# Patient Record
Sex: FEMALE | Race: WHITE | NOT HISPANIC OR LATINO | Employment: OTHER | URBAN - METROPOLITAN AREA
[De-identification: names, ages, dates, MRNs, and addresses within clinical notes are randomized per-mention and may not be internally consistent; named-entity substitution may affect disease eponyms.]

---

## 2017-04-28 ENCOUNTER — OFFICE VISIT (OUTPATIENT)
Dept: FAMILY MEDICINE | Facility: CLINIC | Age: 72
End: 2017-04-28
Payer: MEDICARE

## 2017-04-28 VITALS
HEART RATE: 56 BPM | TEMPERATURE: 97.6 F | SYSTOLIC BLOOD PRESSURE: 110 MMHG | BODY MASS INDEX: 31.03 KG/M2 | HEIGHT: 66 IN | DIASTOLIC BLOOD PRESSURE: 62 MMHG | WEIGHT: 193.1 LBS

## 2017-04-28 DIAGNOSIS — N18.30 CKD (CHRONIC KIDNEY DISEASE) STAGE 3, GFR 30-59 ML/MIN (H): ICD-10-CM

## 2017-04-28 DIAGNOSIS — Z00.00 WELL ADULT HEALTH CHECK: Primary | ICD-10-CM

## 2017-04-28 DIAGNOSIS — Z11.59 NEED FOR HEPATITIS C SCREENING TEST: ICD-10-CM

## 2017-04-28 DIAGNOSIS — E03.8 OTHER SPECIFIED HYPOTHYROIDISM: ICD-10-CM

## 2017-04-28 DIAGNOSIS — Z23 NEED FOR PROPHYLACTIC VACCINATION AGAINST STREPTOCOCCUS PNEUMONIAE (PNEUMOCOCCUS): ICD-10-CM

## 2017-04-28 DIAGNOSIS — Z13.6 CARDIOVASCULAR SCREENING; LDL GOAL LESS THAN 100: ICD-10-CM

## 2017-04-28 DIAGNOSIS — Z78.0 ASYMPTOMATIC POSTMENOPAUSAL STATUS: ICD-10-CM

## 2017-04-28 DIAGNOSIS — N39.41 URGE INCONTINENCE OF URINE: ICD-10-CM

## 2017-04-28 DIAGNOSIS — Z71.89 ADVANCED DIRECTIVES, COUNSELING/DISCUSSION: ICD-10-CM

## 2017-04-28 LAB — HGB BLD-MCNC: 13.1 G/DL (ref 11.7–15.7)

## 2017-04-28 PROCEDURE — 80053 COMPREHEN METABOLIC PANEL: CPT | Performed by: PHYSICIAN ASSISTANT

## 2017-04-28 PROCEDURE — 82043 UR ALBUMIN QUANTITATIVE: CPT | Performed by: PHYSICIAN ASSISTANT

## 2017-04-28 PROCEDURE — G0472 HEP C SCREEN HIGH RISK/OTHER: HCPCS | Performed by: PHYSICIAN ASSISTANT

## 2017-04-28 PROCEDURE — 80061 LIPID PANEL: CPT | Performed by: PHYSICIAN ASSISTANT

## 2017-04-28 PROCEDURE — G0439 PPPS, SUBSEQ VISIT: HCPCS | Performed by: PHYSICIAN ASSISTANT

## 2017-04-28 PROCEDURE — 36415 COLL VENOUS BLD VENIPUNCTURE: CPT | Performed by: PHYSICIAN ASSISTANT

## 2017-04-28 PROCEDURE — 84443 ASSAY THYROID STIM HORMONE: CPT | Performed by: PHYSICIAN ASSISTANT

## 2017-04-28 PROCEDURE — 86803 HEPATITIS C AB TEST: CPT | Performed by: PHYSICIAN ASSISTANT

## 2017-04-28 PROCEDURE — 85018 HEMOGLOBIN: CPT | Performed by: PHYSICIAN ASSISTANT

## 2017-04-28 RX ORDER — LISINOPRIL 2.5 MG/1
2.5 TABLET ORAL DAILY
Qty: 90 TABLET | Refills: 3 | Status: SHIPPED | OUTPATIENT
Start: 2017-04-28 | End: 2018-04-30

## 2017-04-28 RX ORDER — LEVOTHYROXINE SODIUM 50 UG/1
50 TABLET ORAL DAILY
Qty: 90 TABLET | Refills: 3 | Status: SHIPPED | OUTPATIENT
Start: 2017-04-28 | End: 2018-04-30

## 2017-04-28 NOTE — PROGRESS NOTES
SUBJECTIVE:                                                            Tonja Nieves is a 72 year old female who presents for Preventive Visit.      Are you in the first 12 months of your Medicare Part B coverage?  No    Healthy Habits:    Do you get at least three servings of calcium containing foods daily (dairy, green leafy vegetables, etc.)? yes    Amount of exercise or daily activities, outside of work: 1 day(s) per week    Problems taking medications regularly No    Medication side effects: dry mouth    Have you had an eye exam in the past two years? yes    Do you see a dentist twice per year? yes    Do you have sleep apnea, excessive snoring or daytime drowsiness?no    COGNITIVE SCREEN  1) Repeat 3 items (Banana, Sunrise, Chair)    2) Clock draw: NORMAL  3) 3 item recall: Recalls 3 objects  Results: 3 items recalled: COGNITIVE IMPAIRMENT LESS LIKELY    Mini-CogTM Copyright S Joie. Licensed by the author for use in [facility]; reprinted with permission (kera@[facility]). All rights reserved.                Reviewed and updated as needed this visit by clinical staff  Tobacco  Allergies  Meds  Med Hx  Surg Hx  Fam Hx  Soc Hx        Reviewed and updated as needed this visit by Provider        Social History   Substance Use Topics     Smoking status: Never Smoker     Smokeless tobacco: Never Used     Alcohol use No       does not drink    Today's PHQ-2 Score:   PHQ-2 ( 1999 Pfizer) 4/13/2015 4/10/2014   Q1: Little interest or pleasure in doing things 0 0   Q2: Feeling down, depressed or hopeless 0 0   PHQ-2 Score 0 0       Do you feel safe in your environment - Yes    Do you have a Health Care Directive?: Yes: Advance Directive has been received and scanned.    Current providers sharing in care for this patient include:   Patient Care Team:  Aaseby-Aguilera, Ramona Ann, PA-C as PCP - General (Family Practice)      Hearing impairment: No    Ability to successfully perform activities of daily  "living: Yes, no assistance needed     Fall risk:  Fallen 2 or more times in the past year?: No  Any fall with injury in the past year?: No    Home safety:  none identified  click delete button to remove this line now    The following health maintenance items are reviewed in Epic and correct as of today:  Health Maintenance   Topic Date Due     HEPATITIS C SCREENING  03/07/1963     DEXA SCAN SCREENING (SYSTEM ASSIGNED)  03/07/2012     PNEUMOCOCCAL (2 of 2 - PPSV23) 04/13/2016     BMP Q1 YR (NO INBASKET)  04/26/2017     HEMOGLOBIN Q1 YR (NO INBASKET)  04/26/2017     LIPID MONITORING Q1 YEAR( NO INBASKET)  04/26/2017     MICROALBUMIN Q1 YEAR( NO INBASKET)  04/26/2017     MEDICARE ANNUAL WELLNESS VISIT  04/26/2017     FALL RISK ASSESSMENT  04/26/2017     INFLUENZA VACCINE (SYSTEM ASSIGNED)  09/01/2017     ADVANCE DIRECTIVE PLANNING Q5 YRS (NO INBASKET)  04/08/2018     MAMMO SCREEN Q2 YR (SYSTEM ASSIGNED)  08/10/2018     TETANUS IMMUNIZATION (SYSTEM ASSIGNED)  04/08/2023     COLON CANCER SCREEN (SYSTEM ASSIGNED)  05/21/2023              ROS:  Constitutional, HEENT, cardiovascular, pulmonary, GI, , musculoskeletal, neuro, skin, endocrine and psych systems are negative, except as otherwise noted.    BP Readings from Last 3 Encounters:   04/28/17 110/62   08/22/16 118/63   04/26/16 110/60    Wt Readings from Last 3 Encounters:   04/28/17 193 lb 1.6 oz (87.6 kg)   08/22/16 192 lb 1.6 oz (87.1 kg)   04/26/16 192 lb 4.8 oz (87.2 kg)                  OBJECTIVE:                                                            /62 (BP Location: Right arm, Patient Position: Chair, Cuff Size: Adult Large)  Pulse 56  Temp 97.6  F (36.4  C) (Oral)  Ht 5' 6\" (1.676 m)  Wt 193 lb 1.6 oz (87.6 kg)  BMI 31.17 kg/m2 Estimated body mass index is 31.17 kg/(m^2) as calculated from the following:    Height as of this encounter: 5' 6\" (1.676 m).    Weight as of this encounter: 193 lb 1.6 oz (87.6 kg).  EXAM:   GENERAL: healthy, alert " and no distress  EYES: Eyes grossly normal to inspection, PERRL and conjunctivae and sclerae normal  HENT: ear canals and TM's normal, nose and mouth without ulcers or lesions  NECK: no adenopathy, no asymmetry, masses, or scars and thyroid normal to palpation  RESP: lungs clear to auscultation - no rales, rhonchi or wheezes  BREAST: normal without masses, tenderness or nipple discharge and no palpable axillary masses or adenopathy  CV: regular rate and rhythm, normal S1 S2, no S3 or S4, no murmur, click or rub, no peripheral edema and peripheral pulses strong  ABDOMEN: soft, nontender, no hepatosplenomegaly, no masses and bowel sounds normal  MS: no gross musculoskeletal defects noted, no edema  SKIN: no suspicious lesions or rashes  NEURO: Normal strength and tone, mentation intact and speech normal  PSYCH: mentation appears normal, affect normal/bright  LYMPH: no cervical, supraclavicular, axillary, or inguinal adenopathy    ASSESSMENT / PLAN:                                                            1. Asymptomatic postmenopausal status    - DEXA HIP/PELVIS/SPINE - Future; Future    2. Need for hepatitis C screening test    - Hepatitis C Screen Reflex to HCV RNA Quant and Genotype    3. Need for prophylactic vaccination against Streptococcus pneumoniae (pneumococcus)      4. Advanced directives, counseling/discussion      5. CKD (chronic kidney disease) stage 3, GFR 30-59 ml/min    - Hemoglobin  - Lipid panel reflex to direct LDL  - Albumin Random Urine Quantitative  - TSH with free T4 reflex  - Comprehensive metabolic panel    6. CARDIOVASCULAR SCREENING; LDL GOAL LESS THAN 100    - Lipid panel reflex to direct LDL  - Comprehensive metabolic panel    7. Other specified hypothyroidism    - TSH with free T4 reflex    8. Urge incontinence of urine        End of Life Planning:  Patient currently has an advanced directive: Yes.  Practitioner is supportive of decision.    COUNSELING:  Reviewed preventive health  "counseling, as reflected in patient instructions       Regular exercise       Healthy diet/nutrition       Vision screening       Hearing screening        Estimated body mass index is 31.17 kg/(m^2) as calculated from the following:    Height as of this encounter: 5' 6\" (1.676 m).    Weight as of this encounter: 193 lb 1.6 oz (87.6 kg).     reports that she has never smoked. She has never used smokeless tobacco.      Appropriate preventive services were discussed with this patient, including applicable screening as appropriate for cardiovascular disease, diabetes, osteopenia/osteoporosis, and glaucoma.  As appropriate for age/gender, discussed screening for colorectal cancer, prostate cancer, breast cancer, and cervical cancer. Checklist reviewing preventive services available has been given to the patient.    Reviewed patients plan of care and provided an AVS. The Basic Care Plan (routine screening as documented in Health Maintenance) for Tonja meets the Care Plan requirement. This Care Plan has been established and reviewed with the Patient.    Counseling Resources:  ATP IV Guidelines  Pooled Cohorts Equation Calculator  Breast Cancer Risk Calculator  FRAX Risk Assessment  ICSI Preventive Guidelines  Dietary Guidelines for Americans, 2010  USDA's MyPlate  ASA Prophylaxis  Lung CA Screening    Ramona Ann Aaseby-Aguilera, PA-C  [facility]  "

## 2017-04-28 NOTE — PATIENT INSTRUCTIONS
Please take :    Multivitamin    Probiotic: bifido or acidophilus  Omega 3: 1 g/day fish oil supplement (that would contain between 200 to 800 mg of EPA+DHA, depending on the formulation) or one to two servings per week of oily fish [22].    Vitamin D3  5,000 units daily throughout the winter    Magnesium glycinate  200 - 300 mg     Flare3d:    Products and podcasts                     Preventive Health Recommendations    Female Ages 65 +    Yearly exam:     See your health care provider every year in order to  o Review health changes.   o Discuss preventive care.    o Review your medicines if your doctor has prescribed any.      You no longer need a yearly Pap test unless you've had an abnormal Pap test in the past 10 years. If you have vaginal symptoms, such as bleeding or discharge, be sure to talk with your provider about a Pap test.      Every 1 to 2 years, have a mammogram.  If you are over 69, talk with your health care provider about whether or not you want to continue having screening mammograms.      Every 10 years, have a colonoscopy. Or, have a yearly FIT test (stool test). These exams will check for colon cancer.       Have a cholesterol test every 5 years, or more often if your doctor advises it.       Have a diabetes test (fasting glucose) every three years. If you are at risk for diabetes, you should have this test more often.       At age 65, have a bone density scan (DEXA) to check for osteoporosis (brittle bone disease).    Shots:    Get a flu shot each year.    Get a tetanus shot every 10 years.    Talk to your doctor about your pneumonia vaccines. There are now two you should receive - Pneumovax (PPSV 23) and Prevnar (PCV 13).    Talk to your doctor about the shingles vaccine.    Talk to your doctor about the hepatitis B vaccine.    Nutrition:     Eat at least 5 servings of fruits and vegetables each day.      Eat whole-grain bread, whole-wheat pasta and brown rice instead of white  grains and rice.      Talk to your provider about Calcium and Vitamin D.     Lifestyle    Exercise at least 150 minutes a week (30 minutes a day, 5 days a week). This will help you control your weight and prevent disease.      Limit alcohol to one drink per day.      No smoking.       Wear sunscreen to prevent skin cancer.       See your dentist twice a year for an exam and cleaning.      See your eye doctor every 1 to 2 years to screen for conditions such as glaucoma, macular degeneration and cataracts.

## 2017-04-28 NOTE — ASSESSMENT & PLAN NOTE
Advance Care Planning 4/28/2017: ACP Review of Chart / Resources Provided:  Reviewed chart for advance care plan.  Tonja Nieves has an advance care plan which needs to be updated. Patient states presence of new/updated ACP document. Copy requested  Added by Julissa Stauffer

## 2017-04-28 NOTE — MR AVS SNAPSHOT
After Visit Summary   4/28/2017    Tonja Nieves    MRN: [id number]           Patient Information     Date Of Birth          1945        Visit Information        Provider Department      4/28/2017 9:15 AM Aaseby-Aguilera, Ramona Ann, PA-C [facility]        Today's Diagnoses     CKD (chronic kidney disease) stage 3, GFR 30-59 ml/min    -  1    Asymptomatic postmenopausal status        Need for hepatitis C screening test        Need for prophylactic vaccination against Streptococcus pneumoniae (pneumococcus)        Advanced directives, counseling/discussion        CARDIOVASCULAR SCREENING; LDL GOAL LESS THAN 100        Other specified hypothyroidism        Urge incontinence of urine          Care Instructions    Please take :    Multivitamin    Probiotic: bifido or acidophilus  Omega 3: 1 g/day fish oil supplement (that would contain between 200 to 800 mg of EPA+DHA, depending on the formulation) or one to two servings per week of oily fish [22].    Vitamin D3  5,000 units daily throughout the winter    Magnesium glycinate  200 - 300 mg     Digigraph.me:    Products and podcasts                     Preventive Health Recommendations    Female Ages 65 +    Yearly exam:     See your health care provider every year in order to  o Review health changes.   o Discuss preventive care.    o Review your medicines if your doctor has prescribed any.      You no longer need a yearly Pap test unless you've had an abnormal Pap test in the past 10 years. If you have vaginal symptoms, such as bleeding or discharge, be sure to talk with your provider about a Pap test.      Every 1 to 2 years, have a mammogram.  If you are over 69, talk with your health care provider about whether or not you want to continue having screening mammograms.      Every 10 years, have a colonoscopy. Or, have a yearly FIT test (stool test). These exams will check for colon cancer.       Have a cholesterol test every  5 years, or more often if your doctor advises it.       Have a diabetes test (fasting glucose) every three years. If you are at risk for diabetes, you should have this test more often.       At age 65, have a bone density scan (DEXA) to check for osteoporosis (brittle bone disease).    Shots:    Get a flu shot each year.    Get a tetanus shot every 10 years.    Talk to your doctor about your pneumonia vaccines. There are now two you should receive - Pneumovax (PPSV 23) and Prevnar (PCV 13).    Talk to your doctor about the shingles vaccine.    Talk to your doctor about the hepatitis B vaccine.    Nutrition:     Eat at least 5 servings of fruits and vegetables each day.      Eat whole-grain bread, whole-wheat pasta and brown rice instead of white grains and rice.      Talk to your provider about Calcium and Vitamin D.     Lifestyle    Exercise at least 150 minutes a week (30 minutes a day, 5 days a week). This will help you control your weight and prevent disease.      Limit alcohol to one drink per day.      No smoking.       Wear sunscreen to prevent skin cancer.       See your dentist twice a year for an exam and cleaning.      See your eye doctor every 1 to 2 years to screen for conditions such as glaucoma, macular degeneration and cataracts.        Follow-ups after your visit        Future tests that were ordered for you today     Open Future Orders        Priority Expected Expires Ordered    DEXA HIP/PELVIS/SPINE - Future Routine  4/28/2018 4/28/2017            Who to contact     If you have questions or need follow up information about today's clinic visit or your schedule please contact [facility] directly at [phone].  Normal or non-critical lab and imaging results will be communicated to you by MyChart, letter or phone within 4 business days after the clinic has received the results. If you do not hear from us within 7 days, please contact the clinic through MyChart or phone. If you have  "a critical or abnormal lab result, we will notify you by phone as soon as possible.  Submit refill requests through [employer] or call your pharmacy and they will forward the refill request to us. Please allow 3 business days for your refill to be completed.          Additional Information About Your Visit        Tagbrandhart Information     [employer] gives you secure access to your electronic health record. If you see a primary care provider, you can also send messages to your care team and make appointments. If you have questions, please call your primary care clinic.  If you do not have a primary care provider, please call [phone] and they will assist you.        Care EveryWhere ID     This is your Care EveryWhere ID. This could be used by other organizations to access your [facility] medical records  [id number]        Your Vitals Were     Pulse Temperature Height BMI (Body Mass Index)          56 97.6  F (36.4  C) (Oral) 5' 6\" (1.676 m) 31.17 kg/m2         Blood Pressure from Last 3 Encounters:   04/28/17 110/62   08/22/16 118/63   04/26/16 110/60    Weight from Last 3 Encounters:   04/28/17 193 lb 1.6 oz (87.6 kg)   08/22/16 192 lb 1.6 oz (87.1 kg)   04/26/16 192 lb 4.8 oz (87.2 kg)              We Performed the Following     Albumin Random Urine Quantitative     Comprehensive metabolic panel     Hemoglobin     Hepatitis C Screen Reflex to HCV RNA Quant and Genotype     Lipid panel reflex to direct LDL     TSH with free T4 reflex          Where to get your medicines      These medications were sent to [facility] [address] [place] [id number]     Phone:  [phone]     levothyroxine 50 MCG tablet    lisinopril 2.5 MG tablet    oxybutynin 3.9 MG/24HR BIW patch          Primary Care Provider Office Phone # Fax #    Ramona Ann Aaseby-Aguilera, PA-C [phone]       [facility] [address] [facility] [address]      "   Thank you!     Thank you for choosing [facility]  for your care. Our goal is always to provide you with excellent care. Hearing back from our patients is one way we can continue to improve our services. Please take a few minutes to complete the written survey that you may receive in the mail after your visit with us. Thank you!             Your Updated Medication List - Protect others around you: Learn how to safely use, store and throw away your medicines at www.disposemymeds.org.          This list is accurate as of: 4/28/17  9:57 AM.  Always use your most recent med list.                   Brand Name Dispense Instructions for use    CALCIUM CITRATE + D PO      Reported on 4/28/2017       Cranberry 500 MG Caps      Reported on 4/28/2017       FEMIRON PO      Reported on 4/28/2017       GLUCOSAMINE CHONDR COMPLEX PO      Reported on 4/28/2017       L-Lysine 500 MG Tabs      Take  by mouth. Take one daily       levothyroxine 50 MCG tablet    SYNTHROID/LEVOTHROID    90 tablet    Take 1 tablet (50 mcg) by mouth daily Take one by mouth daily       lisinopril 2.5 MG tablet    PRINIVIL/Zestril    90 tablet    Take 1 tablet (2.5 mg) by mouth daily       MULTI-VITAMIN PO      Reported on 4/28/2017       oxybutynin 3.9 MG/24HR BIW patch    OXYTROL    24 patch    Place 1 patch onto the skin twice a week.       oxybutynin 3.9 MG/24HR BIW patch   Start taking on:  5/1/2017    OXYTROL    8 patch    Place 1 patch onto the skin twice a week       UNABLE TO FIND      MEDICATION NAME: b sublingual total - b12 supplement

## 2017-04-29 LAB
ALBUMIN SERPL-MCNC: 3.4 G/DL (ref 3.4–5)
ALP SERPL-CCNC: 94 U/L (ref 40–150)
ALT SERPL W P-5'-P-CCNC: 26 U/L (ref 0–50)
ANION GAP SERPL CALCULATED.3IONS-SCNC: 12 MMOL/L (ref 3–14)
AST SERPL W P-5'-P-CCNC: 16 U/L (ref 0–45)
BILIRUB SERPL-MCNC: 0.4 MG/DL (ref 0.2–1.3)
BUN SERPL-MCNC: 30 MG/DL (ref 7–30)
CALCIUM SERPL-MCNC: 8.7 MG/DL (ref 8.5–10.1)
CHLORIDE SERPL-SCNC: 110 MMOL/L (ref 94–109)
CHOLEST SERPL-MCNC: 217 MG/DL
CO2 SERPL-SCNC: 21 MMOL/L (ref 20–32)
CREAT SERPL-MCNC: 1.34 MG/DL (ref 0.52–1.04)
CREAT UR-MCNC: 120 MG/DL
GFR SERPL CREATININE-BSD FRML MDRD: 39 ML/MIN/1.7M2
GLUCOSE SERPL-MCNC: 101 MG/DL (ref 70–99)
HDLC SERPL-MCNC: 76 MG/DL
LDLC SERPL CALC-MCNC: 117 MG/DL
MICROALBUMIN UR-MCNC: 40 MG/L
MICROALBUMIN/CREAT UR: 33.08 MG/G CR (ref 0–25)
NONHDLC SERPL-MCNC: 141 MG/DL
POTASSIUM SERPL-SCNC: 5.3 MMOL/L (ref 3.4–5.3)
PROT SERPL-MCNC: 7.1 G/DL (ref 6.8–8.8)
SODIUM SERPL-SCNC: 143 MMOL/L (ref 133–144)
TRIGL SERPL-MCNC: 118 MG/DL
TSH SERPL DL<=0.005 MIU/L-ACNC: 2.2 MU/L (ref 0.4–4)

## 2017-04-30 LAB — HCV AB SERPL QL IA: NORMAL

## 2017-08-05 ENCOUNTER — TELEPHONE (OUTPATIENT)
Dept: FAMILY MEDICINE | Facility: CLINIC | Age: 72
End: 2017-08-05

## 2017-08-21 ENCOUNTER — RADIANT APPOINTMENT (OUTPATIENT)
Dept: MAMMOGRAPHY | Facility: CLINIC | Age: 72
End: 2017-08-21
Payer: MEDICARE

## 2017-08-21 DIAGNOSIS — Z12.31 VISIT FOR SCREENING MAMMOGRAM: ICD-10-CM

## 2017-08-21 PROCEDURE — G0202 SCR MAMMO BI INCL CAD: HCPCS | Mod: TC

## 2018-01-31 ENCOUNTER — OFFICE VISIT (OUTPATIENT)
Dept: FAMILY MEDICINE | Facility: CLINIC | Age: 73
End: 2018-01-31
Payer: MEDICARE

## 2018-01-31 VITALS
TEMPERATURE: 97.4 F | RESPIRATION RATE: 16 BRPM | WEIGHT: 196 LBS | DIASTOLIC BLOOD PRESSURE: 66 MMHG | BODY MASS INDEX: 31.5 KG/M2 | SYSTOLIC BLOOD PRESSURE: 112 MMHG | OXYGEN SATURATION: 96 % | HEIGHT: 66 IN

## 2018-01-31 DIAGNOSIS — H69.92 DYSFUNCTION OF LEFT EUSTACHIAN TUBE: Primary | ICD-10-CM

## 2018-01-31 PROCEDURE — 99213 OFFICE O/P EST LOW 20 MIN: CPT | Performed by: NURSE PRACTITIONER

## 2018-01-31 RX ORDER — AMOXICILLIN 500 MG/1
500 CAPSULE ORAL 3 TIMES DAILY
Qty: 30 CAPSULE | Refills: 0 | Status: SHIPPED | OUTPATIENT
Start: 2018-01-31 | End: 2018-04-30

## 2018-01-31 NOTE — NURSING NOTE
"Pt will wait on the Pnurmo 23 and dexa as of 1/31/2018.Randy Melgoza CMA      Chief Complaint   Patient presents with     Ear Problem       Initial /66 (BP Location: Right arm, Patient Position: Chair, Cuff Size: Adult Regular)  Temp 97.4  F (36.3  C) (Oral)  Resp 16  Ht 5' 6\" (1.676 m)  Wt 196 lb (88.9 kg)  SpO2 96%  BMI 31.64 kg/m2 Estimated body mass index is 31.64 kg/(m^2) as calculated from the following:    Height as of this encounter: 5' 6\" (1.676 m).    Weight as of this encounter: 196 lb (88.9 kg).  Medication Reconciliation: complete     Randy Melgoza CMA      "

## 2018-01-31 NOTE — PROGRESS NOTES
SUBJECTIVE:   Tonja Nieves is a 72 year old female who presents to clinic today for the following health issues:      Acute Illness, had dental work yesterday   Acute illness concerns: ear pain, left  Onset: yesterday    Fever: no     Chills/Sweats: no     Headache (location?): no     Sinus Pressure:no    Conjunctivitis:  no    Ear Pain: YES: left    Rhinorrhea: YES- slight    Congestion: no     Sore Throat: no      Cough: no    Wheeze: no     Decreased Appetite: no     Nausea: no     Vomiting: no     Diarrhea:  no     Dysuria/Freq.: no     Fatigue/Achiness: no     Sick/Strep Exposure: no      Therapies Tried and outcome: took some Ibuprofen last night, with some help  Here with complaints of left-sided ear pain for the past day. Took some ibuprofen last night and has noticed some relief in symptoms. No recent cold or flu. Pain is very severe and woke her up. No history of recurrent ear infections.        Problem list and histories reviewed & adjusted, as indicated.  Additional history: none    Patient Active Problem List   Diagnosis     Advanced directives, counseling/discussion     Urge incontinence of urine     CKD (chronic kidney disease) stage 3, GFR 30-59 ml/min     CARDIOVASCULAR SCREENING; LDL GOAL LESS THAN 100     Other specified hypothyroidism     Past Surgical History:   Procedure Laterality Date     bilateral hip replacement  2006     COLONOSCOPY       GASTRIC BYPASS  2005    [id number]     TONSILLECTOMY      age 9       Social History   Substance Use Topics     Smoking status: Never Smoker     Smokeless tobacco: Never Used     Alcohol use No     Family History   Problem Relation Age of Onset     Alzheimer Disease Mother      Hypertension Mother      DIABETES Father      HEART DISEASE Father      Genetic Disorder Brother 55     ms     Thyroid Disease Sister 60     thyroid      Thyroid Disease Daughter            Reviewed and updated as needed this visit by clinical staff  Tobacco  Allergies  Meds  " Problems  Med Hx  Surg Hx  Fam Hx  Soc Hx        Reviewed and updated as needed this visit by Provider  Allergies  Meds  Problems  Med Hx  Surg Hx  Fam Hx         ROS:  Constitutional, HEENT, cardiovascular, pulmonary, gi and gu systems are negative, except as otherwise noted.    OBJECTIVE:     /66 (BP Location: Right arm, Patient Position: Chair, Cuff Size: Adult Regular)  Temp 97.4  F (36.3  C) (Oral)  Resp 16  Ht 5' 6\" (1.676 m)  Wt 196 lb (88.9 kg)  SpO2 96%  BMI 31.64 kg/m2  Body mass index is 31.64 kg/(m^2).  GENERAL: healthy, alert and no distress  EYES: Eyes grossly normal to inspection, PERRL and conjunctivae and sclerae normal  HENT: normal cephalic/atraumatic, left ear: bulging membrane, nose and mouth without ulcers or lesions, oropharynx clear and oral mucous membranes moist  NECK: no adenopathy, no asymmetry, masses, or scars and thyroid normal to palpation  RESP: lungs clear to auscultation - no rales, rhonchi or wheezes  CV: regular rate and rhythm, normal S1 S2, no S3 or S4, no murmur, click or rub, no peripheral edema and peripheral pulses strong  PSYCH: mentation appears normal, affect normal/bright        ASSESSMENT/PLAN:             1. Dysfunction of left eustachian tube   Encourage patient to continue with ibuprofen every 8 hours for the next 1-2 days. If symptoms do not improve with Tylenol and Sudafed may start amoxicillin.  - amoxicillin (AMOXIL) 500 MG capsule; Take 1 capsule (500 mg) by mouth 3 times daily  Dispense: 30 capsule; Refill: 0    Follow-up as needed.    Mariela Gold, NP  [facility]    "

## 2018-01-31 NOTE — MR AVS SNAPSHOT
After Visit Summary   1/31/2018    Tonja Nieves    MRN: [id number]           Patient Information     Date Of Birth          1945        Visit Information        Provider Department      1/31/2018 10:45 AM Mariela Gold NP [facility]        Today's Diagnoses     Dysfunction of left eustachian tube    -  1       Follow-ups after your visit        Your next 10 appointments already scheduled     Apr 30, 2018 10:00 AM CDT   PHYSICAL with Ramona Ann Aaseby-Aguilera, PA-C   [facility]    [address] [facility] [address] [phone]              Who to contact     If you have questions or need follow up information about today's clinic visit or your schedule please contact [facility] directly at [phone].  Normal or non-critical lab and imaging results will be communicated to you by MyChart, letter or phone within 4 business days after the clinic has received the results. If you do not hear from us within 7 days, please contact the clinic through MyChart or phone. If you have a critical or abnormal lab result, we will notify you by phone as soon as possible.  Submit refill requests through LifeBook or call your pharmacy and they will forward the refill request to us. Please allow 3 business days for your refill to be completed.          Additional Information About Your Visit        MyChart Information     LifeBook gives you secure access to your electronic health record. If you see a primary care provider, you can also send messages to your care team and make appointments. If you have questions, please call your primary care clinic.  If you do not have a primary care provider, please call [phone] and they will assist you.        Care EveryWhere ID     This is your Care EveryWhere ID. This could be used by other organizations to access your [facility] medical records  [id number]        Your Vitals Were   "   Temperature Respirations Height Pulse Oximetry BMI (Body Mass Index)       97.4  F (36.3  C) (Oral) 16 5' 6\" (1.676 m) 96% 31.64 kg/m2        Blood Pressure from Last 3 Encounters:   01/31/18 112/66   04/28/17 110/62   08/22/16 118/63    Weight from Last 3 Encounters:   01/31/18 196 lb (88.9 kg)   04/28/17 193 lb 1.6 oz (87.6 kg)   08/22/16 192 lb 1.6 oz (87.1 kg)              Today, you had the following     No orders found for display         Today's Medication Changes          These changes are accurate as of 1/31/18 11:04 AM.  If you have any questions, ask your nurse or doctor.               Start taking these medicines.        Dose/Directions    amoxicillin 500 MG capsule   Commonly known as:  AMOXIL   Used for:  Dysfunction of left eustachian tube   Started by:  Mariela Gold NP        Dose:  500 mg   Take 1 capsule (500 mg) by mouth 3 times daily   Quantity:  30 capsule   Refills:  0            Where to get your medicines      These medications were sent to [facility] [address] [place] [id number]     Phone:  [phone]     amoxicillin 500 MG capsule                Primary Care Provider Office Phone # Fax #    Ramona Ann Aaseby-Aguilera, PA-C [phone] [address] [facility] [address]        Equal Access to Services     ARIELA WOOD AH: Hadii aad ku hadasho Soomaali, waaxda luqadaha, qaybta kaalmada adeegyada, waxay calli hilliard. So [facility] [phone].    ATENCIÓN: Si habla shelbi, tiene a treviño disposición servicios gratuitos de asistencia lingüística. Llame al [phone].    We comply with applicable federal civil rights laws and [place] laws. We do not discriminate on the basis of race, color, national origin, age, disability, sex, sexual orientation, or gender identity.            Thank you!     Thank you for choosing [facility]  for your care. Our goal is always to " provide you with excellent care. Hearing back from our patients is one way we can continue to improve our services. Please take a few minutes to complete the written survey that you may receive in the mail after your visit with us. Thank you!             Your Updated Medication List - Protect others around you: Learn how to safely use, store and throw away your medicines at www.disposemymeds.org.          This list is accurate as of 1/31/18 11:04 AM.  Always use your most recent med list.                   Brand Name Dispense Instructions for use Diagnosis    amoxicillin 500 MG capsule    AMOXIL    30 capsule    Take 1 capsule (500 mg) by mouth 3 times daily    Dysfunction of left eustachian tube       CALCIUM CITRATE + D PO      Reported on 4/28/2017        Cranberry 500 MG Caps      Reported on 4/28/2017        GLUCOSAMINE CHONDR COMPLEX PO      Reported on 4/28/2017        L-Lysine 500 MG Tabs      Take  by mouth. Take one daily        levothyroxine 50 MCG tablet    SYNTHROID/LEVOTHROID    90 tablet    Take 1 tablet (50 mcg) by mouth daily Take one by mouth daily    Other specified hypothyroidism       lisinopril 2.5 MG tablet    PRINIVIL/Zestril    90 tablet    Take 1 tablet (2.5 mg) by mouth daily    CKD (chronic kidney disease) stage 3, GFR 30-59 ml/min       MULTI-VITAMIN PO      Reported on 4/28/2017        oxybutynin 3.9 MG/24HR BIW patch    OXYTROL    8 patch    Place 1 patch onto the skin twice a week    Urge incontinence of urine

## 2018-02-13 ENCOUNTER — TELEPHONE (OUTPATIENT)
Dept: FAMILY MEDICINE | Facility: CLINIC | Age: 73
End: 2018-02-13

## 2018-02-13 NOTE — TELEPHONE ENCOUNTER
"Pt calling stating she finished her antibiotic for an ear infection and she is getting better but every now and then having a \"twinge of pain.\"  Advised to try a warm or cool compress, depending on what feels better for her, over the next couple days and use tylenol for discomfort/inflammation.  If not getting better or her symptoms get worse over the next couple days she should follow up in clinic to have the ear looked at.    Pt expressed understanding and acceptance of the plan.  Pt had no further questions at this time.  Advised can call back to clinic at any time with concerns.       Sheila David RN, BSN    "

## 2018-04-16 ENCOUNTER — TRANSFERRED RECORDS (OUTPATIENT)
Dept: HEALTH INFORMATION MANAGEMENT | Facility: CLINIC | Age: 73
End: 2018-04-16

## 2018-04-29 ASSESSMENT — ACTIVITIES OF DAILY LIVING (ADL)
CURRENT_FUNCTION: NO ASSISTANCE NEEDED
I_NEED_ASSISTANCE_FOR_THE_FOLLOWING_DAILY_ACTIVITIES:: NO ASSISTANCE IS NEEDED

## 2018-04-30 ENCOUNTER — OFFICE VISIT (OUTPATIENT)
Dept: FAMILY MEDICINE | Facility: CLINIC | Age: 73
End: 2018-04-30
Payer: MEDICARE

## 2018-04-30 VITALS
RESPIRATION RATE: 16 BRPM | OXYGEN SATURATION: 98 % | DIASTOLIC BLOOD PRESSURE: 70 MMHG | SYSTOLIC BLOOD PRESSURE: 110 MMHG | TEMPERATURE: 98.2 F | HEART RATE: 52 BPM | BODY MASS INDEX: 31.57 KG/M2 | HEIGHT: 66 IN | WEIGHT: 196.4 LBS

## 2018-04-30 DIAGNOSIS — Z00.00 ROUTINE GENERAL MEDICAL EXAMINATION AT A HEALTH CARE FACILITY: ICD-10-CM

## 2018-04-30 DIAGNOSIS — N18.30 CKD (CHRONIC KIDNEY DISEASE) STAGE 3, GFR 30-59 ML/MIN (H): Primary | ICD-10-CM

## 2018-04-30 DIAGNOSIS — E03.8 OTHER SPECIFIED HYPOTHYROIDISM: ICD-10-CM

## 2018-04-30 DIAGNOSIS — Z13.6 CARDIOVASCULAR SCREENING; LDL GOAL LESS THAN 100: ICD-10-CM

## 2018-04-30 DIAGNOSIS — Z78.0 ASYMPTOMATIC POSTMENOPAUSAL STATUS: ICD-10-CM

## 2018-04-30 DIAGNOSIS — G47.00 PERSISTENT INSOMNIA: ICD-10-CM

## 2018-04-30 LAB
ERYTHROCYTE [DISTWIDTH] IN BLOOD BY AUTOMATED COUNT: 14.7 % (ref 10–15)
HCT VFR BLD AUTO: 41.5 % (ref 35–47)
HGB BLD-MCNC: 13.3 G/DL (ref 11.7–15.7)
MCH RBC QN AUTO: 29.4 PG (ref 26.5–33)
MCHC RBC AUTO-ENTMCNC: 32 G/DL (ref 31.5–36.5)
MCV RBC AUTO: 92 FL (ref 78–100)
PLATELET # BLD AUTO: 229 10E9/L (ref 150–450)
RBC # BLD AUTO: 4.52 10E12/L (ref 3.8–5.2)
WBC # BLD AUTO: 4.5 10E9/L (ref 4–11)

## 2018-04-30 PROCEDURE — 85027 COMPLETE CBC AUTOMATED: CPT | Performed by: PHYSICIAN ASSISTANT

## 2018-04-30 PROCEDURE — 36415 COLL VENOUS BLD VENIPUNCTURE: CPT | Performed by: PHYSICIAN ASSISTANT

## 2018-04-30 PROCEDURE — 80053 COMPREHEN METABOLIC PANEL: CPT | Performed by: PHYSICIAN ASSISTANT

## 2018-04-30 PROCEDURE — G0439 PPPS, SUBSEQ VISIT: HCPCS | Performed by: PHYSICIAN ASSISTANT

## 2018-04-30 PROCEDURE — 82043 UR ALBUMIN QUANTITATIVE: CPT | Performed by: PHYSICIAN ASSISTANT

## 2018-04-30 PROCEDURE — 80061 LIPID PANEL: CPT | Performed by: PHYSICIAN ASSISTANT

## 2018-04-30 PROCEDURE — 84443 ASSAY THYROID STIM HORMONE: CPT | Performed by: PHYSICIAN ASSISTANT

## 2018-04-30 RX ORDER — TRAZODONE HYDROCHLORIDE 50 MG/1
50-100 TABLET, FILM COATED ORAL
Qty: 60 TABLET | Refills: 11 | Status: SHIPPED | OUTPATIENT
Start: 2018-04-30 | End: 2018-10-30

## 2018-04-30 RX ORDER — LISINOPRIL 2.5 MG/1
2.5 TABLET ORAL DAILY
Qty: 90 TABLET | Refills: 3 | Status: SHIPPED | OUTPATIENT
Start: 2018-04-30 | End: 2019-05-02

## 2018-04-30 RX ORDER — LEVOTHYROXINE SODIUM 50 UG/1
50 TABLET ORAL DAILY
Qty: 90 TABLET | Refills: 3 | Status: SHIPPED | OUTPATIENT
Start: 2018-04-30 | End: 2019-05-02

## 2018-04-30 ASSESSMENT — ACTIVITIES OF DAILY LIVING (ADL): CURRENT_FUNCTION: NO ASSISTANCE NEEDED

## 2018-04-30 NOTE — PROGRESS NOTES
SUBJECTIVE:   Tonja Nieves is a 73 year old female who presents for Preventive Visit.      Are you in the first 12 months of your Medicare coverage?  No    Physical   Annual:     Getting at least 3 servings of Calcium per day::  Yes    Bi-annual eye exam::  Yes    Dental care twice a year::  Yes    Sleep apnea or symptoms of sleep apnea::  None    Diet::  Regular (no restrictions)    Frequency of exercise::  None    Taking medications regularly::  Yes    Medication side effects::  None    Additional concerns today::  No    Ability to successfully perform activities of daily living: no assistance needed  Home Safety:  No safety concerns identified  Hearing Impairment: no hearing concerns        Fall risk:       COGNITIVE SCREEN  1) Repeat 3 items (Banana, Sunrise, Chair)    2) Clock draw: NORMAL  3) 3 item recall: Recalls 3 objects  Results: 3 items recalled: COGNITIVE IMPAIRMENT LESS LIKELY    Mini-CogTM Copyright S Joie. Licensed by the author for use in [facility]; reprinted with permission (kera@[facility]). All rights reserved.        Reviewed and updated as needed this visit by clinical staff  Allergies  Meds         Reviewed and updated as needed this visit by Provider        Social History   Substance Use Topics     Smoking status: Never Smoker     Smokeless tobacco: Never Used     Alcohol use No       Alcohol Use 4/29/2018   If you drink alcohol do you typically have greater than 3 drinks per day OR greater than 7 drinks per week? Not Applicable           Knee study. Discuss patch for over active bladder doesn't seem to always work.    Today's PHQ-2 Score:   PHQ-2 ( 1999 Pfizer) 4/29/2018   Q1: Little interest or pleasure in doing things 0   Q2: Feeling down, depressed or hopeless 0   PHQ-2 Score 0   Q1: Little interest or pleasure in doing things Not at all   Q2: Feeling down, depressed or hopeless Not at all   PHQ-2 Score 0       Do you feel safe in your environment - Yes    Do you have a  Health Care Directive?: Yes: Patient states has Advance Directive and will bring in a copy to clinic.    Current providers sharing in care for this patient include:   Patient Care Team:  Aaseby-Aguilera, Ramona Ann, PA-C as PCP - General (Family Practice)    The following health maintenance items are reviewed in Epic and correct as of today:  Health Maintenance   Topic Date Due     DEXA SCAN SCREENING (SYSTEM ASSIGNED)  03/07/2012     MEDICARE ANNUAL WELLNESS VISIT  04/26/2017     BMP Q1 YR  04/28/2018     HEMOGLOBIN Q1 YR  04/28/2018     LIPID MONITORING Q1 YEAR  04/28/2018     MICROALBUMIN Q1 YEAR  04/28/2018     FALL RISK ASSESSMENT  04/28/2018     MAMMO SCREEN Q2 YR (SYSTEM ASSIGNED)  08/21/2019     ADVANCE DIRECTIVE PLANNING Q5 YRS  04/28/2022     TETANUS IMMUNIZATION (SYSTEM ASSIGNED)  04/08/2023     COLON CANCER SCREEN (SYSTEM ASSIGNED)  05/21/2023     PNEUMOCOCCAL  Addressed     INFLUENZA VACCINE  Completed     HEPATITIS C SCREENING  Completed     BP Readings from Last 3 Encounters:   04/30/18 110/70   01/31/18 112/66   04/28/17 110/62    Wt Readings from Last 3 Encounters:   04/30/18 196 lb 6.4 oz (89.1 kg)   01/31/18 196 lb (88.9 kg)   04/28/17 193 lb 1.6 oz (87.6 kg)                  Current Outpatient Prescriptions   Medication Sig Dispense Refill     levothyroxine (SYNTHROID/LEVOTHROID) 50 MCG tablet Take 1 tablet (50 mcg) by mouth daily Take one by mouth daily 90 tablet 3     lisinopril (PRINIVIL/ZESTRIL) 2.5 MG tablet Take 1 tablet (2.5 mg) by mouth daily 90 tablet 3     oxybutynin (OXYTROL) 3.9 MG/24HR BIW patch Place 1 patch onto the skin twice a week 8 patch 11     Calcium Citrate-Vitamin D (CALCIUM CITRATE + D PO) Reported on 4/28/2017       Cranberry 500 MG CAPS Reported on 4/28/2017       Glucosamine-Chondroitin (GLUCOSAMINE CHONDR COMPLEX PO) Reported on 4/28/2017       L-Lysine 500 MG TABS Take  by mouth. Take one daily       Multiple Vitamin (MULTI-VITAMIN PO) Reported on 4/28/2017        "[DISCONTINUED] levothyroxine (SYNTHROID/LEVOTHROID) 50 MCG tablet Take 1 tablet (50 mcg) by mouth daily Take one by mouth daily 90 tablet 3     [DISCONTINUED] lisinopril (PRINIVIL/ZESTRIL) 2.5 MG tablet Take 1 tablet (2.5 mg) by mouth daily 90 tablet 3           Review of Systems  Constitutional, HEENT, cardiovascular, pulmonary, gi and gu systems are negative, except as otherwise noted.    OBJECTIVE:   There were no vitals taken for this visit. Estimated body mass index is 31.64 kg/(m^2) as calculated from the following:    Height as of 1/31/18: 5' 6\" (1.676 m).    Weight as of 1/31/18: 196 lb (88.9 kg).  Physical Exam  GENERAL APPEARANCE: healthy, alert and no distress  EYES: Eyes grossly normal to inspection, PERRL and conjunctivae and sclerae normal  HENT: ear canals and TM's normal, nose and mouth without ulcers or lesions, oropharynx clear and oral mucous membranes moist  NECK: no adenopathy, no asymmetry, masses, or scars and thyroid normal to palpation  RESP: lungs clear to auscultation - no rales, rhonchi or wheezes  BREAST: normal without masses, tenderness or nipple discharge and no palpable axillary masses or adenopathy  CV: regular rate and rhythm, normal S1 S2, no S3 or S4, no murmur, click or rub, no peripheral edema and peripheral pulses strong  ABDOMEN: soft, nontender, no hepatosplenomegaly, no masses and bowel sounds normal  MS: no musculoskeletal defects are noted and gait is age appropriate without ataxia  SKIN: no suspicious lesions or rashes  NEURO: Normal strength and tone, sensory exam grossly normal, mentation intact and speech normal  PSYCH: mentation appears normal and affect normal/bright    ASSESSMENT / PLAN:   1. Asymptomatic postmenopausal status    - DEXA HIP/PELVIS/SPINE - Future; Future    2. Routine general medical examination at a health care facility      3. CKD (chronic kidney disease) stage 3, GFR 30-59 ml/min    - Lipid panel reflex to direct LDL Fasting  - Albumin Random " "Urine Quantitative with Creat Ratio  - Comprehensive metabolic panel  - CBC with platelets  - TSH with free T4 reflex  - lisinopril (PRINIVIL/ZESTRIL) 2.5 MG tablet; Take 1 tablet (2.5 mg) by mouth daily  Dispense: 90 tablet; Refill: 3    4. CARDIOVASCULAR SCREENING; LDL GOAL LESS THAN 100    - Lipid panel reflex to direct LDL Fasting  - Comprehensive metabolic panel    5. Other specified hypothyroidism    - TSH with free T4 reflex  - levothyroxine (SYNTHROID/LEVOTHROID) 50 MCG tablet; Take 1 tablet (50 mcg) by mouth daily Take one by mouth daily  Dispense: 90 tablet; Refill: 3    6. Persistent insomnia    - traZODone (DESYREL) 50 MG tablet; Take 1-2 tablets ( mg) by mouth nightly as needed for sleep  Dispense: 60 tablet; Refill: 11    End of Life Planning:  Patient currently has an advanced directive: Yes.  Practitioner is supportive of decision.    COUNSELING:  Reviewed preventive health counseling, as reflected in patient instructions       Regular exercise       Healthy diet/nutrition       Vision screening       Hearing screening       Aspirin Prophylaxsis        Estimated body mass index is 31.64 kg/(m^2) as calculated from the following:    Height as of 1/31/18: 5' 6\" (1.676 m).    Weight as of 1/31/18: 196 lb (88.9 kg).     reports that she has never smoked. She has never used smokeless tobacco.      Appropriate preventive services were discussed with this patient, including applicable screening as appropriate for cardiovascular disease, diabetes, osteopenia/osteoporosis, and glaucoma.  As appropriate for age/gender, discussed screening for colorectal cancer, prostate cancer, breast cancer, and cervical cancer. Checklist reviewing preventive services available has been given to the patient.    Reviewed patients plan of care and provided an AVS. The Basic Care Plan (routine screening as documented in Health Maintenance) for Tonja meets the Care Plan requirement. This Care Plan has been established and " reviewed with the Patient.    Counseling Resources:  ATP IV Guidelines  Pooled Cohorts Equation Calculator  Breast Cancer Risk Calculator  FRAX Risk Assessment  ICSI Preventive Guidelines  Dietary Guidelines for Americans, 2010  USDA's MyPlate  ASA Prophylaxis  Lung CA Screening    Ramona Ann Aaseby-Aguilera, PA-C  [facility]  Answers for HPI/ROS submitted by the patient on 4/29/2018   PHQ-2 Score: 0

## 2018-04-30 NOTE — MR AVS SNAPSHOT
After Visit Summary   4/30/2018    Tonja Nieves    MRN: [id number]           Patient Information     Date Of Birth          1945        Visit Information        Provider Department      4/30/2018 10:00 AM Aaseby-Aguilera, Ramona Ann, PA-C [facility]        Today's Diagnoses     CKD (chronic kidney disease) stage 3, GFR 30-59 ml/min    -  1    Asymptomatic postmenopausal status        Routine general medical examination at a health care facility        CARDIOVASCULAR SCREENING; LDL GOAL LESS THAN 100        Other specified hypothyroidism        Persistent insomnia          Care Instructions      Preventive Health Recommendations  Female Ages 65 +    Yearly exam:     See your health care provider every year in order to  o Review health changes.   o Discuss preventive care.    o Review your medicines if your doctor has prescribed any.      You no longer need a yearly Pap test unless you've had an abnormal Pap test in the past 10 years. If you have vaginal symptoms, such as bleeding or discharge, be sure to talk with your provider about a Pap test.      Every 1 to 2 years, have a mammogram.  If you are over 69, talk with your health care provider about whether or not you want to continue having screening mammograms.      Every 10 years, have a colonoscopy. Or, have a yearly FIT test (stool test). These exams will check for colon cancer.       Have a cholesterol test every 5 years, or more often if your doctor advises it.       Have a diabetes test (fasting glucose) every three years. If you are at risk for diabetes, you should have this test more often.       At age 65, have a bone density scan (DEXA) to check for osteoporosis (brittle bone disease).    Shots:    Get a flu shot each year.    Get a tetanus shot every 10 years.    Talk to your doctor about your pneumonia vaccines. There are now two you should receive - Pneumovax (PPSV 23) and Prevnar (PCV 13).    Talk to your doctor  about the shingles vaccine.    Talk to your doctor about the hepatitis B vaccine.    Nutrition:     Eat at least 5 servings of fruits and vegetables each day.      Eat whole-grain bread, whole-wheat pasta and brown rice instead of white grains and rice.      Talk to your provider about Calcium and Vitamin D.     Lifestyle    Exercise at least 150 minutes a week (30 minutes a day, 5 days a week). This will help you control your weight and prevent disease.      Limit alcohol to one drink per day.      No smoking.       Wear sunscreen to prevent skin cancer.       See your dentist twice a year for an exam and cleaning.      See your eye doctor every 1 to 2 years to screen for conditions such as glaucoma, macular degeneration, cataracts, etc           Follow-ups after your visit        Future tests that were ordered for you today     Open Future Orders        Priority Expected Expires Ordered    DEXA HIP/PELVIS/SPINE - Future Routine  4/30/2019 4/30/2018            Who to contact     If you have questions or need follow up information about today's clinic visit or your schedule please contact [facility] directly at [phone].  Normal or non-critical lab and imaging results will be communicated to you by Premisehart, letter or phone within 4 business days after the clinic has received the results. If you do not hear from us within 7 days, please contact the clinic through SocialComt or phone. If you have a critical or abnormal lab result, we will notify you by phone as soon as possible.  Submit refill requests through Brozengo or call your pharmacy and they will forward the refill request to us. Please allow 3 business days for your refill to be completed.          Additional Information About Your Visit        Brozengo Information     Brozengo gives you secure access to your electronic health record. If you see a primary care provider, you can also send messages to your care team and make appointments. If you  "have questions, please call your primary care clinic.  If you do not have a primary care provider, please call [phone] and they will assist you.        Care EveryWhere ID     This is your Care EveryWhere ID. This could be used by other organizations to access your [place] medical records  [id number]        Your Vitals Were     Pulse Temperature Respirations Height Pulse Oximetry Breastfeeding?    52 98.2  F (36.8  C) (Oral) 16 5' 6\" (1.676 m) 98% No    BMI (Body Mass Index)                   31.7 kg/m2            Blood Pressure from Last 3 Encounters:   04/30/18 110/70   01/31/18 112/66   04/28/17 110/62    Weight from Last 3 Encounters:   04/30/18 196 lb 6.4 oz (89.1 kg)   01/31/18 196 lb (88.9 kg)   04/28/17 193 lb 1.6 oz (87.6 kg)              We Performed the Following     Albumin Random Urine Quantitative with Creat Ratio     CBC with platelets     Comprehensive metabolic panel     Lipid panel reflex to direct LDL Fasting     TSH with free T4 reflex          Today's Medication Changes          These changes are accurate as of 4/30/18 10:46 AM.  If you have any questions, ask your nurse or doctor.               Start taking these medicines.        Dose/Directions    traZODone 50 MG tablet   Commonly known as:  DESYREL   Used for:  Persistent insomnia   Started by:  Aaseby-Aguilera, Ramona Ann, PA-C        Dose:   mg   Take 1-2 tablets ( mg) by mouth nightly as needed for sleep   Quantity:  60 tablet   Refills:  11            Where to get your medicines      These medications were sent to [facility] [address] [place] [address]     Phone:  [phone]     levothyroxine 50 MCG tablet    lisinopril 2.5 MG tablet    traZODone 50 MG tablet                Primary Care Provider Office Phone # Fax #    Ramona Ann Aaseby-Aguilera, PA-C [phone] [address]  [facility] [address]        Equal Access to Services "     LAI [facility]: Hadii aad ku bethanie Hung, waaxda luqadaha, qaybta kaalmada jorge, savana calli stacibenny silva hemanthnile price . So [facility] [phone].    ATENCIÓN: Si habla esplupillo, tiene a treviño disposición servicios gratuitos de asistencia lingüística. Llame al [phone].    We comply with applicable federal civil rights laws and [place] laws. We do not discriminate on the basis of race, color, national origin, age, disability, sex, sexual orientation, or gender identity.            Thank you!     Thank you for choosing [facility]  for your care. Our goal is always to provide you with excellent care. Hearing back from our patients is one way we can continue to improve our services. Please take a few minutes to complete the written survey that you may receive in the mail after your visit with us. Thank you!             Your Updated Medication List - Protect others around you: Learn how to safely use, store and throw away your medicines at www.disposemymeds.org.          This list is accurate as of 4/30/18 10:46 AM.  Always use your most recent med list.                   Brand Name Dispense Instructions for use Diagnosis    CALCIUM CITRATE + D PO      Reported on 4/28/2017        Cranberry 500 MG Caps      Reported on 4/28/2017        GLUCOSAMINE CHONDR COMPLEX PO      Reported on 4/28/2017        L-Lysine 500 MG Tabs      Take  by mouth. Take one daily        levothyroxine 50 MCG tablet    SYNTHROID/LEVOTHROID    90 tablet    Take 1 tablet (50 mcg) by mouth daily Take one by mouth daily    Other specified hypothyroidism       lisinopril 2.5 MG tablet    PRINIVIL/Zestril    90 tablet    Take 1 tablet (2.5 mg) by mouth daily    CKD (chronic kidney disease) stage 3, GFR 30-59 ml/min       MULTI-VITAMIN PO      Reported on 4/28/2017        oxybutynin 3.9 MG/24HR BIW patch    OXYTROL    8 patch    Place 1 patch onto the skin twice a week    Urge incontinence of urine       traZODone 50 MG  tablet    DESYREL    60 tablet    Take 1-2 tablets ( mg) by mouth nightly as needed for sleep    Persistent insomnia

## 2018-04-30 NOTE — NURSING NOTE
"Chief Complaint   Patient presents with     Physical       Initial /70 (BP Location: Right arm, Patient Position: Chair, Cuff Size: Adult Large)  Pulse 52  Temp 98.2  F (36.8  C) (Oral)  Resp 16  Ht 5' 6\" (1.676 m)  Wt 196 lb 6.4 oz (89.1 kg)  SpO2 98%  Breastfeeding? No  BMI 31.7 kg/m2 Estimated body mass index is 31.7 kg/(m^2) as calculated from the following:    Height as of this encounter: 5' 6\" (1.676 m).    Weight as of this encounter: 196 lb 6.4 oz (89.1 kg).  Medication Reconciliation: complete      Health Maintenance addressed:  Blood work     N/a    ADARSH Rojas        "

## 2018-05-01 LAB
ALBUMIN SERPL-MCNC: 3.7 G/DL (ref 3.4–5)
ALP SERPL-CCNC: 85 U/L (ref 40–150)
ALT SERPL W P-5'-P-CCNC: 31 U/L (ref 0–50)
ANION GAP SERPL CALCULATED.3IONS-SCNC: 7 MMOL/L (ref 3–14)
AST SERPL W P-5'-P-CCNC: 21 U/L (ref 0–45)
BILIRUB SERPL-MCNC: 0.5 MG/DL (ref 0.2–1.3)
BUN SERPL-MCNC: 33 MG/DL (ref 7–30)
CALCIUM SERPL-MCNC: 8.6 MG/DL (ref 8.5–10.1)
CHLORIDE SERPL-SCNC: 114 MMOL/L (ref 94–109)
CHOLEST SERPL-MCNC: 236 MG/DL
CO2 SERPL-SCNC: 21 MMOL/L (ref 20–32)
CREAT SERPL-MCNC: 1.34 MG/DL (ref 0.52–1.04)
CREAT UR-MCNC: 105 MG/DL
GFR SERPL CREATININE-BSD FRML MDRD: 39 ML/MIN/1.7M2
GLUCOSE SERPL-MCNC: 99 MG/DL (ref 70–99)
HDLC SERPL-MCNC: 82 MG/DL
LDLC SERPL CALC-MCNC: 135 MG/DL
MICROALBUMIN UR-MCNC: 5 MG/L
MICROALBUMIN/CREAT UR: 4.77 MG/G CR (ref 0–25)
NONHDLC SERPL-MCNC: 154 MG/DL
POTASSIUM SERPL-SCNC: 4.3 MMOL/L (ref 3.4–5.3)
PROT SERPL-MCNC: 6.8 G/DL (ref 6.8–8.8)
SODIUM SERPL-SCNC: 142 MMOL/L (ref 133–144)
TRIGL SERPL-MCNC: 93 MG/DL
TSH SERPL DL<=0.005 MIU/L-ACNC: 1.58 MU/L (ref 0.4–4)

## 2018-08-29 ENCOUNTER — RADIANT APPOINTMENT (OUTPATIENT)
Dept: MAMMOGRAPHY | Facility: CLINIC | Age: 73
End: 2018-08-29
Attending: PHYSICIAN ASSISTANT
Payer: MEDICARE

## 2018-08-29 DIAGNOSIS — Z12.31 VISIT FOR SCREENING MAMMOGRAM: ICD-10-CM

## 2018-08-29 PROCEDURE — 77067 SCR MAMMO BI INCL CAD: CPT | Mod: TC

## 2018-09-17 ENCOUNTER — RADIANT APPOINTMENT (OUTPATIENT)
Dept: BONE DENSITY | Facility: CLINIC | Age: 73
End: 2018-09-17
Attending: PHYSICIAN ASSISTANT
Payer: MEDICARE

## 2018-09-17 ENCOUNTER — RADIANT APPOINTMENT (OUTPATIENT)
Dept: BONE DENSITY | Facility: CLINIC | Age: 73
End: 2018-09-17
Payer: MEDICARE

## 2018-09-17 DIAGNOSIS — Z78.0 ASYMPTOMATIC MENOPAUSAL STATE: ICD-10-CM

## 2018-09-17 DIAGNOSIS — Z78.0 ASYMPTOMATIC POSTMENOPAUSAL STATUS: ICD-10-CM

## 2018-09-17 PROCEDURE — 77080 DXA BONE DENSITY AXIAL: CPT | Mod: 59 | Performed by: INTERNAL MEDICINE

## 2018-09-17 PROCEDURE — 77081 DXA BONE DENSITY APPENDICULR: CPT | Performed by: INTERNAL MEDICINE

## 2018-10-30 ENCOUNTER — OFFICE VISIT (OUTPATIENT)
Dept: FAMILY MEDICINE | Facility: CLINIC | Age: 73
End: 2018-10-30
Payer: MEDICARE

## 2018-10-30 VITALS
DIASTOLIC BLOOD PRESSURE: 69 MMHG | SYSTOLIC BLOOD PRESSURE: 99 MMHG | BODY MASS INDEX: 30.13 KG/M2 | HEART RATE: 59 BPM | WEIGHT: 192 LBS | TEMPERATURE: 98.2 F | HEIGHT: 67 IN

## 2018-10-30 DIAGNOSIS — N30.00 ACUTE CYSTITIS WITHOUT HEMATURIA: Primary | ICD-10-CM

## 2018-10-30 DIAGNOSIS — R30.0 DYSURIA: ICD-10-CM

## 2018-10-30 LAB
ALBUMIN UR-MCNC: NEGATIVE MG/DL
APPEARANCE UR: ABNORMAL
BACTERIA #/AREA URNS HPF: ABNORMAL /HPF
BILIRUB UR QL STRIP: NEGATIVE
COLOR UR AUTO: YELLOW
GLUCOSE UR STRIP-MCNC: NEGATIVE MG/DL
HGB UR QL STRIP: ABNORMAL
KETONES UR STRIP-MCNC: NEGATIVE MG/DL
LEUKOCYTE ESTERASE UR QL STRIP: ABNORMAL
NITRATE UR QL: NEGATIVE
NON-SQ EPI CELLS #/AREA URNS LPF: ABNORMAL /LPF
PH UR STRIP: 6 PH (ref 5–7)
RBC #/AREA URNS AUTO: ABNORMAL /HPF
SOURCE: ABNORMAL
SP GR UR STRIP: 1.02 (ref 1–1.03)
UROBILINOGEN UR STRIP-ACNC: 0.2 EU/DL (ref 0.2–1)
WBC #/AREA URNS AUTO: ABNORMAL /HPF

## 2018-10-30 PROCEDURE — 81001 URINALYSIS AUTO W/SCOPE: CPT | Performed by: FAMILY MEDICINE

## 2018-10-30 PROCEDURE — 99213 OFFICE O/P EST LOW 20 MIN: CPT | Performed by: FAMILY MEDICINE

## 2018-10-30 RX ORDER — SULFAMETHOXAZOLE/TRIMETHOPRIM 800-160 MG
1 TABLET ORAL 2 TIMES DAILY
Qty: 6 TABLET | Refills: 0 | Status: SHIPPED | OUTPATIENT
Start: 2018-10-30 | End: 2018-11-02

## 2018-10-30 NOTE — MR AVS SNAPSHOT
"              After Visit Summary   10/30/2018    Tonja Nieves    MRN: [id number]           Patient Information     Date Of Birth          1945        Visit Information        Provider Department      10/30/2018 3:00 PM Reta Smith MD [facility]        Today's Diagnoses     Acute cystitis without hematuria    -  1    Dysuria           Follow-ups after your visit        Follow-up notes from your care team     Return in about 1 year (around 10/30/2019) for Yearly Physical Exam.      Who to contact     If you have questions or need follow up information about today's clinic visit or your schedule please contact [facility] directly at [phone].  Normal or non-critical lab and imaging results will be communicated to you by MyChart, letter or phone within 4 business days after the clinic has received the results. If you do not hear from us within 7 days, please contact the clinic through [employer] or phone. If you have a critical or abnormal lab result, we will notify you by phone as soon as possible.  Submit refill requests through [employer] or call your pharmacy and they will forward the refill request to us. Please allow 3 business days for your refill to be completed.          Additional Information About Your Visit        MyChart Information     [employer] gives you secure access to your electronic health record. If you see a primary care provider, you can also send messages to your care team and make appointments. If you have questions, please call your primary care clinic.  If you do not have a primary care provider, please call [phone] and they will assist you.        Care EveryWhere ID     This is your Care EveryWhere ID. This could be used by other organizations to access your [place] medical records  [id number]        Your Vitals Were     Pulse Temperature Height Breastfeeding? BMI (Body Mass Index)       59 98.2  F (36.8  C) (Oral) 5' 7\" (1.702 m) No 30.07 " kg/m2        Blood Pressure from Last 3 Encounters:   10/30/18 99/69   04/30/18 110/70   01/31/18 112/66    Weight from Last 3 Encounters:   10/30/18 192 lb (87.1 kg)   04/30/18 196 lb 6.4 oz (89.1 kg)   01/31/18 196 lb (88.9 kg)              We Performed the Following     UA reflex to Microscopic and Culture     Urine Microscopic          Today's Medication Changes          These changes are accurate as of 10/30/18  4:25 PM.  If you have any questions, ask your nurse or doctor.               Start taking these medicines.        Dose/Directions    sulfamethoxazole-trimethoprim 800-160 MG per tablet   Commonly known as:  BACTRIM DS/SEPTRA DS   Used for:  Acute cystitis without hematuria   Started by:  Reta Smith MD        Dose:  1 tablet   Take 1 tablet by mouth 2 times daily for 3 days   Quantity:  6 tablet   Refills:  0         Stop taking these medicines if you haven't already. Please contact your care team if you have questions.     traZODone 50 MG tablet   Commonly known as:  DESYREL   Stopped by:  Reta Smith MD                Where to get your medicines      Some of these will need a paper prescription and others can be bought over the counter.  Ask your nurse if you have questions.     Bring a paper prescription for each of these medications     sulfamethoxazole-trimethoprim 800-160 MG per tablet                Primary Care Provider Office Phone # Fax #    Evelyn Ann Aaseby-Aguilera, PA-C [phone] [address] [address] [facility] [address]        Equal Access to Services     [facility] AH: Hadii evie ku hadasho Soomaali, waaxda luqadaha, qaybta kaalmada adeegyada, waxay calli hilliard. So [facility] [phone].    ATENCIÓN: Si habla español, tiene a treviño disposición servicios gratuitos de asistencia lingüística. Llame al [phone].    We comply with applicable federal civil rights laws and [place] laws. We do not discriminate on the basis of race,  color, national origin, age, disability, sex, sexual orientation, or gender identity.            Thank you!     Thank you for choosing [facility]  for your care. Our goal is always to provide you with excellent care. Hearing back from our patients is one way we can continue to improve our services. Please take a few minutes to complete the written survey that you may receive in the mail after your visit with us. Thank you!             Your Updated Medication List - Protect others around you: Learn how to safely use, store and throw away your medicines at www.disposemymeds.org.          This list is accurate as of 10/30/18  4:25 PM.  Always use your most recent med list.                   Brand Name Dispense Instructions for use Diagnosis    CALCIUM CITRATE + D PO      Reported on 4/28/2017        Cranberry 500 MG Caps      Reported on 4/28/2017        GLUCOSAMINE CHONDR COMPLEX PO      Reported on 4/28/2017        L-Lysine 500 MG Tabs      Take  by mouth. Take one daily        levothyroxine 50 MCG tablet    SYNTHROID/LEVOTHROID    90 tablet    Take 1 tablet (50 mcg) by mouth daily Take one by mouth daily    Other specified hypothyroidism       lisinopril 2.5 MG tablet    PRINIVIL/Zestril    90 tablet    Take 1 tablet (2.5 mg) by mouth daily    CKD (chronic kidney disease) stage 3, GFR 30-59 ml/min (H)       MULTI-VITAMIN PO      Reported on 4/28/2017        oxybutynin 3.9 MG/24HR BIW patch    OXYTROL    8 patch    Place 1 patch onto the skin twice a week    Urge incontinence of urine       sulfamethoxazole-trimethoprim 800-160 MG per tablet    BACTRIM DS/SEPTRA DS    6 tablet    Take 1 tablet by mouth 2 times daily for 3 days    Acute cystitis without hematuria

## 2018-10-30 NOTE — PROGRESS NOTES
SUBJECTIVE:   Tonja Nieves is a 73 year old female who presents to clinic today for the following health issues:      URINARY TRACT SYMPTOMS      Duration: 2 days    Description  dysuria    Intensity:  moderate    Accompanying signs and symptoms:  Fever/chills: no   Flank pain no   Nausea and vomiting: no   Vaginal symptoms: none  Abdominal/Pelvic Pain: no     History  History of frequent UTI's: no   History of kidney stones: no   Sexually Active: no   Possibility of pregnancy: No    Precipitating or alleviating factors: worse prior to bedtime    Therapies tried and outcome: cranberry juice  and increase fluid intake   Outcome: helping some          Problem list and histories reviewed & adjusted, as indicated.  Additional history: as documented    Patient Active Problem List   Diagnosis     Advanced directives, counseling/discussion     Urge incontinence of urine     CKD (chronic kidney disease) stage 3, GFR 30-59 ml/min (H)     CARDIOVASCULAR SCREENING; LDL GOAL LESS THAN 100     Other specified hypothyroidism     Past Surgical History:   Procedure Laterality Date     bilateral hip replacement  2006     COLONOSCOPY       GASTRIC BYPASS  2005    rounx-en-y     TONSILLECTOMY      age 9       Social History   Substance Use Topics     Smoking status: Never Smoker     Smokeless tobacco: Never Used     Alcohol use No     Family History   Problem Relation Age of Onset     Alzheimer Disease Mother      Hypertension Mother      Diabetes Father      HEART DISEASE Father      Genetic Disorder Brother 55     ms     Thyroid Disease Sister 60     thyroid      Thyroid Disease Daughter      Diabetes Paternal Grandmother            Reviewed and updated as needed this visit by clinical staff  Allergies       Reviewed and updated as needed this visit by Provider         ROS:  Constitutional, HEENT, cardiovascular, pulmonary, gi and gu systems are negative, except as otherwise noted.    OBJECTIVE:     BP 99/69 (BP Location: Right  "arm, Patient Position: Chair, Cuff Size: Adult Large)  Pulse 59  Temp 98.2  F (36.8  C) (Oral)  Ht 5' 7\" (1.702 m)  Wt 192 lb (87.1 kg)  Breastfeeding? No  BMI 30.07 kg/m2  Body mass index is 30.07 kg/(m^2).  GENERAL: healthy, alert and no distress    Diagnostic Test Results:  Results for orders placed or performed in visit on 10/30/18 (from the past 24 hour(s))   UA reflex to Microscopic and Culture   Result Value Ref Range    Color Urine Yellow     Appearance Urine Cloudy     Glucose Urine Negative NEG^Negative mg/dL    Bilirubin Urine Negative NEG^Negative    Ketones Urine Negative NEG^Negative mg/dL    Specific Gravity Urine 1.020 1.003 - 1.035    Blood Urine Trace (A) NEG^Negative    pH Urine 6.0 5.0 - 7.0 pH    Protein Albumin Urine Negative NEG^Negative mg/dL    Urobilinogen Urine 0.2 0.2 - 1.0 EU/dL    Nitrite Urine Negative NEG^Negative    Leukocyte Esterase Urine Trace (A) NEG^Negative    Source Midstream Urine    Urine Microscopic   Result Value Ref Range    WBC Urine 5-10 (A) OTO5^0 - 5 /HPF    RBC Urine 5-10 (A) OTO2^O - 2 /HPF    Squamous Epithelial /LPF Urine Few FEW^Few /LPF    Bacteria Urine Many (A) NEG^Negative /HPF       ASSESSMENT/PLAN:     1. Acute cystitis without hematuria - symptoms typical of UTI, UA representative of early UTI - suggest treat, culture urine. If symptoms fail to improve, suggest recheck  exam  - sulfamethoxazole-trimethoprim (BACTRIM DS/SEPTRA DS) 800-160 MG per tablet; Take 1 tablet by mouth 2 times daily for 3 days  Dispense: 6 tablet; Refill: 0    2. Dysuria  - UA reflex to Microscopic and Culture  - Urine Microscopic    Reta Smith MD  [facility]    "

## 2019-04-29 ASSESSMENT — ENCOUNTER SYMPTOMS
JOINT SWELLING: 0
DYSURIA: 0
HEARTBURN: 0
DIZZINESS: 0
COUGH: 0
PALPITATIONS: 0
MYALGIAS: 1
ARTHRALGIAS: 0
ABDOMINAL PAIN: 0
EYE PAIN: 0
CHILLS: 0
FREQUENCY: 1
DIARRHEA: 0
HEMATURIA: 0
HEADACHES: 0
WEAKNESS: 0
NERVOUS/ANXIOUS: 0
HEMATOCHEZIA: 0
SHORTNESS OF BREATH: 0
CONSTIPATION: 0
SORE THROAT: 0
BREAST MASS: 0
NAUSEA: 0
FEVER: 0
PARESTHESIAS: 0

## 2019-04-29 ASSESSMENT — ACTIVITIES OF DAILY LIVING (ADL): CURRENT_FUNCTION: NO ASSISTANCE NEEDED

## 2019-05-02 ENCOUNTER — OFFICE VISIT (OUTPATIENT)
Dept: FAMILY MEDICINE | Facility: CLINIC | Age: 74
End: 2019-05-02
Payer: MEDICARE

## 2019-05-02 VITALS
TEMPERATURE: 97.6 F | DIASTOLIC BLOOD PRESSURE: 62 MMHG | WEIGHT: 197.5 LBS | OXYGEN SATURATION: 97 % | BODY MASS INDEX: 31.74 KG/M2 | HEART RATE: 50 BPM | RESPIRATION RATE: 17 BRPM | HEIGHT: 66 IN | SYSTOLIC BLOOD PRESSURE: 109 MMHG

## 2019-05-02 DIAGNOSIS — Z12.11 SPECIAL SCREENING FOR MALIGNANT NEOPLASMS, COLON: ICD-10-CM

## 2019-05-02 DIAGNOSIS — Z00.00 ROUTINE GENERAL MEDICAL EXAMINATION AT A HEALTH CARE FACILITY: Primary | ICD-10-CM

## 2019-05-02 DIAGNOSIS — Z13.1 SCREENING FOR DIABETES MELLITUS: ICD-10-CM

## 2019-05-02 DIAGNOSIS — Z23 ENCOUNTER FOR IMMUNIZATION: ICD-10-CM

## 2019-05-02 DIAGNOSIS — Z12.31 VISIT FOR SCREENING MAMMOGRAM: ICD-10-CM

## 2019-05-02 DIAGNOSIS — Z13.0 SCREENING FOR BLOOD DISEASE: ICD-10-CM

## 2019-05-02 DIAGNOSIS — E03.8 OTHER SPECIFIED HYPOTHYROIDISM: ICD-10-CM

## 2019-05-02 DIAGNOSIS — N18.30 CKD (CHRONIC KIDNEY DISEASE) STAGE 3, GFR 30-59 ML/MIN (H): ICD-10-CM

## 2019-05-02 DIAGNOSIS — Z13.220 SCREENING, LIPID: ICD-10-CM

## 2019-05-02 LAB
ERYTHROCYTE [DISTWIDTH] IN BLOOD BY AUTOMATED COUNT: 14.5 % (ref 10–15)
HCT VFR BLD AUTO: 43.4 % (ref 35–47)
HGB BLD-MCNC: 13.9 G/DL (ref 11.7–15.7)
MCH RBC QN AUTO: 29.3 PG (ref 26.5–33)
MCHC RBC AUTO-ENTMCNC: 32 G/DL (ref 31.5–36.5)
MCV RBC AUTO: 91 FL (ref 78–100)
PLATELET # BLD AUTO: 237 10E9/L (ref 150–450)
RBC # BLD AUTO: 4.75 10E12/L (ref 3.8–5.2)
WBC # BLD AUTO: 5.2 10E9/L (ref 4–11)

## 2019-05-02 PROCEDURE — G0009 ADMIN PNEUMOCOCCAL VACCINE: HCPCS | Performed by: PHYSICIAN ASSISTANT

## 2019-05-02 PROCEDURE — 90670 PCV13 VACCINE IM: CPT | Performed by: PHYSICIAN ASSISTANT

## 2019-05-02 PROCEDURE — 85027 COMPLETE CBC AUTOMATED: CPT | Performed by: PHYSICIAN ASSISTANT

## 2019-05-02 PROCEDURE — 84443 ASSAY THYROID STIM HORMONE: CPT | Performed by: PHYSICIAN ASSISTANT

## 2019-05-02 PROCEDURE — 80061 LIPID PANEL: CPT | Performed by: PHYSICIAN ASSISTANT

## 2019-05-02 PROCEDURE — 36415 COLL VENOUS BLD VENIPUNCTURE: CPT | Performed by: PHYSICIAN ASSISTANT

## 2019-05-02 PROCEDURE — G0439 PPPS, SUBSEQ VISIT: HCPCS | Performed by: PHYSICIAN ASSISTANT

## 2019-05-02 PROCEDURE — 80053 COMPREHEN METABOLIC PANEL: CPT | Performed by: PHYSICIAN ASSISTANT

## 2019-05-02 PROCEDURE — 82043 UR ALBUMIN QUANTITATIVE: CPT | Performed by: PHYSICIAN ASSISTANT

## 2019-05-02 RX ORDER — LISINOPRIL 2.5 MG/1
2.5 TABLET ORAL DAILY
Qty: 90 TABLET | Refills: 3 | Status: SHIPPED | OUTPATIENT
Start: 2019-05-02 | End: 2020-04-04

## 2019-05-02 RX ORDER — LEVOTHYROXINE SODIUM 50 UG/1
50 TABLET ORAL DAILY
Qty: 90 TABLET | Refills: 3 | Status: SHIPPED | OUTPATIENT
Start: 2019-05-02 | End: 2020-04-04

## 2019-05-02 ASSESSMENT — MIFFLIN-ST. JEOR: SCORE: 1422.6

## 2019-05-02 ASSESSMENT — ACTIVITIES OF DAILY LIVING (ADL): CURRENT_FUNCTION: NO ASSISTANCE NEEDED

## 2019-05-02 NOTE — PROGRESS NOTES
"SUBJECTIVE:   Tonja Nieves is a 74 year old female who presents for Preventive Visit.      Are you in the first 12 months of your Medicare coverage?  No    Healthy Habits:     In general, how would you rate your overall health?  Excellent    Frequency of exercise:  None    Do you usually eat at least 4 servings of fruit and vegetables a day, include whole grains    & fiber and avoid regularly eating high fat or \"junk\" foods?  No    Taking medications regularly:  Yes    Medication side effects:  None    Ability to successfully perform activities of daily living:  No assistance needed    Home Safety:  No safety concerns identified    Hearing Impairment:  No hearing concerns    In the past 6 months, have you been bothered by leaking of urine? Yes    In general, how would you rate your overall mental or emotional health?  Excellent      PHQ-2 Total Score: 0    Additional concerns today:  Yes    Do you feel safe in your environment? Yes    Do you have a Health Care Directive? Yes: Advance Directive has been received and scanned.      Fall risk     click delete button to remove this line now  Cognitive Screening   1) Repeat 3 items (Leader, Season, Table)    2) Clock draw: NORMAL  3) 3 item recall: Recalls 2 objects   Results: NORMAL clock, 1-2 items recalled: COGNITIVE IMPAIRMENT LESS LIKELY    Mini-CogTM Copyright S Joie. Licensed by the author for use in [facility]; reprinted with permission (soob@.[email or web address]). All rights reserved.      Do you have sleep apnea, excessive snoring or daytime drowsiness?: no    Reviewed and updated as needed this visit by clinical staff         Reviewed and updated as needed this visit by Provider        Social History     Tobacco Use     Smoking status: Never Smoker     Smokeless tobacco: Never Used   Substance Use Topics     Alcohol use: No     Alcohol/week: 0.0 oz     If you drink alcohol do you typically have >3 drinks per day or >7 drinks per week? No    Alcohol Use " 4/29/2019   Prescreen: >3 drinks/day or >7 drinks/week? Not Applicable   Prescreen: >3 drinks/day or >7 drinks/week? -   No flowsheet data found.            Current providers sharing in care for this patient include:   Patient Care Team:  Aaseby-Aguilera, Ramona Ann, PA-C as PCP - General (Family Practice)  Aaseby-Aguilera, Ramona Ann, PA-C as Assigned PCP    The following health maintenance items are reviewed in Epic and correct as of today:  Health Maintenance   Topic Date Due     ZOSTER IMMUNIZATION (2 of 3) 09/05/2007     PNEUMOCOCCAL IMMUNIZATION 65+ LOW/MEDIUM RISK (2 of 2 - PPSV23) 04/13/2016     BMP Q1 YR  04/30/2019     HEMOGLOBIN Q1 YR  04/30/2019     LIPID MONITORING Q1 YEAR  04/30/2019     MICROALBUMIN Q1 YEAR  04/30/2019     MEDICARE ANNUAL WELLNESS VISIT  04/30/2019     FALL RISK ASSESSMENT  04/30/2019     MAMMO SCREEN Q2 YR (SYSTEM ASSIGNED)  08/29/2020     ADVANCE DIRECTIVE PLANNING Q5 YRS  04/28/2022     DTAP/TDAP/TD IMMUNIZATION (2 - Td) 04/08/2023     COLON CANCER SCREEN (SYSTEM ASSIGNED)  05/21/2023     DEXA SCAN SCREENING (SYSTEM ASSIGNED)  Completed     PHQ-2  Completed     INFLUENZA VACCINE  Completed     HEPATITIS C SCREENING  Completed     IPV IMMUNIZATION  Aged Out     MENINGITIS IMMUNIZATION  Aged Out     BP Readings from Last 3 Encounters:   05/02/19 109/62   10/30/18 99/69   04/30/18 110/70    Wt Readings from Last 3 Encounters:   05/02/19 89.6 kg (197 lb 8 oz)   10/30/18 87.1 kg (192 lb)   04/30/18 89.1 kg (196 lb 6.4 oz)                  Patient Active Problem List   Diagnosis     Advanced directives, counseling/discussion     Urge incontinence of urine     CKD (chronic kidney disease) stage 3, GFR 30-59 ml/min (H)     CARDIOVASCULAR SCREENING; LDL GOAL LESS THAN 100     Other specified hypothyroidism     Past Surgical History:   Procedure Laterality Date     bilateral hip replacement  2006     COLONOSCOPY       GASTRIC BYPASS  2005    rounx-en-y     TONSILLECTOMY      age 9      "  Social History     Tobacco Use     Smoking status: Never Smoker     Smokeless tobacco: Never Used   Substance Use Topics     Alcohol use: No     Alcohol/week: 0.0 oz     Family History   Problem Relation Age of Onset     Alzheimer Disease Mother      Hypertension Mother      Diabetes Father      Heart Disease Father      Genetic Disorder Brother 55        ms     Thyroid Disease Sister 60        thyroid      Thyroid Disease Daughter      Diabetes Paternal Grandmother          No Known Allergies  Recent Labs   Lab Test 04/30/18  1053 04/28/17  1001 04/26/16  1058   * 117* 113*   HDL 82 76 92   TRIG 93 118 90   ALT 31 26 29   CR 1.34* 1.34* 1.25*   GFRESTIMATED 39* 39* 42*   GFRESTBLACK 47* 47* 51*   POTASSIUM 4.3 5.3 4.7   TSH 1.58 2.20 1.35      Pneumonia Vaccine:Adults age 65+ who received Pneumovax (PPSV23) at 65 years or older: Should be given PCV13 > 1 year after their most recent PPSV23    Review of Systems  Constitutional, HEENT, cardiovascular, pulmonary, GI, , musculoskeletal, neuro, skin, endocrine and psych systems are negative, except as otherwise noted.    OBJECTIVE:   There were no vitals taken for this visit. Estimated body mass index is 30.07 kg/m  as calculated from the following:    Height as of 10/30/18: 1.702 m (5' 7\").    Weight as of 10/30/18: 87.1 kg (192 lb).  Physical Exam  GENERAL APPEARANCE: healthy, alert and no distress  EYES: Eyes grossly normal to inspection, PERRL and conjunctivae and sclerae normal  HENT: ear canals and TM's normal, nose and mouth without ulcers or lesions, oropharynx clear and oral mucous membranes moist  NECK: no adenopathy, no asymmetry, masses, or scars and thyroid normal to palpation  RESP: lungs clear to auscultation - no rales, rhonchi or wheezes  BREAST: normal without masses, tenderness or nipple discharge and no palpable axillary masses or adenopathy  CV: regular rate and rhythm, normal S1 S2, no S3 or S4, no murmur, click or rub, no peripheral " "edema and peripheral pulses strong  ABDOMEN: soft, nontender, no hepatosplenomegaly, no masses and bowel sounds normal  MS: no musculoskeletal defects are noted and gait is age appropriate without ataxia  SKIN: no suspicious lesions or rashes  NEURO: Normal strength and tone, sensory exam grossly normal, mentation intact and speech normal  PSYCH: mentation appears normal and affect normal/bright    Diagnostic Test Results:  none     ASSESSMENT / PLAN:   1. Routine general medical examination at a health care facility      2. Screening for blood disease    - CBC with platelets    3. Screening for diabetes mellitus    - Comprehensive metabolic panel    4. Screening, lipid    - Lipid panel reflex to direct LDL Fasting    5. CKD (chronic kidney disease) stage 3, GFR 30-59 ml/min (H)    - Albumin Random Urine Quantitative with Creat Ratio  - lisinopril (PRINIVIL/ZESTRIL) 2.5 MG tablet; Take 1 tablet (2.5 mg) by mouth daily  Dispense: 90 tablet; Refill: 3    6. Other specified hypothyroidism    - TSH with free T4 reflex  - levothyroxine (SYNTHROID/LEVOTHROID) 50 MCG tablet; Take 1 tablet (50 mcg) by mouth daily Take one by mouth daily  Dispense: 90 tablet; Refill: 3    7. Visit for screening mammogram    - *MA Screening Digital Bilateral; Future    8. Special screening for malignant neoplasms, colon    - Fecal colorectal cancer screen (FIT); Future    9. Encounter for immunization    - ADMIN 1st VACCINE    End of Life Planning:  Patient currently has an advanced directive: Yes.  Practitioner is supportive of decision.    COUNSELING:  Reviewed preventive health counseling, as reflected in patient instructions    BP Readings from Last 1 Encounters:   10/30/18 99/69     Estimated body mass index is 30.07 kg/m  as calculated from the following:    Height as of 10/30/18: 1.702 m (5' 7\").    Weight as of 10/30/18: 87.1 kg (192 lb).      Weight management plan: Discussed healthy diet and exercise guidelines     reports that she " has never smoked. She has never used smokeless tobacco.      Appropriate preventive services were discussed with this patient, including applicable screening as appropriate for cardiovascular disease, diabetes, osteopenia/osteoporosis, and glaucoma.  As appropriate for age/gender, discussed screening for colorectal cancer, prostate cancer, breast cancer, and cervical cancer. Checklist reviewing preventive services available has been given to the patient.    Reviewed patients plan of care and provided an AVS. The Basic Care Plan (routine screening as documented in Health Maintenance) for Tonja meets the Care Plan requirement. This Care Plan has been established and reviewed with the Patient.    Counseling Resources:  ATP IV Guidelines  Pooled Cohorts Equation Calculator  Breast Cancer Risk Calculator  FRAX Risk Assessment  ICSI Preventive Guidelines  Dietary Guidelines for Americans, 2010  USDA's MyPlate  ASA Prophylaxis  Lung CA Screening    Ramona Ann Aaseby-Aguilera, PA-C  [facility]    Identified Health Risks:

## 2019-05-03 LAB
ALBUMIN SERPL-MCNC: 3.6 G/DL (ref 3.4–5)
ALP SERPL-CCNC: 72 U/L (ref 40–150)
ALT SERPL W P-5'-P-CCNC: 29 U/L (ref 0–50)
ANION GAP SERPL CALCULATED.3IONS-SCNC: 7 MMOL/L (ref 3–14)
AST SERPL W P-5'-P-CCNC: 22 U/L (ref 0–45)
BILIRUB SERPL-MCNC: 0.5 MG/DL (ref 0.2–1.3)
BUN SERPL-MCNC: 26 MG/DL (ref 7–30)
CALCIUM SERPL-MCNC: 8.6 MG/DL (ref 8.5–10.1)
CHLORIDE SERPL-SCNC: 113 MMOL/L (ref 94–109)
CHOLEST SERPL-MCNC: 239 MG/DL
CO2 SERPL-SCNC: 22 MMOL/L (ref 20–32)
CREAT SERPL-MCNC: 1.28 MG/DL (ref 0.52–1.04)
CREAT UR-MCNC: 104 MG/DL
GFR SERPL CREATININE-BSD FRML MDRD: 42 ML/MIN/{1.73_M2}
GLUCOSE SERPL-MCNC: 97 MG/DL (ref 70–99)
HDLC SERPL-MCNC: 93 MG/DL
LDLC SERPL CALC-MCNC: 126 MG/DL
MICROALBUMIN UR-MCNC: <5 MG/L
MICROALBUMIN/CREAT UR: NORMAL MG/G CR (ref 0–25)
NONHDLC SERPL-MCNC: 146 MG/DL
POTASSIUM SERPL-SCNC: 4.8 MMOL/L (ref 3.4–5.3)
PROT SERPL-MCNC: 7.1 G/DL (ref 6.8–8.8)
SODIUM SERPL-SCNC: 142 MMOL/L (ref 133–144)
TRIGL SERPL-MCNC: 99 MG/DL
TSH SERPL DL<=0.005 MIU/L-ACNC: 2.44 MU/L (ref 0.4–4)

## 2019-05-04 PROCEDURE — 82274 ASSAY TEST FOR BLOOD FECAL: CPT | Performed by: PHYSICIAN ASSISTANT

## 2019-05-06 DIAGNOSIS — Z12.11 SPECIAL SCREENING FOR MALIGNANT NEOPLASMS, COLON: ICD-10-CM

## 2019-05-06 LAB — HEMOCCULT STL QL IA: NEGATIVE

## 2019-09-03 ENCOUNTER — HOSPITAL ENCOUNTER (OUTPATIENT)
Dept: MAMMOGRAPHY | Facility: CLINIC | Age: 74
Discharge: HOME | End: 2019-09-03
Attending: PHYSICIAN ASSISTANT | Admitting: PHYSICIAN ASSISTANT
Payer: MEDICARE

## 2019-09-03 DIAGNOSIS — Z12.31 VISIT FOR SCREENING MAMMOGRAM: ICD-10-CM

## 2019-09-03 PROCEDURE — 77063 BREAST TOMOSYNTHESIS BI: CPT

## 2020-04-04 ENCOUNTER — MYC REFILL (OUTPATIENT)
Dept: FAMILY MEDICINE | Facility: CLINIC | Age: 75
End: 2020-04-04

## 2020-04-04 DIAGNOSIS — E03.8 OTHER SPECIFIED HYPOTHYROIDISM: ICD-10-CM

## 2020-04-04 DIAGNOSIS — N18.30 CKD (CHRONIC KIDNEY DISEASE) STAGE 3, GFR 30-59 ML/MIN (H): ICD-10-CM

## 2020-04-06 RX ORDER — LEVOTHYROXINE SODIUM 50 UG/1
50 TABLET ORAL DAILY
Qty: 90 TABLET | Refills: 0 | Status: SHIPPED | OUTPATIENT
Start: 2020-04-06 | End: 2020-04-13

## 2020-04-06 RX ORDER — LISINOPRIL 2.5 MG/1
2.5 TABLET ORAL DAILY
Qty: 90 TABLET | Refills: 0 | Status: SHIPPED | OUTPATIENT
Start: 2020-04-06 | End: 2020-04-13

## 2020-04-06 NOTE — TELEPHONE ENCOUNTER
Prescription approved per [facility] Refill Protocol.  Sheila David RN, BSN

## 2020-04-13 ENCOUNTER — TELEPHONE (OUTPATIENT)
Dept: FAMILY MEDICINE | Facility: CLINIC | Age: 75
End: 2020-04-13

## 2020-04-13 DIAGNOSIS — N18.30 CKD (CHRONIC KIDNEY DISEASE) STAGE 3, GFR 30-59 ML/MIN (H): ICD-10-CM

## 2020-04-13 DIAGNOSIS — E03.8 OTHER SPECIFIED HYPOTHYROIDISM: ICD-10-CM

## 2020-04-13 RX ORDER — LEVOTHYROXINE SODIUM 50 UG/1
50 TABLET ORAL DAILY
Qty: 90 TABLET | Refills: 0 | Status: SHIPPED | OUTPATIENT
Start: 2020-04-13 | End: 2020-09-09

## 2020-04-13 RX ORDER — LISINOPRIL 2.5 MG/1
2.5 TABLET ORAL DAILY
Qty: 90 TABLET | Refills: 0 | Status: SHIPPED | OUTPATIENT
Start: 2020-04-13 | End: 2020-09-09

## 2020-04-13 NOTE — TELEPHONE ENCOUNTER
Topic: Procedural Question       Can you help??  I have signed up for [facility] of my two prescriptions.  I need to have Ramona Aaseby-Aguilera call  [facility] at [phone] or FAX [phone] and have my Lisinopril 2.5 mg prescription refilled with them and NOT [facility].  (I just picked up my Lisinopril RX [id number] on April 10, 2020 so I am good for awhile with that Rx.)  Also the levothyroxin 50 mcg I take will need to be refilled in the future as well. I no longer will be using the [facility].  If you need more information from me please feel free to call me at [phone].

## 2020-09-08 ENCOUNTER — E-VISIT (OUTPATIENT)
Dept: FAMILY MEDICINE | Facility: CLINIC | Age: 75
End: 2020-09-08
Payer: MEDICARE

## 2020-09-08 DIAGNOSIS — Z53.9 ERRONEOUS ENCOUNTER--DISREGARD: Primary | ICD-10-CM

## 2020-09-09 ENCOUNTER — MYC REFILL (OUTPATIENT)
Dept: FAMILY MEDICINE | Facility: CLINIC | Age: 75
End: 2020-09-09

## 2020-09-09 DIAGNOSIS — N18.30 CKD (CHRONIC KIDNEY DISEASE) STAGE 3, GFR 30-59 ML/MIN (H): ICD-10-CM

## 2020-09-09 DIAGNOSIS — E03.8 OTHER SPECIFIED HYPOTHYROIDISM: ICD-10-CM

## 2020-09-09 RX ORDER — LISINOPRIL 2.5 MG/1
2.5 TABLET ORAL DAILY
Qty: 90 TABLET | Refills: 0 | Status: SHIPPED | OUTPATIENT
Start: 2020-09-09 | End: 2020-10-09

## 2020-09-09 RX ORDER — LEVOTHYROXINE SODIUM 50 UG/1
50 TABLET ORAL DAILY
Qty: 90 TABLET | Refills: 0 | Status: SHIPPED | OUTPATIENT
Start: 2020-09-09 | End: 2020-10-09

## 2020-09-09 NOTE — TELEPHONE ENCOUNTER
Routing refill request to provider for review/approval because:  Labs not current:  TSH, BP, creatinine, K    Patient has appt scheduled for 10/9/20    Killian DYE RN, BSN

## 2020-10-09 ENCOUNTER — VIRTUAL VISIT (OUTPATIENT)
Dept: FAMILY MEDICINE | Facility: CLINIC | Age: 75
End: 2020-10-09
Payer: MEDICARE

## 2020-10-09 VITALS — WEIGHT: 198.4 LBS | HEIGHT: 67 IN | BODY MASS INDEX: 31.14 KG/M2

## 2020-10-09 DIAGNOSIS — Z13.0 SCREENING FOR BLOOD DISEASE: ICD-10-CM

## 2020-10-09 DIAGNOSIS — Z12.11 SPECIAL SCREENING FOR MALIGNANT NEOPLASMS, COLON: ICD-10-CM

## 2020-10-09 DIAGNOSIS — N18.31 STAGE 3A CHRONIC KIDNEY DISEASE (H): ICD-10-CM

## 2020-10-09 DIAGNOSIS — N39.41 URGE INCONTINENCE OF URINE: ICD-10-CM

## 2020-10-09 DIAGNOSIS — Z00.00 ENCOUNTER FOR MEDICARE ANNUAL WELLNESS EXAM: Primary | ICD-10-CM

## 2020-10-09 DIAGNOSIS — E03.8 OTHER SPECIFIED HYPOTHYROIDISM: ICD-10-CM

## 2020-10-09 DIAGNOSIS — Z13.1 SCREENING FOR DIABETES MELLITUS: ICD-10-CM

## 2020-10-09 DIAGNOSIS — Z13.220 SCREENING, LIPID: ICD-10-CM

## 2020-10-09 PROCEDURE — G0439 PPPS, SUBSEQ VISIT: HCPCS | Mod: 95 | Performed by: PHYSICIAN ASSISTANT

## 2020-10-09 RX ORDER — LISINOPRIL 2.5 MG/1
2.5 TABLET ORAL DAILY
Qty: 90 TABLET | Refills: 3 | Status: ON HOLD | OUTPATIENT
Start: 2020-10-09 | End: 2021-04-09

## 2020-10-09 RX ORDER — LEVOTHYROXINE SODIUM 50 UG/1
50 TABLET ORAL DAILY
Qty: 90 TABLET | Refills: 3 | Status: SHIPPED | OUTPATIENT
Start: 2020-10-09 | End: 2021-07-29

## 2020-10-09 ASSESSMENT — MIFFLIN-ST. JEOR: SCORE: 1437.57

## 2020-10-09 NOTE — PROGRESS NOTES
"Tonja Nieves is a 75 year old female who is being evaluated via a billable video visit.      The patient has been notified of following:     \"This video visit will be conducted via a call between you and your physician/provider. We have found that certain health care needs can be provided without the need for an in-person physical exam.  This service lets us provide the care you need with a video conversation.  If a prescription is necessary we can send it directly to your pharmacy.  If lab work is needed we can place an order for that and you can then stop by our lab to have the test done at a later time.    Video visits are billed at different rates depending on your insurance coverage.  Please reach out to your insurance provider with any questions.    If during the course of the call the physician/provider feels a video visit is not appropriate, you will not be charged for this service.\"    Patient has given verbal consent for Video visit? Yes  How would you like to obtain your AVS? MyChart  If you are dropped from the video visit, the video invite should be resent to: Text to cell phone: [phone]  Will anyone else be joining your video visit? No    Subjective     Tonja Nieves is a 75 year old female who presents today via video visit for the following health issues:    HPI     Annual Wellness Visit    Patient has been advised of split billing requirements and indicates understanding: Yes     Are you in the first 12 months of your Medicare Part B coverage?  No    Physical Health:    In general, how would you rate your overall physical health? good    Outside of work, how many days during the week do you exercise?none    Outside of work, approximately how many minutes a day do you exercise?not applicable    If you drink alcohol do you typically have >3 drinks per day or >7 drinks per week? No    Do you usually eat at least 4 servings of fruit and vegetables a day, include whole grains & fiber and avoid " "regularly eating high fat or \"junk\" foods? Yes    Do you have any problems taking medications regularly? No    Do you have any side effects from medications? none    Needs assistance for the following daily activities: no assistance needed    Which of the following safety concerns are present in your home?  none identified     Hearing impairment: No    In the past 6 months, have you been bothered by leaking of urine? yes    There were no vitals taken for this visit.  Weight: Provided by patient  Height: Provided by patient  BMI: Based on patient-provided information  Blood Pressure: Unable to obtain due to video visit    Mental Health:    In general, how would you rate your overall mental or emotional health? excellent  PHQ-2 Score:      Do you feel safe in your environment? Yes    Have you ever done Advance Care Planning? (For example, a Health Directive, POLST, or a discussion with a medical provider or your loved ones about your wishes)? Yes, advance care planning is on file.    Fall risk:       Cognitive Screening: Unable to complete due to virtual visit; need for additional assessment in future face-to-face visit    Do you have sleep apnea, excessive snoring or daytime drowsiness?: no    Current providers sharing in care for this patient include:   Patient Care Team:  Aaseby-Aguilera, Ramona Ann, PA-C as PCP - General (Family Practice)  Aaseby-Aguilera, Ramona Ann, PA-C as Assigned PCP    Patient has been advised of split billing requirements and indicates understanding: No    Video Start Time: 10:45 AM      Review of Systems   Constitutional, HEENT, cardiovascular, pulmonary, GI, , musculoskeletal, neuro, skin, endocrine and psych systems are negative, except as otherwise noted.      Objective           Vitals:  No vitals were obtained today due to virtual visit.    Physical Exam     GENERAL: Healthy, alert and no distress  EYES: Eyes grossly normal to inspection.  No discharge or erythema, or obvious " "scleral/conjunctival abnormalities.  RESP: No audible wheeze, cough, or visible cyanosis.  No visible retractions or increased work of breathing.    SKIN: Visible skin clear. No significant rash, abnormal pigmentation or lesions.  NEURO: Cranial nerves grossly intact.  Mentation and speech appropriate for age.  PSYCH: Mentation appears normal, affect normal/bright, judgement and insight intact, normal speech and appearance well-groomed.        Assessment & Plan     (Z00.00) Encounter for Medicare annual wellness exam  (primary encounter diagnosis)  Comment:   Plan:     (N39.41) Urge incontinence of urine  Comment: is having trouble with the urge to go and has to find a bathroom asap.  also has to get up at night > 2 times nightly. Does drink soda and caffiene.  Advised cutting this out completley to see if this helps and if not follow-up with urology.   Plan: UROLOGY ADULT REFERRAL            (N18.31) Stage 3a chronic kidney disease  Comment: stable   Plan: Albumin Random Urine Quantitative with Creat         Ratio, levothyroxine (SYNTHROID/LEVOTHROID) 50         MCG tablet, lisinopril (ZESTRIL) 2.5 MG tablet            (E03.8) Other specified hypothyroidism  Comment:   Plan: TSH with free T4 reflex, levothyroxine         (SYNTHROID/LEVOTHROID) 50 MCG tablet            (Z13.0) Screening for blood disease  Comment:   Plan: CBC with platelets            (Z13.1) Screening for diabetes mellitus  Comment:   Plan: Comprehensive metabolic panel            (Z13.220) Screening, lipid  Comment:   Plan: Lipid panel reflex to direct LDL Fasting            (Z12.11) Special screening for malignant neoplasms, colon  Comment:   Plan: GASTROENTEROLOGY ADULT REF PROCEDURE ONLY                 BMI:   Estimated body mass index is 31.07 kg/m  as calculated from the following:    Height as of this encounter: 1.702 m (5' 7\").    Weight as of this encounter: 90 kg (198 lb 6.4 oz).              Return in about 53 weeks (around 10/15/2021) " for Annual Wellness Visit.    Ramona Ann Aaseby-Aguilera, PA-C  [facility]      Video-Visit Details    Type of service:  Video Visit    Video End Time:11:28 AM    Originating Location (pt. Location): Home    Distant Location (provider location):  [facility]     Platform used for Video Visit: CarolynBaravento

## 2020-10-09 NOTE — PATIENT INSTRUCTIONS
Patient Education   Personalized Prevention Plan  You are due for the preventive services outlined below.  Your care team is available to assist you in scheduling these services.  If you have already completed any of these items, please share that information with your care team to update in your medical record.  Health Maintenance Due   Topic Date Due     ANNUAL REVIEW OF HM ORDERS  1945     PHQ-2  01/01/2020     Basic Metabolic Panel  05/02/2020     Cholesterol Lab  05/02/2020     Kidney Microalbumin Urine Test  05/02/2020     FALL RISK ASSESSMENT  05/02/2020     Colorectal Cancer Screening  05/04/2020     Flu Vaccine (1) 09/01/2020     Pneumococcal Vaccine (2 of 2 - PPSV23) 05/02/2020

## 2020-10-22 DIAGNOSIS — Z13.0 SCREENING FOR BLOOD DISEASE: ICD-10-CM

## 2020-10-22 DIAGNOSIS — Z13.1 SCREENING FOR DIABETES MELLITUS: ICD-10-CM

## 2020-10-22 DIAGNOSIS — E03.8 OTHER SPECIFIED HYPOTHYROIDISM: ICD-10-CM

## 2020-10-22 DIAGNOSIS — N18.31 STAGE 3A CHRONIC KIDNEY DISEASE (H): ICD-10-CM

## 2020-10-22 DIAGNOSIS — Z13.220 SCREENING, LIPID: ICD-10-CM

## 2020-10-22 LAB
CREAT UR-MCNC: 80 MG/DL
ERYTHROCYTE [DISTWIDTH] IN BLOOD BY AUTOMATED COUNT: 14.1 % (ref 10–15)
HCT VFR BLD AUTO: 39.3 % (ref 35–47)
HGB BLD-MCNC: 12.6 G/DL (ref 11.7–15.7)
MCH RBC QN AUTO: 29.6 PG (ref 26.5–33)
MCHC RBC AUTO-ENTMCNC: 32.1 G/DL (ref 31.5–36.5)
MCV RBC AUTO: 93 FL (ref 78–100)
MICROALBUMIN UR-MCNC: 7 MG/L
MICROALBUMIN/CREAT UR: 8.57 MG/G CR (ref 0–25)
PLATELET # BLD AUTO: 207 10E9/L (ref 150–450)
RBC # BLD AUTO: 4.25 10E12/L (ref 3.8–5.2)
WBC # BLD AUTO: 4.8 10E9/L (ref 4–11)

## 2020-10-22 PROCEDURE — 85027 COMPLETE CBC AUTOMATED: CPT | Performed by: PHYSICIAN ASSISTANT

## 2020-10-22 PROCEDURE — 82043 UR ALBUMIN QUANTITATIVE: CPT | Performed by: PHYSICIAN ASSISTANT

## 2020-10-22 PROCEDURE — 36415 COLL VENOUS BLD VENIPUNCTURE: CPT | Performed by: PHYSICIAN ASSISTANT

## 2020-10-22 PROCEDURE — 84443 ASSAY THYROID STIM HORMONE: CPT | Performed by: PHYSICIAN ASSISTANT

## 2020-10-22 PROCEDURE — 80053 COMPREHEN METABOLIC PANEL: CPT | Performed by: PHYSICIAN ASSISTANT

## 2020-10-22 PROCEDURE — 80061 LIPID PANEL: CPT | Performed by: PHYSICIAN ASSISTANT

## 2020-10-23 LAB
ALBUMIN SERPL-MCNC: 3.4 G/DL (ref 3.4–5)
ALP SERPL-CCNC: 72 U/L (ref 40–150)
ALT SERPL W P-5'-P-CCNC: 27 U/L (ref 0–50)
ANION GAP SERPL CALCULATED.3IONS-SCNC: 7 MMOL/L (ref 3–14)
AST SERPL W P-5'-P-CCNC: 20 U/L (ref 0–45)
BILIRUB SERPL-MCNC: 0.5 MG/DL (ref 0.2–1.3)
BUN SERPL-MCNC: 36 MG/DL (ref 7–30)
CALCIUM SERPL-MCNC: 8.9 MG/DL (ref 8.5–10.1)
CHLORIDE SERPL-SCNC: 110 MMOL/L (ref 94–109)
CHOLEST SERPL-MCNC: 228 MG/DL
CO2 SERPL-SCNC: 22 MMOL/L (ref 20–32)
CREAT SERPL-MCNC: 1.34 MG/DL (ref 0.52–1.04)
GFR SERPL CREATININE-BSD FRML MDRD: 39 ML/MIN/{1.73_M2}
GLUCOSE SERPL-MCNC: 96 MG/DL (ref 70–99)
HDLC SERPL-MCNC: 89 MG/DL
LDLC SERPL CALC-MCNC: 119 MG/DL
NONHDLC SERPL-MCNC: 139 MG/DL
POTASSIUM SERPL-SCNC: 4.4 MMOL/L (ref 3.4–5.3)
PROT SERPL-MCNC: 6.9 G/DL (ref 6.8–8.8)
SODIUM SERPL-SCNC: 139 MMOL/L (ref 133–144)
TRIGL SERPL-MCNC: 101 MG/DL
TSH SERPL DL<=0.005 MIU/L-ACNC: 2.04 MU/L (ref 0.4–4)

## 2021-01-25 ENCOUNTER — MYC MEDICAL ADVICE (OUTPATIENT)
Dept: FAMILY MEDICINE | Facility: CLINIC | Age: 76
End: 2021-01-25

## 2021-02-09 ENCOUNTER — MYC MEDICAL ADVICE (OUTPATIENT)
Dept: FAMILY MEDICINE | Facility: CLINIC | Age: 76
End: 2021-02-09

## 2021-02-09 DIAGNOSIS — Z12.11 SPECIAL SCREENING FOR MALIGNANT NEOPLASMS, COLON: Primary | ICD-10-CM

## 2021-02-16 LAB — COLOGUARD-ABSTRACT: NEGATIVE

## 2021-04-08 ENCOUNTER — APPOINTMENT (OUTPATIENT)
Dept: CT IMAGING | Facility: CLINIC | Age: 76
DRG: 069 | End: 2021-04-08
Attending: EMERGENCY MEDICINE
Payer: MEDICARE

## 2021-04-08 ENCOUNTER — APPOINTMENT (OUTPATIENT)
Dept: MRI IMAGING | Facility: CLINIC | Age: 76
DRG: 069 | End: 2021-04-08
Attending: INTERNAL MEDICINE
Payer: MEDICARE

## 2021-04-08 ENCOUNTER — HOSPITAL ENCOUNTER (INPATIENT)
Facility: CLINIC | Age: 76
LOS: 1 days | Discharge: HOME | DRG: 069 | End: 2021-04-09
Attending: EMERGENCY MEDICINE | Admitting: STUDENT IN AN ORGANIZED HEALTH CARE EDUCATION/TRAINING PROGRAM
Payer: MEDICARE

## 2021-04-08 ENCOUNTER — NURSE TRIAGE (OUTPATIENT)
Dept: FAMILY MEDICINE | Facility: CLINIC | Age: 76
End: 2021-04-08

## 2021-04-08 DIAGNOSIS — G45.9 TIA (TRANSIENT ISCHEMIC ATTACK): Primary | ICD-10-CM

## 2021-04-08 DIAGNOSIS — I63.9 CEREBROVASCULAR ACCIDENT (CVA), UNSPECIFIED MECHANISM (H): ICD-10-CM

## 2021-04-08 LAB
ALBUMIN SERPL-MCNC: 3.6 G/DL (ref 3.4–5)
ALP SERPL-CCNC: 77 U/L (ref 40–150)
ALT SERPL W P-5'-P-CCNC: 34 U/L (ref 0–50)
ANION GAP SERPL CALCULATED.3IONS-SCNC: 7 MMOL/L (ref 3–14)
AST SERPL W P-5'-P-CCNC: 23 U/L (ref 0–45)
BASOPHILS # BLD AUTO: 0.1 10E9/L (ref 0–0.2)
BASOPHILS NFR BLD AUTO: 0.9 %
BILIRUB SERPL-MCNC: 0.4 MG/DL (ref 0.2–1.3)
BUN SERPL-MCNC: 33 MG/DL (ref 7–30)
CALCIUM SERPL-MCNC: 8.6 MG/DL (ref 8.5–10.1)
CHLORIDE SERPL-SCNC: 112 MMOL/L (ref 94–109)
CO2 SERPL-SCNC: 23 MMOL/L (ref 20–32)
CREAT SERPL-MCNC: 1.49 MG/DL (ref 0.52–1.04)
DIFFERENTIAL METHOD BLD: NORMAL
EOSINOPHIL # BLD AUTO: 0.3 10E9/L (ref 0–0.7)
EOSINOPHIL NFR BLD AUTO: 3.7 %
ERYTHROCYTE [DISTWIDTH] IN BLOOD BY AUTOMATED COUNT: 14.8 % (ref 10–15)
GFR SERPL CREATININE-BSD FRML MDRD: 34 ML/MIN/{1.73_M2}
GLUCOSE BLDC GLUCOMTR-MCNC: 133 MG/DL (ref 70–99)
GLUCOSE BLDC GLUCOMTR-MCNC: 93 MG/DL (ref 70–99)
GLUCOSE SERPL-MCNC: 101 MG/DL (ref 70–99)
HCT VFR BLD AUTO: 42.8 % (ref 35–47)
HGB BLD-MCNC: 13.7 G/DL (ref 11.7–15.7)
IMM GRANULOCYTES # BLD: 0 10E9/L (ref 0–0.4)
IMM GRANULOCYTES NFR BLD: 0.3 %
INTERPRETATION ECG - MUSE: NORMAL
INTERPRETATION ECG - MUSE: NORMAL
LABORATORY COMMENT REPORT: NORMAL
LYMPHOCYTES # BLD AUTO: 1.9 10E9/L (ref 0.8–5.3)
LYMPHOCYTES NFR BLD AUTO: 24.6 %
MCH RBC QN AUTO: 29.4 PG (ref 26.5–33)
MCHC RBC AUTO-ENTMCNC: 32 G/DL (ref 31.5–36.5)
MCV RBC AUTO: 92 FL (ref 78–100)
MONOCYTES # BLD AUTO: 0.7 10E9/L (ref 0–1.3)
MONOCYTES NFR BLD AUTO: 9.5 %
NEUTROPHILS # BLD AUTO: 4.8 10E9/L (ref 1.6–8.3)
NEUTROPHILS NFR BLD AUTO: 61 %
NRBC # BLD AUTO: 0 10*3/UL
NRBC BLD AUTO-RTO: 0 /100
PLATELET # BLD AUTO: 258 10E9/L (ref 150–450)
POTASSIUM SERPL-SCNC: 4.4 MMOL/L (ref 3.4–5.3)
PROT SERPL-MCNC: 7.4 G/DL (ref 6.8–8.8)
RBC # BLD AUTO: 4.66 10E12/L (ref 3.8–5.2)
SARS-COV-2 RNA RESP QL NAA+PROBE: NEGATIVE
SODIUM SERPL-SCNC: 142 MMOL/L (ref 133–144)
SPECIMEN SOURCE: NORMAL
TROPONIN I SERPL-MCNC: <0.015 UG/L (ref 0–0.04)
WBC # BLD AUTO: 7.8 10E9/L (ref 4–11)

## 2021-04-08 PROCEDURE — 255N000002 HC RX 255 OP 636: Performed by: EMERGENCY MEDICINE

## 2021-04-08 PROCEDURE — 250N000011 HC RX IP 250 OP 636: Performed by: EMERGENCY MEDICINE

## 2021-04-08 PROCEDURE — 99223 1ST HOSP IP/OBS HIGH 75: CPT | Mod: AI | Performed by: STUDENT IN AN ORGANIZED HEALTH CARE EDUCATION/TRAINING PROGRAM

## 2021-04-08 PROCEDURE — 250N000013 HC RX MED GY IP 250 OP 250 PS 637: Performed by: STUDENT IN AN ORGANIZED HEALTH CARE EDUCATION/TRAINING PROGRAM

## 2021-04-08 PROCEDURE — U0005 INFEC AGEN DETEC AMPLI PROBE: HCPCS | Performed by: EMERGENCY MEDICINE

## 2021-04-08 PROCEDURE — 93005 ELECTROCARDIOGRAM TRACING: CPT | Mod: 76,XU

## 2021-04-08 PROCEDURE — U0003 INFECTIOUS AGENT DETECTION BY NUCLEIC ACID (DNA OR RNA); SEVERE ACUTE RESPIRATORY SYNDROME CORONAVIRUS 2 (SARS-COV-2) (CORONAVIRUS DISEASE [COVID-19]), AMPLIFIED PROBE TECHNIQUE, MAKING USE OF HIGH THROUGHPUT TECHNOLOGIES AS DESCRIBED BY CMS-2020-01-R: HCPCS | Performed by: EMERGENCY MEDICINE

## 2021-04-08 PROCEDURE — 70450 CT HEAD/BRAIN W/O DYE: CPT | Mod: MG,XU

## 2021-04-08 PROCEDURE — 85025 COMPLETE CBC W/AUTO DIFF WBC: CPT | Performed by: EMERGENCY MEDICINE

## 2021-04-08 PROCEDURE — 84484 ASSAY OF TROPONIN QUANT: CPT | Performed by: EMERGENCY MEDICINE

## 2021-04-08 PROCEDURE — 80053 COMPREHEN METABOLIC PANEL: CPT | Performed by: EMERGENCY MEDICINE

## 2021-04-08 PROCEDURE — 250N000009 HC RX 250: Performed by: EMERGENCY MEDICINE

## 2021-04-08 PROCEDURE — 93005 ELECTROCARDIOGRAM TRACING: CPT

## 2021-04-08 PROCEDURE — A9585 GADOBUTROL INJECTION: HCPCS | Performed by: EMERGENCY MEDICINE

## 2021-04-08 PROCEDURE — C9803 HOPD COVID-19 SPEC COLLECT: HCPCS

## 2021-04-08 PROCEDURE — 70553 MRI BRAIN STEM W/O & W/DYE: CPT

## 2021-04-08 PROCEDURE — 250N000013 HC RX MED GY IP 250 OP 250 PS 637

## 2021-04-08 PROCEDURE — 999N001017 HC STATISTIC GLUCOSE BY METER IP

## 2021-04-08 PROCEDURE — 99207 PR NO CHARGE LOS: CPT | Performed by: INTERNAL MEDICINE

## 2021-04-08 PROCEDURE — 120N000001 HC R&B MED SURG/OB

## 2021-04-08 PROCEDURE — 99285 EMERGENCY DEPT VISIT HI MDM: CPT | Mod: 25

## 2021-04-08 PROCEDURE — 70498 CT ANGIOGRAPHY NECK: CPT | Mod: MG

## 2021-04-08 RX ORDER — CLOPIDOGREL BISULFATE 75 MG/1
75 TABLET ORAL DAILY
Status: DISCONTINUED | OUTPATIENT
Start: 2021-04-09 | End: 2021-04-09

## 2021-04-08 RX ORDER — GADOBUTROL 604.72 MG/ML
10 INJECTION INTRAVENOUS ONCE
Status: COMPLETED | OUTPATIENT
Start: 2021-04-08 | End: 2021-04-08

## 2021-04-08 RX ORDER — LIDOCAINE 40 MG/G
CREAM TOPICAL
Status: DISCONTINUED | OUTPATIENT
Start: 2021-04-08 | End: 2021-04-09 | Stop reason: HOSPADM

## 2021-04-08 RX ORDER — ATORVASTATIN CALCIUM 40 MG/1
40 TABLET, FILM COATED ORAL EVERY EVENING
Status: DISCONTINUED | OUTPATIENT
Start: 2021-04-08 | End: 2021-04-09 | Stop reason: HOSPADM

## 2021-04-08 RX ORDER — ASPIRIN 81 MG/1
81 TABLET, CHEWABLE ORAL DAILY
Status: DISCONTINUED | OUTPATIENT
Start: 2021-04-08 | End: 2021-04-08

## 2021-04-08 RX ORDER — CLOPIDOGREL BISULFATE 75 MG/1
75 TABLET ORAL DAILY
Status: DISCONTINUED | OUTPATIENT
Start: 2021-04-09 | End: 2021-04-08

## 2021-04-08 RX ORDER — ASPIRIN 81 MG/1
81 TABLET ORAL DAILY
Status: DISCONTINUED | OUTPATIENT
Start: 2021-04-08 | End: 2021-04-09 | Stop reason: HOSPADM

## 2021-04-08 RX ORDER — ATORVASTATIN CALCIUM 40 MG/1
40 TABLET, FILM COATED ORAL EVERY EVENING
Status: DISCONTINUED | OUTPATIENT
Start: 2021-04-08 | End: 2021-04-08

## 2021-04-08 RX ORDER — ASPIRIN 325 MG
325 TABLET ORAL ONCE
Status: COMPLETED | OUTPATIENT
Start: 2021-04-08 | End: 2021-04-08

## 2021-04-08 RX ORDER — CLOPIDOGREL 300 MG/1
300 TABLET, FILM COATED ORAL ONCE
Status: COMPLETED | OUTPATIENT
Start: 2021-04-08 | End: 2021-04-08

## 2021-04-08 RX ORDER — LEVOTHYROXINE SODIUM 50 UG/1
50 TABLET ORAL
Status: DISCONTINUED | OUTPATIENT
Start: 2021-04-09 | End: 2021-04-09 | Stop reason: HOSPADM

## 2021-04-08 RX ORDER — IOPAMIDOL 755 MG/ML
120 INJECTION, SOLUTION INTRAVASCULAR ONCE
Status: COMPLETED | OUTPATIENT
Start: 2021-04-08 | End: 2021-04-08

## 2021-04-08 RX ADMIN — ASPIRIN 325 MG ORAL TABLET 325 MG: 325 PILL ORAL at 15:56

## 2021-04-08 RX ADMIN — GADOBUTROL 10 ML: 604.72 INJECTION INTRAVENOUS at 17:34

## 2021-04-08 RX ADMIN — SODIUM CHLORIDE 100 ML: 9 INJECTION, SOLUTION INTRAVENOUS at 15:40

## 2021-04-08 RX ADMIN — CLOPIDOGREL BISULFATE 300 MG: 300 TABLET, FILM COATED ORAL at 15:56

## 2021-04-08 RX ADMIN — IOPAMIDOL 70 ML: 755 INJECTION, SOLUTION INTRAVENOUS at 15:39

## 2021-04-08 RX ADMIN — ATORVASTATIN CALCIUM 40 MG: 40 TABLET, FILM COATED ORAL at 20:25

## 2021-04-08 ASSESSMENT — ACTIVITIES OF DAILY LIVING (ADL): ADLS_ACUITY_SCORE: 13

## 2021-04-08 ASSESSMENT — ENCOUNTER SYMPTOMS
SPEECH DIFFICULTY: 1
FATIGUE: 1
PALPITATIONS: 1

## 2021-04-08 NOTE — TELEPHONE ENCOUNTER
"Both legs and right arm felt heavy 15 min and daughter reports slurred speech  Advised pt go to the ER  Pt and her daughter verbalized understanding and agrees to the plan    Thank you  Amanda Bolden RN on 4/8/2021 at 1:10 PM    Additional Information    Neurologic deficit that was brief (now gone), ANY of the following: * Weakness of the face, arm, or leg on one side of the body * Numbness of the face, arm, or leg on one side of the body * Loss of speech or garbled speech    Answer Assessment - Initial Assessment Questions  1. SYMPTOM: \"What is the main symptom you are concerned about?\" (e.g., weakness, numbness)      Heaviness in both legs and right arm. Daughter reports slurred speech  2. ONSET: \"When did this start?\" (minutes, hours, days; while sleeping)      Happened about 30 min ago. Lasted  15 mmin  3. LAST NORMAL: \"When was the last time you were normal (no symptoms)?\"      Back to normal now  4. PATTERN \"Does this come and go, or has it been constant since it started?\"  \"Is it present now?\"      no  5. CARDIAC SYMPTOMS: \"Have you had any of the following symptoms: chest pain, difficulty breathing, palpitations?\"      Yesterday felt like her heart was racing  6. NEUROLOGIC SYMPTOMS: \"Have you had any of the following symptoms: headache, dizziness, vision loss, double vision, changes in speech, unsteady on your feet?\"      Slurred speech and dizzy per daughter  7. OTHER SYMPTOMS: \"Do you have any other symptoms?\"      no  8. PREGNANCY: \"Is there any chance you are pregnant?\" \"When was your last menstrual period?\"      NA    Protocols used: NEUROLOGIC DEFICIT-A-OH      "

## 2021-04-08 NOTE — PHARMACY-ADMISSION MEDICATION HISTORY
Pharmacy Medication History  Admission medication history interview status for the 4/8/2021  admission is complete. See EPIC admission navigator for prior to admission medications     Location of Interview: Patient room  Medication history sources: Patient's family/friend (daughter anton )    Significant changes made to the medication list:      In the past week, patient estimated taking medication this percent of the time: greater than 90%    Additional medication history information:       Medication reconciliation completed by provider prior to medication history? No    Time spent in this activity: 15min     Prior to Admission medications    Medication Sig Last Dose Taking? Auth Provider   levothyroxine (SYNTHROID/LEVOTHROID) 50 MCG tablet Take 1 tablet (50 mcg) by mouth daily Take one by mouth daily 4/8/2021 at Unknown time Yes Aaseby-Aguilera, Ramona Ann, PA-C   lisinopril (ZESTRIL) 2.5 MG tablet Take 1 tablet (2.5 mg) by mouth daily 4/8/2021 at Unknown time Yes Aaseby-Aguilera, Ramona Ann, PA-C   oxybutynin (OXYTROL) 3.9 MG/24HR BIW patch Place 1 patch onto the skin twice a week  Patient taking differently: Place 1 patch onto the skin twice a week Next due 4/9   Aaseby-Aguilera, Ramona Ann, PA-C       The information provided in this note is only as accurate as the sources available at the time of update(s)   Jane MaeD

## 2021-04-08 NOTE — CONSULTS
"[facility]    Stroke Consult Note    Reason for Consult: Stroke Code    Chief Complaint: Slurred Speech      HPI  Tonja Nieves is a 76 year old female who presented with transient and stuttering right facial droop and slurred speech first noticed by her daughter at 1200. Her LSN was 1100. On arrival on the ED, she had returned completely back to baseline with no deficits and NIHSS 0.CT was negative for any acute processes, but CTA revealed a probable m3 occlusion.       Thrombolytic Treatment   Not given due to minor/isolated/quickly resolving symptoms and unclear or unfavorable risk-benefit profile for extended window thrombolysis beyond the conventional 4.5 hour time window.    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 We will continue to follow.     The Stroke Staff is Molly Rodríguez MD.    Werner Rodriges MD  Vascular Neurology Fellow  To page me or covering stroke neurology team member, click here: [facility]COM   Choose \"On Call\" tab at top, then search dropdown box for \"Neurology Adult\", select location, press Enter, then look for stroke/neuro ICU/telestroke.    ______________________________________________________    Past Medical History   Past Medical History:   Diagnosis Date     Chronic kidney disease, stage 3, mod decreased GFR      " Hypothyroid      Urge incontinence      Past Surgical History   Past Surgical History:   Procedure Laterality Date     bilateral hip replacement  2006     COLONOSCOPY       GASTRIC BYPASS  2005    [id number]     TONSILLECTOMY      age 9     Medications   Home Meds  Prior to Admission medications    Medication Sig Start Date End Date Taking? Authorizing Provider   levothyroxine (SYNTHROID/LEVOTHROID) 50 MCG tablet Take 1 tablet (50 mcg) by mouth daily Take one by mouth daily 10/9/20   Aaseby-Aguilera, Ramona Ann, PA-C   lisinopril (ZESTRIL) 2.5 MG tablet Take 1 tablet (2.5 mg) by mouth daily 10/9/20   Aaseby-Aguilera, Ramona Ann, PA-C   oxybutynin (OXYTROL) 3.9 MG/24HR BIW patch Place 1 patch onto the skin twice a week 5/1/17   Aaseby-Aguilera, Ramona Ann, PA-C       Scheduled Meds    aspirin  325 mg Oral Once     atorvastatin  40 mg Oral QPM     clopidogrel  300 mg Oral Once       Infusion Meds      PRN Meds      Allergies   No Known Allergies  Family History   Family History   Problem Relation Age of Onset     Alzheimer Disease Mother      Hypertension Mother      Diabetes Father      Heart Disease Father      Genetic Disorder Brother 55        ms     Thyroid Disease Sister 60        thyroid      Thyroid Disease Daughter      Diabetes Paternal Grandmother      Social History   Social History     Tobacco Use     Smoking status: Never Smoker     Smokeless tobacco: Never Used   Substance Use Topics     Alcohol use: No     Alcohol/week: 0.0 standard drinks     Drug use: No       Review of Systems   The 10 point Review of Systems is negative other than noted in the HPI or here.        PHYSICAL EXAMINATION  Temp:  [97.7  F (36.5  C)] 97.7  F (36.5  C)  Pulse:  [50] 50  Resp:  [13] 13  BP: (146)/(76) 146/76  SpO2:  [97 %] 97 %     Neurologic  Mental Status:  alert, oriented x 3, follows commands, speech clear and fluent, naming and repetition normal  Cranial Nerves:  visual fields intact, PERRL, EOMI with normal smooth  pursuit, facial sensation intact and symmetric, facial movements symmetric, hearing not formally tested but intact to conversation, palate elevation symmetric and uvula midline, no dysarthria, shoulder shrug strong bilaterally, tongue protrusion midline  Motor:  normal muscle tone and bulk, no abnormal movements, able to move all limbs spontaneously, strength 5/5 throughout upper and lower extremities, no pronator drift  Reflexes:  toes down-going  Sensory:  light touch sensation intact and symmetric throughout upper and lower extremities, no extinction on double simultaneous stimulation   Coordination:  normal finger-to-nose and heel-to-shin bilaterally without dysmetria, rapid alternating movements symmetric  Station/Gait:  deferred      Dysphagia Screen  Per Nursing    Stroke Scales    NIHSS  Interval baseline (04/08/21 1554)   Interval Comments     1a. Level of Consciousness 0-->Alert, keenly responsive   1b. LOC Questions 0-->Answers both questions correctly   1c. LOC Commands 0-->Performs both tasks correctly   2.   Best Gaze 0-->Normal   3.   Visual 0-->No visual loss   4.   Facial Palsy 0-->Normal symmetrical movements   5a. Motor Arm, Left 0-->No drift, limb holds 90 (or 45) degrees for full 10 secs   5b. Motor Arm, Right 0-->No drift, limb holds 90 (or 45) degrees for full 10 secs   6a. Motor Leg, Left 0-->No drift, leg holds 30 degree position for full 5 secs   6b. Motor Leg, right 0-->No drift, leg holds 30 degree position for full 5 secs   7.   Limb Ataxia 0-->Absent   8.   Sensory 0-->Normal, no sensory loss   9.   Best Language 0-->No aphasia, normal   10. Dysarthria 0-->Normal   11. Extinction and Inattention  0-->No abnormality   Total 0 (04/08/21 1554)       Imaging  I personally reviewed all imaging; relevant findings per HPI.     Lab Results Data   CBC  Recent Labs   Lab 04/08/21  1415   WBC 7.8   RBC 4.66   HGB 13.7   HCT 42.8        Basic Metabolic Panel    No results for input(s): NA,  POTASSIUM, CHLORIDE, CO2, BUN, CR, GLC, INO in the last 168 hours.  Liver Panel  No results for input(s): PROTTOTAL, ALBUMIN, BILITOTAL, ALKPHOS, AST, ALT, BILIDIRECT in the last 168 hours.  INR  No lab results found.   Lipid Profile    Recent Labs   Lab Test 10/22/20  0944 05/02/19  1031 04/30/18  1053 04/13/15  1114 04/13/15  1114 04/10/14  1102 04/08/13  0944   CHOL 228* 239* 236*   < > 209* 192 209*   HDL 89 93 82   < > 92 74 99   * 126* 135*   < > 100 98 90   TRIG 101 99 93   < > 87 95 101   CHOLHDLRATIO  --   --   --   --  2.3 2.6 2.1    < > = values in this interval not displayed.     A1C  No lab results found.  Troponin I  No results for input(s): TROPI in the last 168 hours.       Stroke Code / Stroke Consult Data Data   Stroke Code Data  (for stroke code without tele)  Stroke code activated 04/08/21   1519   First stroke provider response 04/08/21   1519   Last known normal 04/08/21   1100   Time of discovery   (or onset of symptoms) 04/08/21   1200   Head CT read by Stroke Neuro Dr/Provider 04/08/21   1550   Was stroke code de-escalated? Yes 04/08/21 1550  other (see comments) back to baseline

## 2021-04-08 NOTE — ED PROVIDER NOTES
History   Chief Complaint:  Slurred Speech     HPI   Tonja Nieves is a 76 year old right-handed female with history of hypothyroidism who presents with slurred speech. The patient reports some intermittent palpitations yesterday. Her daughter then states she called the patient at 1150 and thought the patient was slurring her words at this time. The patient then called her daughter at 1217 and reported she was not feeling well. Later while she was grocery shopping around 1315 she noted some heaviness in both legs and her right arm. She ate and felt better afterwards. The patient additionally notes some fatigue throughout all of this today. She denies a history of Afib. Denies the use of any Aspirin today.     Review of Systems   Constitutional: Positive for fatigue.   Cardiovascular: Positive for palpitations.   Musculoskeletal:        Heaviness in arm and legs   Neurological: Positive for speech difficulty (none currently ).   All other systems reviewed and are negative.      Allergies:  The patient has no known allergies.     Medications:  Synthroid  Zestril  Oxybutynin     Past Medical History:    CKD stage 3  Hypothyroid     Past Surgical History:    Bilateral hip replacement  Colonoscopy  Gastric bypass  Tonsillectomy    Parotid gland surgery     Family History:    Alzheimer's  Hypertension  DM  Heart disease    Social History:  Patient presents with daughter at bedside.   PCP: Dr. Aaseby-Aguilera    Physical Exam     Patient Vitals for the past 24 hrs:   BP Temp Temp src Pulse Resp SpO2 Weight   04/08/21 1520 -- -- -- -- -- 97 % 90.7 kg (200 lb)   04/08/21 1415 (!) 146/76 -- -- -- -- 97 % --   04/08/21 1409 -- 97.7  F (36.5  C) Temporal 50 13 -- --       Physical Exam  General: Resting comfortably on the gurney  Head:  The scalp, face, and head appear normal  Eyes:  The pupils are equal, round, and reactive to light    There is no nystagmus    Extraocular muscles are intact    Conjunctivae and sclerae are  normal  ENT:    The nose is normal    Pinnae are normal    The oropharynx is normal    Uvula is in the midline  Neck:  Normal range of motion    There is no rigidity noted    There is no midline cervical spine pain/tenderness    Trachea is in the midline    No carotid bruits.    No mass is detected  CV:  Regular rate and underlying rhythm, frequent PVCs.     Normal S1/S2, no S3/S4    No pathological murmur detected  Resp:  Lungs are clear    There is no tachypnea    Non-labored    No rales    No wheezing   GI:  Abdomen is soft, there is no rigidity    No distension    No tympani    No rebound tenderness     Non-surgical without peritoneal features  MS:  Normal muscular tone    Symmetric motor strength    No major joint effusions    No asymmetric leg swelling, no calf tenderness  Skin:  No rash or acute skin lesions noted  Neuro: Speech is normal and fluent cranial nerves II-XII intact    Refer to NIH stroke scale below.   Psych:  Awake. Alert.      Normal affect.  Appropriate interactions.  Lymph: No anterior cervical lymphadenopathy noted    National Institutes of Health Stroke Scale  Exam Interval: 1453   Score    Level of consciousness: (0)   Alert, keenly responsive    LOC questions: (0)   Answers both questions correctly    LOC commands: (0)   Performs both tasks correctly    Best gaze: (0)   Normal    Visual: (0)   No visual loss    Facial palsy: (0)   Normal symmetrical movements    Motor arm (left): (0)   No drift    Motor arm (right): (0)   No drift    Motor leg (left): (0)   No drift    Motor leg (right): (0)   No drift    Limb ataxia: (0)   Absent    Sensory: (0)   Normal- no sensory loss    Best language: (0)   Normal- no aphasia    Dysarthria: (0)   Normal    Extinction and inattention: (0)   No abnormality        Total Score:  0     Emergency Department Course     ECG #1  ECG taken at 1421, ECG read at 1614  Sinus rhythm with occasional PVCs   Rate 65 bpm. CA interval 162 ms. QRS duration 86 ms. QT/QTc  442/459 ms. P-R-T axes 36 43 39.     ECG #2  ECG taken at 1547, ECG read at 1553  Sinus rhythm with frequent PVCs   Rate 62 bpm. ID interval 166 ms. QRS duration 86 ms. QT/QTc 478/485 ms. P-R-T axes 70 62 64.     Imaging:    CTA Head Neck w/ IV contrast:   1. Probable thromboembolic occlusion of an inferior division branch of   the left middle cerebral artery at the M2 M3 junction.   2. Otherwise, normal neck and head CTA, as per radiology.    CT Head w/o IV contrast:   Diffuse cerebral volume loss and cerebral white matter   changes consistent with chronic small-vessel ischemic disease. No   evidence for acute intracranial pathology, as per radiology.    MR Brain w/o contrast:  Pending    Laboratory:    CBC: WBC: 7.8, HGB: 13.7, PLT: 258  CMP: Glucose 101 (H), Chloride: 112 (H), Urea Nitrogen: 33 (H), GFR: 34 (L), o/w WNL (Creatinine: 1.49 (H))    Troponin (Collected 1415): <0.015    Asymptomatic COVID PCR: Pending    Emergency Department Course:    Reviewed:  I reviewed the patient's nursing notes, vitals, past medical records.     Assessments:  1453    I evaluated the patient and performed an exam as above.    1518 Tier 1 code stroke called.     1650    I updated the patient on results and discussed plan of care.    Consults:   1515    I spoke with Dr. Rodriges of the Stroke Neuro service regarding patient's presentation, findings, and plan of care.    1600 I spoke with Dr. Mayers of the Radiology service regarding patient's CT findings.    1602 I spoke with Dr. Rodriges of the Stroke Neuro service regarding patient's CT findings. Will de-escalate and not give TPA due to improvement of symptoms.     1642 I spoke with Dr. Douglas of the Hospitalist service regarding patient's presentation, findings, and plan of care.    Interventions:  1556 Aspirin, 325 mg, Oral    Plavix, 300 mg, Oral     Disposition:  The patient was admitted to the hospital under the care of Dr. Douglas.     Impression & Plan     CMS Diagnoses: The patient  has stroke symptoms:         ED Stroke specific documentation           NIHSS PDF     Patient last known well time: 1150  ED Provider first to bedside at: 1453  CT Results received at: 1600    Thrombolytics:   Not given due to minor/isolated/quickly resolving symptoms.    If treating with thrombolytics: Ensure SBP<180 and DBP<105 prior to treatment with thrombolytics.  Administering thrombolytics after treatment with IV labetalol, hydralazine, or nicardipine is reasonable once BP control is established.    Endovascular Retrieval:  Not initiated due to absence of proximal vessel occlusion    National Institutes of Health Stroke Scale (Baseline)  Time Performed: 1453     Score    Level of consciousness: (0)   Alert, keenly responsive    LOC questions: (0)   Answers both questions correctly    LOC commands: (0)   Performs both tasks correctly    Best gaze: (0)   Normal    Visual: (0)   No visual loss    Facial palsy: (0)   Normal symmetrical movements    Motor arm (left): (0)   No drift    Motor arm (right): (0)   No drift    Motor leg (left): (0)   No drift    Motor leg (right): (0)   No drift    Limb ataxia: (0)   Absent    Sensory: (0)   Normal- no sensory loss    Best language: (0)   Normal- no aphasia    Dysarthria: (0)   Normal    Extinction and inattention: (0)   No abnormality        Total Score:  0        Stroke Mimics were considered (including migraine headache, seizure disorder, hypoglycemia (or hyperglycemia), head or spinal trauma, CNS infection, Toxin ingestion and shock state (e.g. sepsis) .    Medical Decision Making:  Patient presents with symptoms of slurred speech, right arm and right leg weakness, and some left leg weakness.  She had palpitations yesterday.  The patient symptoms had almost resolved completely at the time of my involvement, however she was still within the technical window for thrombolytic therapy.  An acute code stroke activation was performed and the patient underwent CT/CTA and  was found to have a left middle cerebral artery M1/M2 branch occlusion/clot.  She was seen in consultation with stroke neurology.  Her NIH stroke scale was 1 at the most.  We opted to de-escalate and not proceed with thrombolytic therapy given that the patient was 4 hours and 15 minutes out with a markedly improved exam and was feeling fine.  We did discuss the risks and benefits.  Both stroke neurology, myself, the patient, and the daughter agreed that we would not pursue thrombolytic therapy.  Intra-arterial intervention is not indicated at this juncture.  The patient was given aspirin and Plavix.  MRI has been ordered and is pending.  The patient will be admitted to the hospitalist with stroke neurology consulting to the neurology service.    Critical Care Time: was 40 minutes for this patient excluding procedures    Covid-19  Tonja Nieves was evaluated during a global COVID-19 pandemic, which necessitated consideration that the patient might be at risk for infection with the SARS-CoV-2 virus that causes COVID-19.   Applicable protocols for evaluation were followed during the patient's care.   COVID-19 was considered as part of the patient's evaluation. The plan for testing is:  a test was obtained during this visit.    Diagnosis:    ICD-10-CM    1. Cerebrovascular accident (CVA), unspecified mechanism (H)  I63.9 Asymptomatic SARS-CoV-2 COVID-19 Virus (Coronavirus) by PCR     Scribe Disclosure:  Betty BLANCO, am serving as a scribe at 2:43 PM on 4/8/2021 to document services personally performed by Nirav Slater MD based on my observations and the provider's statements to me.      Nirav Slater MD  04/08/21 3570

## 2021-04-08 NOTE — PROGRESS NOTES
RECEIVING UNIT ED HANDOFF REVIEW    ED Nurse Handoff Report was reviewed by: Ann Steinberg RN on April 8, 2021 at 5:54 PM

## 2021-04-08 NOTE — H&P
[facility]    History and Physical - Hospitalist Service       Date of Admission:  4/8/2021    Assessment & Plan   Tonja Nieves is a 76 year old female with past medical history significant for hypothyroidism, and CKD III admitted on 4/8/2021 with TIA.      Transient ischemic Attack   M3 occlusion   Presented to the ED with some slurred speech and heaviness in both legs and her right arm. By the time she presented to the ED her symptoms were entirely resolved. She now feels completely back to normal. 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 MRI was obtained which showed diffuse cerebral volume loss and white matter changes consistent with chronic small vessel ischemic disease, no acute intracranial pathology.   - neurology consulted   - Neurochecks and Vital Signs every 4  - Daily aspirin 81 mg for secondary stroke prevention  - Plavix (clopidogrel) 300 mg PO loading dose x 1  - Plavix (clopidogrel) 75 mg PO Daily  - Statin: Lipitor 40  - TTE   - A1c, Lipid Panel  - She will need a 30 day event monitor     Hypothyroidism   Continue PTA levothyroxine     CKD III  Creatinine is 1.49. Baseline creatinine appears to be 1.2-1.3.   - Continue to monitor   - Hold PTA lisinopril      Diet: NPO for Medical/Clinical Reasons Except for: No Exceptions  Combination Diet    DVT Prophylaxis: Pneumatic Compression Devices  Segovia Catheter: not present  Code Status: Full Code           Disposition Plan   Expected discharge: 2 - 3 days, recommended to prior living arrangement once stroke work-up completed.  Entered: Marichuy Douglas MD 04/08/2021, 6:59 PM     The patient's care was discussed with the Patient.    Marichuy Douglas MD  [facility]  Contact information available via [facility]  [place]/Directory      ______________________________________________________________________    Chief Complaint   Slurred speech, weakness     History is obtained from the patient    History of Present Illness   Tonja Nieves is a 76 year old female who presented to the ED today with slurred speech. The patient first noted that she was slurring her words this morning around 1150 when she called her daughter on the phone. Later, around 1pm she noted heaviness in both legs and her right arm. She also has felt more fatigued than normal and had some palpitations yesterday that seemed out of the ordinary.     IN the ED, she had normal vital signs. EKG showed sinus rhythm with frequent PVCs. By the time she was evaluated in the ED, almost all of her symptoms at completely resolve. She was however still within the window for TPA so stroke code was activated. She underwent CT/CTA and was found to have a left middle cerebral artery occlusion. Upon neurology's evaluation her NIH stroke scale was 1 so stroke code was de-escalated and thrombolytics were not given. She was given asa and plavix and admitted for further stroke evaluation.     Pt is seen on the floor and reports no residual symptoms. She has no current complaints.     Review of Systems    The 10 point Review of Systems is negative other than noted in the HPI or here.     Past Medical History    I have reviewed this patient's medical history and updated it with pertinent information if needed.   Past Medical History:   Diagnosis Date     Chronic kidney disease, stage 3, mod decreased GFR      Hypothyroid      Urge incontinence        Past Surgical History   I have reviewed this patient's surgical history and updated it with pertinent information if needed.  Past Surgical History:   Procedure Laterality Date     bilateral hip replacement  2006     COLONOSCOPY       GASTRIC BYPASS  2005    [id number]     TONSILLECTOMY      age 9       Social History   I have reviewed  this patient's social history and updated it with pertinent information if needed.  Social History     Tobacco Use     Smoking status: Never Smoker     Smokeless tobacco: Never Used   Substance Use Topics     Alcohol use: No     Alcohol/week: 0.0 standard drinks     Drug use: No       Family History   I have reviewed this patient's family history and updated it with pertinent information if needed.  Family History   Problem Relation Age of Onset     Alzheimer Disease Mother      Hypertension Mother      Diabetes Father      Heart Disease Father      Genetic Disorder Brother 55        ms     Thyroid Disease Sister 60        thyroid      Thyroid Disease Daughter      Diabetes Paternal Grandmother        Prior to Admission Medications   Prior to Admission Medications   Prescriptions Last Dose Informant Patient Reported? Taking?   levothyroxine (SYNTHROID/LEVOTHROID) 50 MCG tablet 4/8/2021 at Unknown time Daughter No Yes   Sig: Take 1 tablet (50 mcg) by mouth daily Take one by mouth daily   lisinopril (ZESTRIL) 2.5 MG tablet 4/8/2021 at Unknown time Daughter No Yes   Sig: Take 1 tablet (2.5 mg) by mouth daily   oxybutynin (OXYTROL) 3.9 MG/24HR BIW patch  Daughter No No   Sig: Place 1 patch onto the skin twice a week   Patient taking differently: Place 1 patch onto the skin twice a week Next due 4/9      Facility-Administered Medications: None     Allergies   No Known Allergies    Physical Exam   Vital Signs: Temp: 98  F (36.7  C) Temp src: Oral BP: (!) 141/85 Pulse: 56   Resp: 18 SpO2: 98 % O2 Device: None (Room air)    Weight: 200 lbs 0 oz    Constitutional: Awake, alert, cooperative, no apparent distress.  Eyes: Conjunctiva and pupils examined and normal.  HEENT: Moist mucous membranes, normal dentition.  Respiratory: Clear to auscultation bilaterally, no crackles or wheezing.  Cardiovascular: Regular rate and rhythm, occasional PVCs noted, normal S1 and S2, and no murmur noted.  Skin: No rashes, no cyanosis, no  edema.  Musculoskeletal: No joint swelling, erythema or tenderness.  Neurologic: Cranial nerves 2-12 intact, normal strength and sensation.  Psychiatric: Alert, oriented to person, place and time, no obvious anxiety or depression.      Data   Data reviewed today: I reviewed all medications, new labs and imaging results over the last 24 hours.     Recent Labs   Lab 04/08/21  1415   WBC 7.8   HGB 13.7   MCV 92         POTASSIUM 4.4   CHLORIDE 112*   CO2 23   BUN 33*   CR 1.49*   ANIONGAP 7   INO 8.6   *   ALBUMIN 3.6   PROTTOTAL 7.4   BILITOTAL 0.4   ALKPHOS 77   ALT 34   AST 23   TROPI <0.015     Recent Results (from the past 24 hour(s))   CT Head w/o Contrast    Narrative    CT OF THE HEAD WITHOUT CONTRAST 4/8/2021 3:33 PM     COMPARISON: None    HISTORY: Slurred speech. Right facial droop.    TECHNIQUE: 5 mm thick axial CT images of the head were acquired  without IV contrast material.    FINDINGS: There is mild diffuse cerebral volume loss. There are subtle  patchy areas of decreased density in the cerebral white matter  bilaterally that are consistent with sequela of chronic small-vessel  ischemic disease.    The ventricles and basal cisterns are within normal limits in  configuration given the degree of cerebral volume loss.  There is no  midline shift. There are no extra-axial fluid collections.    No intracranial hemorrhage, mass or recent infarct.    The visualized paranasal sinuses are well-aerated. There is no  mastoiditis. There are no fractures of the visualized bones.       Impression    IMPRESSION:  Diffuse cerebral volume loss and cerebral white matter  changes consistent with chronic small-vessel ischemic disease. No  evidence for acute intracranial pathology.      Radiation dose for this scan was reduced using automated exposure  control, adjustment of the mA and/or kV according to patient size, or  iterative reconstruction technique.    SAHRA CALIX MD   CTA Head Neck with  Contrast    Narrative    CT ANGIOGRAM OF THE HEAD AND NECK WITHOUT AND WITH CONTRAST  4/8/2021  3:40 PM     COMPARISON: None    HISTORY: Slurred speech. Right facial droop.    TECHNIQUE:  Precontrast localizing scans were followed by CT  angiography with an injection of 70 mL [id number]-370 nonionic intravenous  contrast material with scans through the head and neck.  Images were  transferred to a separate 3-D workstation where multiplanar  reformations and 3-D images were created.  Estimates of carotid  stenoses are made relative to the distal internal carotid artery  diameters except as noted.      FINDINGS:   Neck CTA: There is a normal variant aberrant right subclavian artery  passing posterior to the esophagus. The bilateral common carotid,  bilateral cervical internal carotid and bilateral vertebral arteries  are patent without stenosis.     Head CTA: There is probable occlusion of an inferior division branch  of the left middle cerebral artery at the M2 M3 junction in the  posterior aspect of the left sylvian fissure (series 9, image 453). No  other occluded vessels identified. The left middle cerebral artery and  its branches are otherwise within normal limits. The basilar,  bilateral intracranial distal internal carotid, bilateral anterior  cerebral, right middle cerebral and bilateral posterior cerebral  arteries are patent and unremarkable. The anterior communicating and  bilateral posterior communicating arteries are patent.      Impression    IMPRESSION:  1. 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  2. Otherwise, normal neck and head CTA.    This imaging study was discussed with the ordering physician, Dr. GREGORIO MARTINEZ, by Dr. Mayers on 4/8/2021 4:00 PM.    Radiation dose for this scan was reduced using automated exposure  control, adjustment of the mA and/or kV according to patient size, or  iterative reconstruction  technique    SAHRA CALIX MD   MR Brain w/o & w Contrast    Narrative    MRI OF THE BRAIN WITHOUT AND WITH CONTRAST 4/8/2021 6:10 PM     COMPARISON: Head CT same day.    HISTORY:  Facial droop. Slurred speech.     TECHNIQUE: Axial diffusion-weighted with ADC map, axial T2-weighted  with fat saturation, axial T1-weighted, axial turboFLAIR and coronal  T1-weighted images of the brain were acquired without intravenous  contrast.  Following intravenous administration of gadolinium (10 mL  Gadavist), axial T1-weighted images of the brain were acquired.     FINDINGS: There is moderate diffuse cerebral volume loss. There are a  few tiny scattered focal areas of abnormal T2 signal hyperintensity in  the cerebral white matter bilaterally that are consistent with sequela  of chronic small vessel ischemic disease.    The ventricles and basal cisterns are within normal limits in  configuration given the degree of cerebral volume loss.  There is no  midline shift.  There are no extra-axial fluid collections.  There is  no evidence for stroke or acute intracranial hemorrhage.  There is no  abnormal contrast enhancement in the brain or its coverings.    There is no sinusitis or mastoiditis.      Impression    IMPRESSION: Diffuse cerebral volume loss and cerebral white matter  changes consistent with chronic small vessel ischemic disease. No  evidence for acute intracranial pathology.     SAHRA CALIX MD

## 2021-04-08 NOTE — ED NOTES
[facility]  ED Nurse Handoff Report    ED Chief complaint: Slurred Speech      ED Diagnosis:   Final diagnoses:   Cerebrovascular accident (CVA), unspecified mechanism (H)       Code Status: Full Code    Allergies: No Known Allergies    Patient Story: Tonja Nieves is a 76 year old female who presents to the Emergency Department for an evaluation of slurred speech.  Focused Assessment:  Cardiac: WDL  Resp: WDL  Neuro: A&Ox4, R sided facial droop (resolved), Slurred Speech (resolved)    Treatments and/or interventions provided: ASA 325mg, plavix, 300mg, lipitor to be given at 2000  Patient's response to treatments and/or interventions: NA    To be done/followed up on inpatient unit:  continue care    Does this patient have any cognitive concerns?: NA    Activity level - Baseline/Home:  Independent  Activity Level - Current:   Independent and Stand with Assist    Patient's Preferred language: English   Needed?: No    Isolation: COVID r/o and special precautions *ASYMPTOMATIC COVID TEST PENDING  Infection: COVID r/o and special precautions *ASYMPTOMATIC COVID TEST PENDING  Patient tested for COVID 19 prior to admission: YES  Bariatric?: No    Vital Signs:   Vitals:    04/08/21 1409 04/08/21 1415 04/08/21 1520   BP:  (!) 146/76    Pulse: 50     Resp: 13     Temp: 97.7  F (36.5  C)     TempSrc: Temporal     SpO2:  97% 97%   Weight:   90.7 kg (200 lb)       Cardiac Rhythm:     Was the PSS-3 completed:   Yes  What interventions are required if any?               Family Comments: daughter at bedside  OBS brochure/video discussed/provided to patient/family: No              Name of person given brochure if not patient: na              Relationship to patient: na    For the majority of the shift this patient's behavior was Green.   Behavioral interventions performed were na.    ED NURSE PHONE NUMBER: *[phone]

## 2021-04-09 ENCOUNTER — APPOINTMENT (OUTPATIENT)
Dept: PHYSICAL THERAPY | Facility: CLINIC | Age: 76
DRG: 069 | End: 2021-04-09
Attending: STUDENT IN AN ORGANIZED HEALTH CARE EDUCATION/TRAINING PROGRAM
Payer: MEDICARE

## 2021-04-09 ENCOUNTER — APPOINTMENT (OUTPATIENT)
Dept: CARDIOLOGY | Facility: CLINIC | Age: 76
DRG: 069 | End: 2021-04-09
Attending: STUDENT IN AN ORGANIZED HEALTH CARE EDUCATION/TRAINING PROGRAM
Payer: MEDICARE

## 2021-04-09 ENCOUNTER — HOSPITAL ENCOUNTER (OUTPATIENT)
Dept: CARDIOLOGY | Facility: CLINIC | Age: 76
Setting detail: OBSERVATION
DRG: 069 | End: 2021-04-09
Attending: INTERNAL MEDICINE
Payer: MEDICARE

## 2021-04-09 VITALS
HEART RATE: 52 BPM | DIASTOLIC BLOOD PRESSURE: 72 MMHG | RESPIRATION RATE: 18 BRPM | BODY MASS INDEX: 31.17 KG/M2 | WEIGHT: 199 LBS | TEMPERATURE: 98.7 F | SYSTOLIC BLOOD PRESSURE: 120 MMHG | OXYGEN SATURATION: 96 %

## 2021-04-09 DIAGNOSIS — I63.9 CEREBROVASCULAR ACCIDENT (CVA), UNSPECIFIED MECHANISM (H): ICD-10-CM

## 2021-04-09 LAB
ANION GAP SERPL CALCULATED.3IONS-SCNC: 4 MMOL/L (ref 3–14)
BUN SERPL-MCNC: 24 MG/DL (ref 7–30)
CALCIUM SERPL-MCNC: 8.1 MG/DL (ref 8.5–10.1)
CHLORIDE SERPL-SCNC: 113 MMOL/L (ref 94–109)
CHOLEST SERPL-MCNC: 212 MG/DL
CO2 SERPL-SCNC: 23 MMOL/L (ref 20–32)
CREAT SERPL-MCNC: 1.2 MG/DL (ref 0.52–1.04)
ERYTHROCYTE [DISTWIDTH] IN BLOOD BY AUTOMATED COUNT: 14.6 % (ref 10–15)
GFR SERPL CREATININE-BSD FRML MDRD: 44 ML/MIN/{1.73_M2}
GLUCOSE SERPL-MCNC: 87 MG/DL (ref 70–99)
HBA1C MFR BLD: 5.6 % (ref 0–5.6)
HCT VFR BLD AUTO: 38.1 % (ref 35–47)
HDLC SERPL-MCNC: 86 MG/DL
HGB BLD-MCNC: 12.1 G/DL (ref 11.7–15.7)
LDLC SERPL CALC-MCNC: 110 MG/DL
MCH RBC QN AUTO: 28.9 PG (ref 26.5–33)
MCHC RBC AUTO-ENTMCNC: 31.8 G/DL (ref 31.5–36.5)
MCV RBC AUTO: 91 FL (ref 78–100)
NONHDLC SERPL-MCNC: 126 MG/DL
PLATELET # BLD AUTO: 195 10E9/L (ref 150–450)
POTASSIUM SERPL-SCNC: 4.8 MMOL/L (ref 3.4–5.3)
RBC # BLD AUTO: 4.18 10E12/L (ref 3.8–5.2)
SODIUM SERPL-SCNC: 140 MMOL/L (ref 133–144)
TRIGL SERPL-MCNC: 81 MG/DL
WBC # BLD AUTO: 4.3 10E9/L (ref 4–11)

## 2021-04-09 PROCEDURE — 99207 PR CDG-CODE CATEGORY CHANGED: CPT | Performed by: INTERNAL MEDICINE

## 2021-04-09 PROCEDURE — 250N000013 HC RX MED GY IP 250 OP 250 PS 637: Performed by: STUDENT IN AN ORGANIZED HEALTH CARE EDUCATION/TRAINING PROGRAM

## 2021-04-09 PROCEDURE — 93306 TTE W/DOPPLER COMPLETE: CPT

## 2021-04-09 PROCEDURE — 85027 COMPLETE CBC AUTOMATED: CPT | Performed by: STUDENT IN AN ORGANIZED HEALTH CARE EDUCATION/TRAINING PROGRAM

## 2021-04-09 PROCEDURE — 80048 BASIC METABOLIC PNL TOTAL CA: CPT | Performed by: STUDENT IN AN ORGANIZED HEALTH CARE EDUCATION/TRAINING PROGRAM

## 2021-04-09 PROCEDURE — 97161 PT EVAL LOW COMPLEX 20 MIN: CPT | Mod: GP

## 2021-04-09 PROCEDURE — 93306 TTE W/DOPPLER COMPLETE: CPT | Mod: 26 | Performed by: INTERNAL MEDICINE

## 2021-04-09 PROCEDURE — 93005 ELECTROCARDIOGRAM TRACING: CPT | Mod: XU

## 2021-04-09 PROCEDURE — 83036 HEMOGLOBIN GLYCOSYLATED A1C: CPT | Performed by: STUDENT IN AN ORGANIZED HEALTH CARE EDUCATION/TRAINING PROGRAM

## 2021-04-09 PROCEDURE — 80061 LIPID PANEL: CPT | Performed by: STUDENT IN AN ORGANIZED HEALTH CARE EDUCATION/TRAINING PROGRAM

## 2021-04-09 PROCEDURE — 93270 REMOTE 30 DAY ECG REV/REPORT: CPT

## 2021-04-09 PROCEDURE — 36415 COLL VENOUS BLD VENIPUNCTURE: CPT | Performed by: STUDENT IN AN ORGANIZED HEALTH CARE EDUCATION/TRAINING PROGRAM

## 2021-04-09 PROCEDURE — 99221 1ST HOSP IP/OBS SF/LOW 40: CPT | Mod: GC | Performed by: PSYCHIATRY & NEUROLOGY

## 2021-04-09 PROCEDURE — 93272 ECG/REVIEW INTERPRET ONLY: CPT | Performed by: INTERNAL MEDICINE

## 2021-04-09 PROCEDURE — 99225 PR SUBSEQUENT OBSERVATION CARE,LEVEL II: CPT | Performed by: INTERNAL MEDICINE

## 2021-04-09 RX ORDER — ASPIRIN 325 MG
325 TABLET ORAL DAILY
Qty: 30 TABLET | COMMUNITY
Start: 2021-05-02 | End: 2021-04-09

## 2021-04-09 RX ORDER — ATORVASTATIN CALCIUM 40 MG/1
40 TABLET, FILM COATED ORAL EVERY EVENING
Qty: 30 TABLET | Refills: 0 | Status: SHIPPED | OUTPATIENT
Start: 2021-04-09 | End: 2021-04-23

## 2021-04-09 RX ORDER — CLOPIDOGREL BISULFATE 75 MG/1
75 TABLET ORAL DAILY
Status: DISCONTINUED | OUTPATIENT
Start: 2021-04-10 | End: 2021-04-09 | Stop reason: HOSPADM

## 2021-04-09 RX ORDER — CLOPIDOGREL BISULFATE 75 MG/1
75 TABLET ORAL DAILY
Qty: 21 TABLET | Refills: 0 | Status: SHIPPED | OUTPATIENT
Start: 2021-04-10 | End: 2021-04-23

## 2021-04-09 RX ORDER — ASPIRIN 325 MG
325 TABLET ORAL DAILY
Qty: 30 TABLET | Refills: 0 | COMMUNITY
Start: 2021-05-02 | End: 2021-04-15

## 2021-04-09 RX ADMIN — LEVOTHYROXINE SODIUM 50 MCG: 50 TABLET ORAL at 06:11

## 2021-04-09 RX ADMIN — ASPIRIN 81 MG: 81 TABLET, DELAYED RELEASE ORAL at 10:06

## 2021-04-09 ASSESSMENT — ACTIVITIES OF DAILY LIVING (ADL)
ADLS_ACUITY_SCORE: 13

## 2021-04-09 NOTE — PLAN OF CARE
/71   Pulse 51   Temp 97.9  F (36.6  C) (Oral)   Resp 18   Wt 90.3 kg (199 lb)   SpO2 95%   BMI 31.17 kg/m      Nursing shift note  Summary: In with TIA  Alertness: AOx4 pleasant  Neuro: Intact  Cardiac: Bradycardic. BP less than 120, MD aware  Resp: WDL  GI: WDL  : WDL  CMS: WDL (CHECK TINGLING)  Mobility: SBA  Pain: None  Diet: Regular  Skin: WDL, didn't check coccyx. Edema  Other Important Info: Adequate for discharge. NIH at discharge: 0    Behavior & Aggression Tool color:  -None    Pt's belongings:     (Belongings from admission day present in pt's room)    Home medications:   -None

## 2021-04-09 NOTE — PROGRESS NOTES
[facility]    Hospitalist Progress Note    Assessment & Plan   Tonja Nieves is a 76 year old female with past medical history significant for hypothyroidism, and CKD III admitted on 4/8/2021 with TIA.    Transient ischemic Attack   M3 occlusion   Presented to the ED with some slurred speech and heaviness in both legs and her right arm. By the time she presented to the ED her symptoms were entirely resolved. She now feels completely back to normal. 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 MRI was obtained which showed diffuse cerebral volume loss and white matter changes consistent with chronic small vessel ischemic disease, no acute intracranial pathology.   - neurology consulted , input pending regarding discharge planning  -Patient was on 325 mg of aspirin at home, currently on aspirin 81 with a Plavix 75 mg  -Continue statin, echocardiogram noted, event monitor requested upon discharge.  -Her hemoglobin A1c is in the upper limit of normal.  -Patient seen by PT/OT/speech, recommendation currently is to discharge back home, discharge pending input from neurology.  -Bradycardia noted in prior EKG, currently heart rate running between 50-60, repeat EKG was obtained which shows sinus bradycardia.  I did review her medications, none of them would cause her bradycardia.  Hypothyroidism   Continue PTA levothyroxine      CKD III  Creatinine is 1.49. Baseline creatinine appears to be 1.2-1.3.   -Creatinine is currently close to baseline, lisinopril is on hold to allow improved blood pressure control.    DVT Prophylaxis: SCDs  Code Status: Full Code     Disposition: Expected discharge possibly later today depending on neurology input.    Rasheeda Willis MD  Text Page   (7am to 6pm)    Interval History   Patient is resting, will stay with her when she was getting the echocardiogram,  she mentioned that her symptoms resolved a short while after it was identified, she is feeling much better and back to baseline now.    -Data reviewed today: I reviewed all new labs and imaging results over the last 24 hours.     Physical Exam   Temp: 98.7  F (37.1  C) Temp src: Oral BP: 120/72 Pulse: 52   Resp: 18 SpO2: 96 % O2 Device: None (Room air)    Vitals:    04/08/21 1520 04/09/21 0636   Weight: 90.7 kg (200 lb) 90.3 kg (199 lb)     Vital Signs with Ranges  Temp:  [97.9  F (36.6  C)-98.7  F (37.1  C)] 98.7  F (37.1  C)  Pulse:  [50-67] 52  Resp:  [11-21] 18  BP: (109-141)/(69-97) 120/72  SpO2:  [95 %-99 %] 96 %  No intake/output data recorded.    Constitutional: Awake, alert, cooperative, no apparent distress  Respiratory: Clear to auscultation bilaterally, no crackles or wheezing  Cardiovascular: Regular rate and rhythm, normal S1 and S2, and no murmur noted  GI: Normal bowel sounds, soft, non-distended, non-tender  Skin/Integumen: No rashes, no cyanosis, no edema  Neuro : moving all 4 extremities, no focal deficit noted     Medications     - MEDICATION INSTRUCTIONS -       - MEDICATION INSTRUCTIONS -         aspirin  81 mg Oral Daily     atorvastatin  40 mg Oral or Feeding Tube QPM     [START ON 4/10/2021] clopidogrel  75 mg Oral or Feeding Tube Daily     levothyroxine  50 mcg Oral or Feeding Tube QAM AC     sodium chloride (PF)  3 mL Intracatheter Q8H       Data   Recent Labs   Lab 04/09/21  0758 04/08/21  1415   WBC 4.3 7.8   HGB 12.1 13.7   MCV 91 92    258    142   POTASSIUM 4.8 4.4   CHLORIDE 113* 112*   CO2 23 23   BUN 24 33*   CR 1.20* 1.49*   ANIONGAP 4 7   INO 8.1* 8.6   GLC 87 101*   ALBUMIN  --  3.6   PROTTOTAL  --  7.4   BILITOTAL  --  0.4   ALKPHOS  --  77   ALT  --  34   AST  --  23   TROPI  --  <0.015     Recent Labs   Lab 04/09/21  0758 04/08/21 2228 04/08/21 2128 04/08/21  1415   GLC 87  --   --  101*   BGM  --  93 133*  --        Imaging:   Recent Results (from the past 24  hour(s))   CT Head w/o Contrast    Narrative    CT OF THE HEAD WITHOUT CONTRAST 4/8/2021 3:33 PM     COMPARISON: None    HISTORY: Slurred speech. Right facial droop.    TECHNIQUE: 5 mm thick axial CT images of the head were acquired  without IV contrast material.    FINDINGS: There is mild diffuse cerebral volume loss. There are subtle  patchy areas of decreased density in the cerebral white matter  bilaterally that are consistent with sequela of chronic small-vessel  ischemic disease.    The ventricles and basal cisterns are within normal limits in  configuration given the degree of cerebral volume loss.  There is no  midline shift. There are no extra-axial fluid collections.    No intracranial hemorrhage, mass or recent infarct.    The visualized paranasal sinuses are well-aerated. There is no  mastoiditis. There are no fractures of the visualized bones.       Impression    IMPRESSION:  Diffuse cerebral volume loss and cerebral white matter  changes consistent with chronic small-vessel ischemic disease. No  evidence for acute intracranial pathology.      Radiation dose for this scan was reduced using automated exposure  control, adjustment of the mA and/or kV according to patient size, or  iterative reconstruction technique.    SAHRA CALIX MD   CTA Head Neck with Contrast    Narrative    CT ANGIOGRAM OF THE HEAD AND NECK WITHOUT AND WITH CONTRAST  4/8/2021  3:40 PM     COMPARISON: None    HISTORY: Slurred speech. Right facial droop.    TECHNIQUE:  Precontrast localizing scans were followed by CT  angiography with an injection of 70 mL [id number]-370 nonionic intravenous  contrast material with scans through the head and neck.  Images were  transferred to a separate 3-D workstation where multiplanar  reformations and 3-D images were created.  Estimates of carotid  stenoses are made relative to the distal internal carotid artery  diameters except as noted.      FINDINGS:   Neck CTA: There is a normal variant aberrant  right subclavian artery  passing posterior to the esophagus. The bilateral common carotid,  bilateral cervical internal carotid and bilateral vertebral arteries  are patent without stenosis.     Head CTA: There is probable occlusion of an inferior division branch  of the left middle cerebral artery at the M2 M3 junction in the  posterior aspect of the left sylvian fissure (series 9, image 453). No  other occluded vessels identified. The left middle cerebral artery and  its branches are otherwise within normal limits. The basilar,  bilateral intracranial distal internal carotid, bilateral anterior  cerebral, right middle cerebral and bilateral posterior cerebral  arteries are patent and unremarkable. The anterior communicating and  bilateral posterior communicating arteries are patent.      Impression    IMPRESSION:  1. 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  2. Otherwise, normal neck and head CTA.    This imaging study was discussed with the ordering physician, Dr. GREGORIO MARTINEZ, by Dr. Calix on 4/8/2021 4:00 PM.    Radiation dose for this scan was reduced using automated exposure  control, adjustment of the mA and/or kV according to patient size, or  iterative reconstruction technique    SAHRA CALIX MD   MR Brain w/o & w Contrast    Narrative    MRI OF THE BRAIN WITHOUT AND WITH CONTRAST 4/8/2021 6:10 PM     COMPARISON: Head CT same day.    HISTORY:  Facial droop. Slurred speech.     TECHNIQUE: Axial diffusion-weighted with ADC map, axial T2-weighted  with fat saturation, axial T1-weighted, axial turboFLAIR and coronal  T1-weighted images of the brain were acquired without intravenous  contrast.  Following intravenous administration of gadolinium (10 mL  Gadavist), axial T1-weighted images of the brain were acquired.     FINDINGS: There is moderate diffuse cerebral volume loss. There are a  few tiny scattered focal areas of  abnormal T2 signal hyperintensity in  the cerebral white matter bilaterally that are consistent with sequela  of chronic small vessel ischemic disease.    The ventricles and basal cisterns are within normal limits in  configuration given the degree of cerebral volume loss.  There is no  midline shift.  There are no extra-axial fluid collections.  There is  no evidence for stroke or acute intracranial hemorrhage.  There is no  abnormal contrast enhancement in the brain or its coverings.    There is no sinusitis or mastoiditis.      Impression    IMPRESSION: Diffuse cerebral volume loss and cerebral white matter  changes consistent with chronic small vessel ischemic disease. No  evidence for acute intracranial pathology.     SAHRA CALIX MD   Echocardiogram Complete - For age > 60 yrs    Narrative    [id number]  [id number]  [id number]  2010^SILVANA^TRISTIAN^JAZ     [facility]  Echocardiography Laboratory  [address] [place] [address]     Name: MATT CEDENO  MRN: [id number]  : 1945  Study Date: 2021 09:44 AM  Age: 76 yrs  Gender: Female  Patient Location: [facility]  Reason For Study: Cerebrovascular Incident  Ordering Physician: TRISTIAN PINA  Referring Physician: TRISTIAN PINA  Performed By: IOANA Orlando     BSA: 2.0 m2  Height: 67 in  Weight: 199 lb  HR: 63  BP: 109/69 mmHg  ______________________________________________________________________________  Procedure  Complete Portable Echo Adult.  ______________________________________________________________________________  Interpretation Summary     The left ventricle is normal in size.  The visual ejection fraction is estimated at 60-65%.  Grade I or early diastolic dysfunction.  No regional wall motion abnormalities noted.  No significant valvular heart disease.  ______________________________________________________________________________  Left Ventricle  The left ventricle is normal in size. There is normal  left ventricular wall  thickness. The visual ejection fraction is estimated at 60-65%. Grade I or  early diastolic dysfunction. No regional wall motion abnormalities noted.     Right Ventricle  The right ventricle is normal in size and function.     Atria  The left atrium is moderately dilated. Right atrial size is normal. There is  no color Doppler evidence of an atrial shunt.     Mitral Valve  The mitral valve leaflets are mildly thickened. There is trace mitral  regurgitation.     Tricuspid Valve  There is trace tricuspid regurgitation. Right ventricular systolic pressure  could not be approximated due to inadequate tricuspid regurgitation. IVC  diameter and respiratory changes fall into an intermediate range suggesting an  RA pressure of 8 mmHg.     Aortic Valve  There is trivial trileaflet aortic sclerosis. No aortic regurgitation is  present.     Pulmonic Valve  There is trace to mild pulmonic valvular regurgitation.     Vessels  Normal size aorta. The aortic root is normal size.     Pericardium  There is no pericardial effusion.     Rhythm  Sinus rhythm was noted.  ______________________________________________________________________________  MMode/2D Measurements & Calculations  IVSd: 0.78 cm     LVIDd: 4.6 cm  LVIDs: 2.7 cm  LVPWd: 0.95 cm  FS: 40.6 %  LV mass(C)d: 131.5 grams  LV mass(C)dI: 65.2 grams/m2  Ao root diam: 3.2 cm  asc Aorta Diam: 3.3 cm  LVOT diam: 1.9 cm  LVOT area: 2.8 cm2  LA Volume Indexed (AL/bp): 28.6 ml/m2  RWT: 0.41  TAPSE: 2.3 cm     Doppler Measurements & Calculations  MV E max adenike: 52.0 cm/sec  MV A max adenike: 64.9 cm/sec  MV E/A: 0.80  MV dec time: 0.19 sec  E/E' av.8  Lateral E/e': 4.3  Medial E/e': 5.2     ______________________________________________________________________________  Report approved by: Sara Flores 2021 12:21 PM

## 2021-04-09 NOTE — CONSULTS
"[facility]    Stroke Progress Note    Interval Events  No acute overnight events. MRI brain shows slight increase in time to peak in distal MCA territory in area of occlusion.     Impression  Transient Ischemic Attack   #M3 occlusion   #ESUS  ECHO shows left atrium is moderately dilated with EF of 60-65%     a1c 5.6     Plan  - Neurochecks and Vital Signs every 4  - Daily aspirin 81 mg for secondary stroke prevention  - Plavix (clopidogrel) 75 mg PO Daily x 21 days   After 21 days, switch to  mg   - Statin: Lipitor 40  - TTE (with Bubble Study if age 60 yrs or less)  - Bedside Glucose Monitoring  - Stroke Education  - Euthermia, Euglycemia  - She will need a 30 day event monitor     We will discuss LINQ implant in clinic if 30 day event monitor is negative for afib.     The Stroke Staff is Dr. Pereira.    Werner Rodriges MD  Vascular Neurology Fellow  To page me or covering stroke neurology team member, click here: AMCOM   Choose \"On Call\" tab at top, then search dropdown box for \"Neurology Adult\", select location, press Enter, then look for stroke/neuro ICU/telestroke.    ______________________________________________________    Medications   Home Meds  Prior to Admission medications    Medication Sig Start Date End Date Taking? Authorizing Provider   levothyroxine (SYNTHROID/LEVOTHROID) 50 MCG tablet Take 1 tablet (50 mcg) by mouth daily Take one by mouth daily 10/9/20  Yes Aaseby-Aguilera, Ramona Ann, PA-C   lisinopril (ZESTRIL) 2.5 MG tablet Take 1 tablet (2.5 mg) by mouth daily 10/9/20  Yes Aaseby-Aguilera, Ramona Ann, PA-C   oxybutynin (OXYTROL) 3.9 MG/24HR BIW patch Place 1 patch onto the skin twice a week  Patient taking differently: Place 1 patch onto the skin twice a week Next due 4/9 5/1/17   Aaseby-Aguilera, Ramona Ann, PA-C       Scheduled Meds    aspirin  81 mg Oral Daily     atorvastatin  40 mg Oral or Feeding Tube QPM     [START ON 4/10/2021] clopidogrel  75 mg " Oral or Feeding Tube Daily     levothyroxine  50 mcg Oral or Feeding Tube QAM AC     sodium chloride (PF)  3 mL Intracatheter Q8H       Infusion Meds    - MEDICATION INSTRUCTIONS -       - MEDICATION INSTRUCTIONS -         PRN Meds  lidocaine 4%, lidocaine (buffered or not buffered), - MEDICATION INSTRUCTIONS -, - MEDICATION INSTRUCTIONS -, sodium chloride (PF), sodium chloride (PF)       PHYSICAL EXAMINATION  Temp:  [97.7  F (36.5  C)-98  F (36.7  C)] 97.9  F (36.6  C)  Pulse:  [50-67] 51  Resp:  [11-21] 18  BP: (109-146)/(69-97) 113/71  SpO2:  [95 %-99 %] 95 %     Neurologic  Mental Status:  alert, oriented x 3, follows commands, speech clear and fluent, naming and repetition normal  Cranial Nerves:  visual fields intact, PERRL, EOMI with normal smooth pursuit, facial sensation intact and symmetric, facial movements symmetric, hearing not formally tested but intact to conversation, palate elevation symmetric and uvula midline, no dysarthria, shoulder shrug strong bilaterally, tongue protrusion midline  Motor:  normal muscle tone and bulk, no abnormal movements, able to move all limbs spontaneously, strength 5/5 throughout upper and lower extremities, no pronator drift  Reflexes:  toes down-going  Sensory:  light touch sensation intact and symmetric throughout upper and lower extremities, no extinction on double simultaneous stimulation   Coordination:  normal finger-to-nose and heel-to-shin bilaterally without dysmetria, rapid alternating movements symmetric  Station/Gait:  deferred     Stroke Scales    NIHSS  Interval (75 admit) (04/08/21 0921)   Interval Comments     1a. Level of Consciousness 0-->Alert, keenly responsive   1b. LOC Questions 0-->Answers both questions correctly   1c. LOC Commands 0-->Performs both tasks correctly   2.   Best Gaze 0-->Normal   3.   Visual 0-->No visual loss   4.   Facial Palsy 0-->Normal symmetrical movements   5a. Motor Arm, Left 0-->No drift, limb holds 90 (or 45) degrees for  full 10 secs   5b. Motor Arm, Right 0-->No drift, limb holds 90 (or 45) degrees for full 10 secs   6a. Motor Leg, Left 0-->No drift, leg holds 30 degree position for full 5 secs   6b. Motor Leg, right 0-->No drift, leg holds 30 degree position for full 5 secs   7.   Limb Ataxia 0-->Absent   8.   Sensory 0-->Normal, no sensory loss   9.   Best Language 0-->No aphasia, normal   10. Dysarthria 0-->Normal   11. Extinction and Inattention  0-->No abnormality   Total 0 (04/09/21 1400)       Imaging  I personally reviewed all imaging; relevant findings per HPI.     Lab Results Data   CBC  Recent Labs   Lab 04/09/21  0758 04/08/21  1415   WBC 4.3 7.8   RBC 4.18 4.66   HGB 12.1 13.7   HCT 38.1 42.8    258     Basic Metabolic Panel    Recent Labs   Lab 04/09/21  0758 04/08/21  1415    142   POTASSIUM 4.8 4.4   CHLORIDE 113* 112*   CO2 23 23   BUN 24 33*   CR 1.20* 1.49*   GLC 87 101*   INO 8.1* 8.6     Liver Panel  Recent Labs   Lab 04/08/21  1415   PROTTOTAL 7.4   ALBUMIN 3.6   BILITOTAL 0.4   ALKPHOS 77   AST 23   ALT 34     INR  No lab results found.   Lipid Profile    Recent Labs   Lab Test 04/09/21  0758 10/22/20  0944 05/02/19  1031 04/13/15  1114 04/13/15  1114 04/10/14  1102 04/08/13  0944   CHOL 212* 228* 239*   < > 209* 192 209*   HDL 86 89 93   < > 92 74 99   * 119* 126*   < > 100 98 90   TRIG 81 101 99   < > 87 95 101   CHOLHDLRATIO  --   --   --   --  2.3 2.6 2.1    < > = values in this interval not displayed.     A1C    Recent Labs   Lab Test 04/09/21  0758   A1C 5.6     Troponin I    Recent Labs   Lab 04/08/21  1415   TROPI <0.015

## 2021-04-09 NOTE — PROVIDER NOTIFICATION
MD paged: [phone] Tonja Nieves. Can this patient discharge? Very stable from a nursing standpoint. Thanks! Javier OREILLY [phone]

## 2021-04-09 NOTE — PHARMACY-CONSULT NOTE
Pharmacy Consult to evaluate for medication related stroke core measures    Tonja Nieves, 76 year old female admitted for cva on 4/8/2021.    Thrombolytic was not given because of minor resolving symptoms    VTE Prophylaxis SCDs /PCDs placed on 4/8, as appropriate prior to end of hospital day 2.    Antithrombotic: aspirin and clopidogrel started on 4/8, as appropriate by end of hospital day 2. Continue antithrombotic therapy on discharge to meet quality measures, unless contraindicated.    Anticoagulation if history of A-fib/flutter: Patient does not have history of A-fib/flutter - anticoagulation not required for medication related stroke core measures.     No components found for: LDLCALC    Patient currently receiving Lipitor (atorvastatin) continue statin on discharge to meet quality measures, unless contraindicated.    Recommendations: None at this time    Thank you for the consult.    Ashley Garcia RPH 4/9/2021 12:46 PM

## 2021-04-09 NOTE — PROGRESS NOTES
Pt here with stroke workup. Slurred speech, now resolved.  A&Ox4. Neuros intact. Admit NIH 0. VSS on RA. Tele SR with PVC. Tolerating combination diet with thin liquid diet. Takes pills whole with water. Up with SBA/GB. Voiding adequately in BR. Denies pain. Pt scoring green  on the Aggression Stop Light Tool. Plan for echo, therapies and neuro to consult.

## 2021-04-09 NOTE — PROGRESS NOTES
04/09/21 0846   Quick Adds   Quick Adds Student Supervision-Eval Only   Type of Visit Initial PT Evaluation   Student Supervision-Eval Only    Student Supervision Direct supervision provided  (Isabel Bermudez, PT, DPT)   Living Environment   People in home alone;other (see comments)  (adult daughter lives across the street)   Current Living Arrangements house   Home Accessibility stairs to enter home   Number of Stairs, Main Entrance 2   Stair Railings, Main Entrance none   Transportation Anticipated family or friend will provide   Self-Care   Usual Activity Tolerance good   Current Activity Tolerance good   Regular Exercise No   Equipment Currently Used at Home none   Activity/Exercise/Self-Care Comment Independent with all ADLs, mows the lawn, laundry, higher level cleaning activities.   Disability/Function   Hearing Difficulty or Deaf no   Use of hearing assistive devices none   Wear Glasses or Blind yes   Vision Management glasses   Concentrating, Remembering or Making Decisions Difficulty no   Difficulty Communicating no   Difficulty Eating/Swallowing no   Walking or Climbing Stairs Difficulty no   Dressing/Bathing Difficulty no   Toileting issues no   Doing Errands Independently Difficulty (such as shopping) no   Fall history within last six months no   Change in Functional Status Since Onset of Current Illness/Injury no   General Information   Onset of Illness/Injury or Date of Surgery 04/08/21   Referring Physician Marichuy Douglas MD   Patient/Family Therapy Goals Statement (PT) return home to [facility]   Pertinent History of Current Problem (include personal factors and/or comorbidities that impact the POC) Presented to the ED with some slurred speech and heaviness in both legs and her right arm. By the time she presented to the ED her symptoms were entirely resolved. She now feels completely back to normal. CTA of the head and neck showed probable thromboembolic occlusion of an inferior division branch  of the left middle cerebral artery at the M2 M3 junction. MRI was obtained which showed diffuse cerebral volume loss and white matter changes consistent with chronic small vessel ischemic disease, no acute intracranial pathology.    Existing Precautions/Restrictions no known precautions/restrictions   Heart Disease Risk Factors Dislipidemia;Overweight;Age   Cognition   Orientation Status (Cognition) oriented x 4   Affect/Mental Status (Cognition) WNL   Follows Commands (Cognition) WNL   Integumentary/Edema   Integumentary/Edema no deficits were identifed   Posture    Posture Forward head position   Range of Motion (ROM)   ROM Quick Adds ROM WNL   Strength   Manual Muscle Testing Quick Adds Strength WNL   Bed Mobility   Comment (Bed Mobility) Independent from bed flat   Transfers   Transfer Safety Comments Independent   Gait/Stairs (Locomotion)   Wilkinson Level (Gait) independent   Pattern (Gait) 2-point   Comment (Gait/Stairs) Patient ambulates independently, no path deviation with rapid head turns. 3 stairs with use of rail, 2 stairs without use of rail with step-through pattern.   Balance   Balance no deficits were identified   Balance Comments standing feet together, eyes closed, tandem stance eyes open, braiding side-stepping. Pt had minimal unsteadiness in tandem stance, but quickly side-stepped to compensate.   Sensory Examination   Sensory Perception WNL   Coordination   Coordination no deficits were identified   Coordination Comments Tested with bilat heel to shin and finger to nose, fast and deliberate.   Muscle Tone   Muscle Tone no deficits were identified   Clinical Impression   Criteria for Skilled Therapeutic Intervention no   Clinical Presentation Stable/Uncomplicated   Clinical Presentation Rationale patient presentation   Clinical Decision Making (Complexity) low complexity   Therapy Frequency (PT) Evaluation only   Anticipated Equipment Needs at Discharge (PT)   (none)   Risk & Benefits of  therapy have been explained evaluation/treatment results reviewed;care plan/treatment goals reviewed;risks/benefits reviewed;current/potential barriers reviewed;participants voiced agreement with care plan;participants included;patient   Clinical Impression Comments Patient demonstrates independence with functional mobility at time of eval. No further PT warranted.   PT Discharge Planning    PT Discharge Recommendation (DC Rec) home   PT Rationale for DC Rec Patient demonstrates independence with functional mobility at time of eval. No further PT warranted. Anticipate patient will be safe returning home. Patient's daughter lives across the street and is available to assist if needed.    PT Brief overview of current status  Independent stairs, gait, functional mobility   Total Evaluation Time   Total Evaluation Time (Minutes) 20

## 2021-04-09 NOTE — PLAN OF CARE
OT: Orders received. Chart reviewed and discussed with care team, including IP PT.  Pt under Observation status. Per chart and discussion with treatment team, pt with TIA and all symptoms have resolved. Pt demonstrates no concerns for I/ADLs. No skilled IP OT warranted.  Defer discharge recommendations to IP PT.   Will complete orders.

## 2021-04-09 NOTE — UTILIZATION REVIEW
"  Admission Status; Secondary Review Determination         Under the authority of the Utilization Management Committee, the utilization review process indicated a secondary review on the above patient.  The review outcome is based on review of the medical records, discussions with staff, and applying clinical experience noted on the date of the review.        ()      Inpatient Status Appropriate - This patient's medical care is consistent with medical management for inpatient care and reasonable inpatient medical practice.      (X) Observation Status Appropriate - This patient does not meet hospital inpatient criteria and is placed in observation status. If this patient's primary payer is Medicare and was admitted as an inpatient, Condition Code 44 should be used and patient status changed to \"observation\".   () Admission Status NOT Appropriate - This patient's medical care is not consistent with medical management for Inpatient or Observation Status.          RATIONALE FOR DETERMINATION     77 yo female with a h/o chronic kidney disease and hypothyroidism who presented with slurred speech and heaviness in both legs and her right arm.  Her symptoms improved.  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 MRI was obtained which showed diffuse cerebral volume loss and white matter changes consistent with chronic small vessel ischemic disease.  She was started on Plavix and a TTE was ordered.  Neurology was consulted.  She has not had a return of her symptoms and is expected to be discharged today.  I spoke to Dr Willis.    The severity of illness, intensity of service provided, expected LOS and risk for adverse outcome make the care complex, high risk and appropriate for hospital admission.        The information on this document is developed by the utilization review team in order for the business office to " ensure compliance.  This only denotes the appropriateness of proper admission status and does not reflect the quality of care rendered.         The definitions of Inpatient Status and Observation Status used in making the determination above are those provided in the CMS Coverage Manual, Chapter 1 and Chapter 6, section 70.4.      Sincerely,     Javier Johnson MD  Physician Advisor  Utilization Review/ Case Management  [facility].

## 2021-04-09 NOTE — PLAN OF CARE
Pt alert and oriented x4. Denies any pain, lightheadedness or nausea. Neuros intact. Ambulates SBA. Continent of bowel and bladder. No BM overnight. Tele shows sinus giuseppe. HR dropped to consistent high 40s. MD notified in person, continue to monitor as long as pt remains asymptomatic and all other vitals remain stable.

## 2021-04-09 NOTE — CONSULTS
Stroke Education Note    The following information has been reviewed with the patient:    1. Warning signs of stroke    2. Calling 911 if having warning signs of stroke    3. All modifiable risk factors: hypertension, CAD, atrial fib, diabetes, hypercholesterolemia, smoking, substance abuse, diet, physical inactivity, obesity, sleep apnea.    4. Patient's risk factors for stroke which include: HLD and physical inactivity    5. Follow-up plan for after discharge    6. Discharge medications which include: Aspirin, Plavix, and Lipitor    In addition, the [facility] Stroke Class Handout has been given to the patient.    Learner's response to risk factors / lifestyle modification education: Taking steps     ELEANOR Moses RN

## 2021-04-09 NOTE — PROVIDER NOTIFICATION
MD paged: [phone] Tonja Nieves. Can this patient discharge today? She has been stable from nursing standpoint. Thanks! Javier OREILLY [phone]

## 2021-04-09 NOTE — PLAN OF CARE
SLP: ST orders received, chart reviewed and discussed with pt. Pt reported facial droop and dysarthria resolved while in the ED. Pt denied any concerns at this time with no obvious deficits in conversation. Will complete orders.

## 2021-04-09 NOTE — PROVIDER NOTIFICATION
Paged neurology: [phone] Tonja Nieves. ITZ Echo is available to view. Thanks! Javier OREILLY [phone]

## 2021-04-12 ENCOUNTER — NURSE TRIAGE (OUTPATIENT)
Dept: FAMILY MEDICINE | Facility: CLINIC | Age: 76
End: 2021-04-12

## 2021-04-12 LAB — INTERPRETATION ECG - MUSE: NORMAL

## 2021-04-12 NOTE — TELEPHONE ENCOUNTER
ER for   Chief Complaint: Cerebrovascular Accident (Cva), Unspecified Mechanism (H), Tia (Transient Ischemic Attack)    ED follow up scheduled 04/16    Taniya Paz/

## 2021-04-12 NOTE — TELEPHONE ENCOUNTER
"S-(situation): RN contacted patient for hospital f/u. Patient noted they had a few dark \"muddy\" colored stools.     B-(background): Patient was recently started on Plavix at hospital     A-(assessment): Patient notes she has had a few \"muddy colored\" stools, lighter brown/black today. Patient notes she has been eating dark green vegetables (spinach) but wanted to know if this was a side effect of plavix prescription. Patient denies any bleeding, dizziness, abdominal pain, diarrhea, constipation, fever, jaundice, no other symptoms - patient reports \"feeling fine\"    R-(recommendations): Routing to PCP to inform. Patient is scheduled for VV w/ PCP 4/16/21.       Hospital/TCU/ED for chronic condition Discharge Protocol    \"Hi, my name is Kashmir Gilbert RN, a registered nurse, and I am calling from [facility].  I am calling to follow up and see how things are going for you after your recent emergency visit/hospital/TCU stay.\"    Tell me how you are doing now that you are home?\" Feeling fine, felt better 15-20minutes after my attack I felt ok      Discharge Instructions    \"Let's review your discharge instructions.  What is/are the follow-up recommendations?  Pt. Response: Patient notes she has had a few \"muddy colored\" stools, lighter brown/black today. Patient notes she has been eating dark green vegetables (spinach) but wanted to know if this was a side effect of plavix prescription. Patient denies any bleeding, dizziness, abdominal pain, diarrhea, constipation, fever, jaundice, no other symptoms - patient reports \"feeling fine\"    \"Has an appointment with your primary care provider been scheduled?\"   Yes. (confirm) 04/16/2021 w/ Ramona Aaseby-Aguilera, PA-C    \"When you see the provider, I would recommend that you bring your medications with you.\"    Medications    \"Tell me what changed about your medicines when you discharged?\"    Changes to chronic meds?    0-1    \"What questions do you have about " "your medications?\"    None     New diagnoses of heart failure, COPD, diabetes, or MI?    No       Post Discharge Medication Reconciliation Status: discharge medications reconciled, continue medications without change.    Was MTM referral placed (*Make sure to put transitions as reason for referral)?   No    Call Summary    \"What questions or concerns do you have about your recent visit and your follow-up care?\"     none - Advised dark color of stools could be due to the dark green vegetables she has been eating but that RN would send message on to provider to obtain opinion.     \"If you have questions or things don't continue to improve, we encourage you contact us through the main clinic number (give number).  Even if the clinic is not open, triage nurses are available 24/7 to help you.     We would like you to know that our clinic has extended hours (provide information).  We also have urgent care (provide details on closest location and hours/contact info)\"      \"Thank you for your time and take care!\"    Kashmir DOWLING RN           "

## 2021-04-12 NOTE — TELEPHONE ENCOUNTER
ED / Discharge Outreach Protocol    Patient Contact    Attempt # 1    Was call answered?  No.  Left message on voicemail with information to call me back.    Kashmir DOWLING RN

## 2021-04-13 ENCOUNTER — TELEPHONE (OUTPATIENT)
Dept: FAMILY MEDICINE | Facility: CLINIC | Age: 76
End: 2021-04-13

## 2021-04-13 NOTE — TELEPHONE ENCOUNTER
[facility] For     Chief Complaint: Cerebrovascular Accident (Cva), Unspecified Mechanism (H), Tia (Transient Ischemic Attack)    ED follow up 04/16    Taniya Paz/

## 2021-04-13 NOTE — TELEPHONE ENCOUNTER
"Called and spoke with patient, patient reports no longer has black stool, today's BM was \"normal\" in color. Patient plans to call back if symptoms arise again, otherwise will see JONN For VV 4/16/21.     Kashmir DOWLING RN    "

## 2021-04-15 ENCOUNTER — HOSPITAL ENCOUNTER (OUTPATIENT)
Facility: CLINIC | Age: 76
Setting detail: OBSERVATION
Discharge: HOME | End: 2021-04-16
Attending: EMERGENCY MEDICINE | Admitting: INTERNAL MEDICINE
Payer: MEDICARE

## 2021-04-15 ENCOUNTER — TELEPHONE (OUTPATIENT)
Dept: FAMILY MEDICINE | Facility: CLINIC | Age: 76
End: 2021-04-15

## 2021-04-15 DIAGNOSIS — K92.2 GASTROINTESTINAL HEMORRHAGE, UNSPECIFIED GASTROINTESTINAL HEMORRHAGE TYPE: ICD-10-CM

## 2021-04-15 LAB
ABO + RH BLD: NORMAL
ABO + RH BLD: NORMAL
ANION GAP SERPL CALCULATED.3IONS-SCNC: 5 MMOL/L (ref 3–14)
APTT PPP: 30 SEC (ref 22–37)
BASOPHILS # BLD AUTO: 0.1 10E9/L (ref 0–0.2)
BASOPHILS NFR BLD AUTO: 1.1 %
BLD GP AB SCN SERPL QL: NORMAL
BLOOD BANK CMNT PATIENT-IMP: NORMAL
BUN SERPL-MCNC: 53 MG/DL (ref 7–30)
CALCIUM SERPL-MCNC: 8.7 MG/DL (ref 8.5–10.1)
CHLORIDE SERPL-SCNC: 109 MMOL/L (ref 94–109)
CO2 SERPL-SCNC: 23 MMOL/L (ref 20–32)
CREAT SERPL-MCNC: 1.46 MG/DL (ref 0.52–1.04)
DIFFERENTIAL METHOD BLD: NORMAL
EOSINOPHIL # BLD AUTO: 0.2 10E9/L (ref 0–0.7)
EOSINOPHIL NFR BLD AUTO: 2.8 %
ERYTHROCYTE [DISTWIDTH] IN BLOOD BY AUTOMATED COUNT: 14.6 % (ref 10–15)
GFR SERPL CREATININE-BSD FRML MDRD: 35 ML/MIN/{1.73_M2}
GLUCOSE SERPL-MCNC: 89 MG/DL (ref 70–99)
HCT VFR BLD AUTO: 38.4 % (ref 35–47)
HEMOCCULT STL QL: POSITIVE
HGB BLD-MCNC: 12.1 G/DL (ref 11.7–15.7)
IMM GRANULOCYTES # BLD: 0 10E9/L (ref 0–0.4)
IMM GRANULOCYTES NFR BLD: 0.4 %
INR PPP: 1.03 (ref 0.86–1.14)
LABORATORY COMMENT REPORT: NORMAL
LYMPHOCYTES # BLD AUTO: 1.3 10E9/L (ref 0.8–5.3)
LYMPHOCYTES NFR BLD AUTO: 17.6 %
MCH RBC QN AUTO: 28.8 PG (ref 26.5–33)
MCHC RBC AUTO-ENTMCNC: 31.5 G/DL (ref 31.5–36.5)
MCV RBC AUTO: 91 FL (ref 78–100)
MONOCYTES # BLD AUTO: 0.6 10E9/L (ref 0–1.3)
MONOCYTES NFR BLD AUTO: 7.7 %
NEUTROPHILS # BLD AUTO: 5.2 10E9/L (ref 1.6–8.3)
NEUTROPHILS NFR BLD AUTO: 70.4 %
NRBC # BLD AUTO: 0 10*3/UL
NRBC BLD AUTO-RTO: 0 /100
PLATELET # BLD AUTO: 267 10E9/L (ref 150–450)
POTASSIUM SERPL-SCNC: 4.4 MMOL/L (ref 3.4–5.3)
RBC # BLD AUTO: 4.2 10E12/L (ref 3.8–5.2)
SARS-COV-2 RNA RESP QL NAA+PROBE: NEGATIVE
SODIUM SERPL-SCNC: 137 MMOL/L (ref 133–144)
SPECIMEN EXP DATE BLD: NORMAL
SPECIMEN SOURCE: NORMAL
WBC # BLD AUTO: 7.4 10E9/L (ref 4–11)

## 2021-04-15 PROCEDURE — 96376 TX/PRO/DX INJ SAME DRUG ADON: CPT

## 2021-04-15 PROCEDURE — 86850 RBC ANTIBODY SCREEN: CPT | Performed by: EMERGENCY MEDICINE

## 2021-04-15 PROCEDURE — 96374 THER/PROPH/DIAG INJ IV PUSH: CPT

## 2021-04-15 PROCEDURE — 85610 PROTHROMBIN TIME: CPT | Performed by: EMERGENCY MEDICINE

## 2021-04-15 PROCEDURE — 86901 BLOOD TYPING SEROLOGIC RH(D): CPT | Performed by: EMERGENCY MEDICINE

## 2021-04-15 PROCEDURE — 80048 BASIC METABOLIC PNL TOTAL CA: CPT | Performed by: EMERGENCY MEDICINE

## 2021-04-15 PROCEDURE — G0378 HOSPITAL OBSERVATION PER HR: HCPCS

## 2021-04-15 PROCEDURE — 250N000011 HC RX IP 250 OP 636: Performed by: EMERGENCY MEDICINE

## 2021-04-15 PROCEDURE — C9113 INJ PANTOPRAZOLE SODIUM, VIA: HCPCS | Performed by: EMERGENCY MEDICINE

## 2021-04-15 PROCEDURE — C9803 HOPD COVID-19 SPEC COLLECT: HCPCS

## 2021-04-15 PROCEDURE — 85730 THROMBOPLASTIN TIME PARTIAL: CPT | Performed by: EMERGENCY MEDICINE

## 2021-04-15 PROCEDURE — 82272 OCCULT BLD FECES 1-3 TESTS: CPT | Performed by: EMERGENCY MEDICINE

## 2021-04-15 PROCEDURE — 87635 SARS-COV-2 COVID-19 AMP PRB: CPT | Performed by: EMERGENCY MEDICINE

## 2021-04-15 PROCEDURE — 99220 PR INITIAL OBSERVATION CARE,LEVEL III: CPT | Performed by: INTERNAL MEDICINE

## 2021-04-15 PROCEDURE — 258N000003 HC RX IP 258 OP 636: Performed by: EMERGENCY MEDICINE

## 2021-04-15 PROCEDURE — C9113 INJ PANTOPRAZOLE SODIUM, VIA: HCPCS | Performed by: INTERNAL MEDICINE

## 2021-04-15 PROCEDURE — 99285 EMERGENCY DEPT VISIT HI MDM: CPT | Mod: 25

## 2021-04-15 PROCEDURE — 85025 COMPLETE CBC W/AUTO DIFF WBC: CPT | Performed by: EMERGENCY MEDICINE

## 2021-04-15 PROCEDURE — 250N000011 HC RX IP 250 OP 636: Performed by: INTERNAL MEDICINE

## 2021-04-15 PROCEDURE — 86900 BLOOD TYPING SEROLOGIC ABO: CPT | Performed by: EMERGENCY MEDICINE

## 2021-04-15 RX ORDER — ONDANSETRON 4 MG/1
4 TABLET, ORALLY DISINTEGRATING ORAL EVERY 6 HOURS PRN
Status: DISCONTINUED | OUTPATIENT
Start: 2021-04-15 | End: 2021-04-16 | Stop reason: HOSPADM

## 2021-04-15 RX ORDER — ONDANSETRON 2 MG/ML
4 INJECTION INTRAMUSCULAR; INTRAVENOUS EVERY 6 HOURS PRN
Status: DISCONTINUED | OUTPATIENT
Start: 2021-04-15 | End: 2021-04-16 | Stop reason: HOSPADM

## 2021-04-15 RX ORDER — AMOXICILLIN 250 MG
1 CAPSULE ORAL 2 TIMES DAILY PRN
Status: DISCONTINUED | OUTPATIENT
Start: 2021-04-15 | End: 2021-04-16 | Stop reason: HOSPADM

## 2021-04-15 RX ORDER — LEVOTHYROXINE SODIUM 50 UG/1
50 TABLET ORAL DAILY
Status: DISCONTINUED | OUTPATIENT
Start: 2021-04-16 | End: 2021-04-16 | Stop reason: HOSPADM

## 2021-04-15 RX ORDER — AMOXICILLIN 250 MG
2 CAPSULE ORAL 2 TIMES DAILY PRN
Status: DISCONTINUED | OUTPATIENT
Start: 2021-04-15 | End: 2021-04-16 | Stop reason: HOSPADM

## 2021-04-15 RX ORDER — ACETAMINOPHEN 325 MG/1
650 TABLET ORAL EVERY 4 HOURS PRN
Status: DISCONTINUED | OUTPATIENT
Start: 2021-04-15 | End: 2021-04-16 | Stop reason: HOSPADM

## 2021-04-15 RX ORDER — ACETAMINOPHEN 650 MG/1
650 SUPPOSITORY RECTAL EVERY 4 HOURS PRN
Status: DISCONTINUED | OUTPATIENT
Start: 2021-04-15 | End: 2021-04-16 | Stop reason: HOSPADM

## 2021-04-15 RX ADMIN — PANTOPRAZOLE SODIUM 40 MG: 40 INJECTION, POWDER, FOR SOLUTION INTRAVENOUS at 12:22

## 2021-04-15 RX ADMIN — PANTOPRAZOLE SODIUM 40 MG: 40 INJECTION, POWDER, FOR SOLUTION INTRAVENOUS at 19:08

## 2021-04-15 RX ADMIN — SODIUM CHLORIDE 1000 ML: 9 INJECTION, SOLUTION INTRAVENOUS at 12:23

## 2021-04-15 ASSESSMENT — ENCOUNTER SYMPTOMS
DIARRHEA: 0
BLOOD IN STOOL: 1
ABDOMINAL PAIN: 0
NAUSEA: 0
VOMITING: 0
LIGHT-HEADEDNESS: 1
CHILLS: 0
FEVER: 0
CONSTIPATION: 0

## 2021-04-15 ASSESSMENT — MIFFLIN-ST. JEOR: SCORE: 1465.23

## 2021-04-15 NOTE — ED PROVIDER NOTES
History   Chief Complaint:  Black Stool    HPI   Tonja Nieves is a 76 year old female, diagnosed and admitted from 4/8-4/9 with a TIA with discharge on Plavix and aspirin. She has additional history consisting of CKD III and hypothyroidism, who presents to the ED for evaluation of bloody stool. The patient reports she has had black stools for the past four days, which the patient believes started after she was started on her Plavix and aspirin. The patient says she was watching it over the past few days, but then yesterday she began to become lightheaded when she was standing. She attributed this feeling to not eating as much that day and felt better after drinking some fluids and eating. However, this morning, her lightheadedness persisted when she would stand and was more aggressive, therefore, she decided to come in and be evaluated. The patient denies any nausea, vomiting, or abdominal pain. She has not had any bright red stools. She has not had any diarrhea or constipation, rather she says her bowel movements have been normal aside from the black appearance to her stool. When she is sitting down, the patient does not feel lightheaded. She has not had any fever, chills, or any other acute symptoms.     Review of Systems   Constitutional: Negative for chills and fever.   Gastrointestinal: Positive for blood in stool. Negative for abdominal pain, constipation, diarrhea, nausea and vomiting.   Neurological: Positive for light-headedness.   All other systems reviewed and are negative.    Allergies:  No known drug allergies    Medications:    Aspirin  Lipitor  Plavix  Synthroid  Oxybutynin    Past Medical History:    CKD III  Hypothyroidism  TIA  CVA    Past Surgical History:    Bilateral hip arthroplasty  Ru-en-Y gastric bypass  Tonsillectomy    Family History:    Alzheimer disease  Hypertension  Diabetes  Heart disease  MS  Thyroid disease    Social History:  PCP: Ramona Ann Aaseby-Aguilera  Presents to the ED  with family    Physical Exam     Patient Vitals for the past 24 hrs:   BP Temp Pulse Resp SpO2 Weight   04/15/21 1057 (!) 146/57 97.6  F (36.4  C) 54 16 98 % 89.4 kg (197 lb)       Physical Exam  Vitals signs and nursing note reviewed.   HENT:      Head: Normocephalic.      Mouth/Throat:      Mouth: Mucous membranes are dry.   Eyes:      Pupils: Pupils are equal, round, and reactive to light.   Cardiovascular:      Rate and Rhythm: Normal rate and regular rhythm.      Pulses: Normal pulses.   Pulmonary:      Effort: Pulmonary effort is normal.      Breath sounds: Normal breath sounds.   Abdominal:      General: Bowel sounds are normal.      Palpations: Abdomen is soft.   Genitourinary:     Rectum: Guaiac result positive.      Comments: Black tarry stool noted  Skin:     General: Skin is warm.      Capillary Refill: Capillary refill takes less than 2 seconds.   Neurological:      General: No focal deficit present.      Mental Status: She is alert.           Emergency Department Course   Laboratory:  CBC: WBC 7.4, HGB 12.1,     BMP: BUN 53 (H), Creatinine 1.46 (H), GFR 35 (L), o/w WNL    INR: 1.03    PTT: 30    Occult blood stool: Positive    AB0/Rh type and screen: A Rh+. Antibodies Negative    COVID-19 Virus PCR: Pending    Emergency Department Course:    Reviewed:  I reviewed the patient's nursing notes, vitals, past available medical records.     Assessments:  1113: I obtained history and examined the patient as noted above.     1255: I rechecked the patient and explained findings. The patient agrees and is amendable to admission.    Consults:   1230: I spoke to Dr. Reyes on-call for GI.    1245: I spoke to Dr. Marte on-call for the hospitalist service. He will admit the patient.     Interventions:  1222: NS 1L IV Bolus   1223: Protonix 40 mg in IV Infusion    Disposition:  The patient was admitted to the hospital under the care of Dr. Marte.    Impression & Plan    Medical Decision Making:  She  presents with black stool with a history gastric bypass and recently placed on Plavix hemoglobin is stable but is guaiac positive in her stool.  Care was discussed with the on-call gastroenterologist who recommends admission for IV Protonix and reassessment for endoscopy.  Care was discussed with the hospital and was admitted under observation as patient shows no signs of anemia or hemodynamic instability suspect patient needs a endoscope but per gastroenterology was admitted for definitive evaluation and an observation stay.    Covid-19  Tonja Nieves was evaluated during a global COVID-19 pandemic, which necessitated consideration that the patient might be at risk for infection with the SARS-CoV-2 virus that causes COVID-19.   Applicable protocols for evaluation were followed during the patient's care.   COVID-19 was considered as part of the patient's evaluation. The plan for testing is:  a test was obtained during this visit.    Diagnosis:    ICD-10-CM    1. Gastrointestinal hemorrhage, unspecified gastrointestinal hemorrhage type  K92.2      Scribe Disclosure:  Yunier BLANCO, am serving as a scribe at 11:13 AM on 4/15/2021 to document services personally performed by Harsh Gill MD based on my observations and the provider's statements to me.      Harsh Gill MD  04/19/21 0022

## 2021-04-15 NOTE — H&P
[facility]    History and Physical - Hospitalist Service       Date of Admission:  4/15/2021    Assessment & Plan   Tonja Nieves is a 76 year old female with a history of Grace-en-y and hypothyroidism an was just admitted to the hospital a week ago for TIA and placed on DAPT presented to the ED on 4/15/2021 with melena    Melena  H/o Gastric bypass (Grace-en-y)  Presented with melena since Saturday.  Over past day has been having increasing light headedness when standing.  Was just placed on DAPT a week ago for TIA.  Hemoglobin in the ED was 12.1 which is the same as it was on 4/9/21.  Hemoccult positive.  [facility] made aware in the ED and recommended bringing the patient in for EGD   - Admission to observation   - Repeat CBC in AM.  Can check sooner if significant bleeding noted  - IV Protonix 40 mg BID   - [facility] consulted and appreciate their recommendations     Bigeminy  Frequent PVCs  Noted intermittently on telemetry in the ED.  Asymptomatic.  Currently has a cardiac event monitor in place  - Will monitor on telemetry for now  - If becomes symptomatic consider cardiology consult otherwise can follow up as an outpatient follow up     Recent TIA  No residual symptoms.  Still on cardiac monitor   - Holding DAPT   - Follow up as planned    CKD Stage III   Baseline Cr 1.2-1.4 and was 1.46 on admission   - Avoid nephrotoxins as able     Hypothyroidism  - PTA synthroid     Asymptomatic COVID 19 PCR PENDING        Diet:   Clear liquid diet and NPO at midnight  DVT Prophylaxis: Low Risk/Ambulatory with no VTE prophylaxis indicated  Segovia Catheter: not present  Code Status:  Full code         Disposition Plan   Expected discharge: Tomorrow, recommended to prior living arrangement once GI evaluation complete .  Entered: Gregg Marte DO 04/15/2021, 1:06 PM     The patient's care was discussed with the patient and ED provider    Gregg Marte DO  [facility]  Hospital  Contact information available via [facility] Paging/Directory      ______________________________________________________________________    Chief Complaint   Melena    History is obtained from the patient    History of Present Illness   Tonja Nieves is a 76 year old female with a history of Grace-en-y and hypothyroidism an was just admitted to the hospital a week ago for TIA and placed on DAPT presented to the ED on 4/15/2021 with melena.  Melena first noted on Saturday and has persisted.  Yesterday also began to feel light headed with standing and notes that has been worsening.  No NSAID use.  No chest pain or SOB.  Denies any abdominal pain, N/V, diarrhea, bright red blood.  Also denies any fevers, chills, cough, sore throat, palpitations, leg pain or leg swelling.     Review of Systems    The 10 point Review of Systems is negative other than noted in the HPI or here    Past Medical History    I have reviewed this patient's medical history and updated it with pertinent information if needed.   Past Medical History:   Diagnosis Date     Chronic kidney disease, stage 3, mod decreased GFR      Hypothyroid      TIA (transient ischemic attack) 04/08/2021     Urge incontinence        Past Surgical History   I have reviewed this patient's surgical history and updated it with pertinent information if needed.  Past Surgical History:   Procedure Laterality Date     bilateral hip replacement  2006     COLONOSCOPY       GASTRIC BYPASS  2005    rounx-en-y     TONSILLECTOMY      age 9       Social History   I have reviewed this patient's social history and updated it with pertinent information if needed.  Social History     Tobacco Use     Smoking status: Never Smoker     Smokeless tobacco: Never Used   Substance Use Topics     Alcohol use: No     Alcohol/week: 0.0 standard drinks     Drug use: No       Family History   I have reviewed this patient's family history and updated it with pertinent information if  needed.  Family History   Problem Relation Age of Onset     Alzheimer Disease Mother      Hypertension Mother      Diabetes Father      Heart Disease Father      Genetic Disorder Brother 55        ms     Thyroid Disease Sister 60        thyroid      Thyroid Disease Daughter      Diabetes Paternal Grandmother        Prior to Admission Medications   Prior to Admission Medications   Prescriptions Last Dose Informant Patient Reported? Taking?   aspirin (ASA) 325 MG tablet   Yes No   Sig: Take 1 tablet (325 mg) by mouth daily   aspirin (ASA) 81 MG EC tablet   No No   Sig: Take 1 tablet (81 mg) by mouth daily for 21 days   atorvastatin (LIPITOR) 40 MG tablet   No No   Si tablet (40 mg) by Oral or Feeding Tube route every evening   clopidogrel (PLAVIX) 75 MG tablet   No No   Si tablet (75 mg) by Oral or Feeding Tube route daily for 21 days   levothyroxine (SYNTHROID/LEVOTHROID) 50 MCG tablet  Daughter No No   Sig: Take 1 tablet (50 mcg) by mouth daily Take one by mouth daily   oxybutynin (OXYTROL) 3.9 MG/24HR BIW patch  Daughter No No   Sig: Place 1 patch onto the skin twice a week   Patient taking differently: Place 1 patch onto the skin twice a week Next due       Facility-Administered Medications: None     Allergies   No Known Allergies    Physical Exam   Vital Signs: Temp: 97.6  F (36.4  C)   BP: (!) 146/57 Pulse: 54   Resp: 16 SpO2: 98 % O2 Device: None (Room air)    Weight: 197 lbs 0 oz    General Appearance: Resting comfortably. NAD   Eyes: EOMI.  Normal conjuctiva  HEENT: NC/AT.  Moist mucous membranes    Respiratory: Clear to auscultation.  No respiratory distress  Cardiovascular: Bigeminy intermittently on telemetry. Sounds RRR but no obvious murmurs  GI: Soft.  Non-tender  Skin: No obvious rashes or cyanosis  Musculoskeletal: No edema.  No calf tenderness  Neurologic: CN appear intact.  Moving all extremities grossly  Psychiatric: Alert.  Oriented x3    Data   Data reviewed today: I reviewed all  medications, new labs and imaging results over the last 24 hours. I personally reviewed no images or EKG's today.    Recent Labs   Lab 04/15/21  1130 04/09/21  0758 04/08/21  1415   WBC 7.4 4.3 7.8   HGB 12.1 12.1 13.7   MCV 91 91 92    195 258   INR 1.03  --   --     140 142   POTASSIUM 4.4 4.8 4.4   CHLORIDE 109 113* 112*   CO2 23 23 23   BUN 53* 24 33*   CR 1.46* 1.20* 1.49*   ANIONGAP 5 4 7   INO 8.7 8.1* 8.6   GLC 89 87 101*   ALBUMIN  --   --  3.6   PROTTOTAL  --   --  7.4   BILITOTAL  --   --  0.4   ALKPHOS  --   --  77   ALT  --   --  34   AST  --   --  23   TROPI  --   --  <0.015     No results found for this or any previous visit (from the past 24 hour(s)).

## 2021-04-15 NOTE — TELEPHONE ENCOUNTER
"Received call from patient, patient states she is experiencing \"muddy\" colored stools again and is wondering if she can be seen in office tomorrow by PCP. Informed PCP is virtual only tomorrow. Offered in office appointment today, patient declined stating she has someone scheduled to come to her house for appointment. Offered in office appointments tomorrow with other providers, patient declined and stated she would like to continue with virtual appointment tomorrow w/ PCP and then go from there.     Kashmir DOWLING RN    "

## 2021-04-15 NOTE — ED TRIAGE NOTES
Pt was recently seen for TIA. Pt was started on plavix and asa. Pt noted dark stool since Sunday. Pt feeling lightheaded.

## 2021-04-15 NOTE — PHARMACY-ADMISSION MEDICATION HISTORY
Pharmacy Medication History  Admission medication history interview status for the 4/15/2021  admission is complete. See EPIC admission navigator for prior to admission medications     Location of Interview: Phone  Medication history sources: Patient and daughter    Significant changes made to the medication list:  None    In the past week, patient estimated taking medication this percent of the time: greater than 90%    Additional medication history information:   None    Medication reconciliation completed by provider prior to medication history? Yes    Time spent in this activity: 20 mins    Prior to Admission medications    Medication Sig Last Dose Taking? Auth Provider   aspirin (ASA) 81 MG EC tablet Take 1 tablet (81 mg) by mouth daily for 21 days 4/15/2021 at am Yes Rasheeda Willis MD   atorvastatin (LIPITOR) 40 MG tablet 1 tablet (40 mg) by Oral or Feeding Tube route every evening 4/14/2021 at pm Yes Rasheeda Willis MD   clopidogrel (PLAVIX) 75 MG tablet 1 tablet (75 mg) by Oral or Feeding Tube route daily for 21 days 4/15/2021 at am Yes Rasheeda Willis MD   levothyroxine (SYNTHROID/LEVOTHROID) 50 MCG tablet Take 1 tablet (50 mcg) by mouth daily Take one by mouth daily 4/15/2021 at am Yes Aaseby-Aguilera, Ramona Ann, PA-C   oxybutynin (OXYTROL) 3.9 MG/24HR BIW patch Place 1 patch onto the skin twice a week 4/12/2021 at am Yes Aaseby-Aguilera, Ramona Ann, PA-C   aspirin (ASA) 325 MG EC tablet Take 325 mg by mouth daily start after Aspirin 81mg completed  Unknown, Entered By History       The information provided in this note is only as accurate as the sources available at the time of update(s)

## 2021-04-15 NOTE — CONSULTS
GASTROENTEROLOGY CONSULTATION    Tonja Nieves  [address] [facility] [id number]  76 year old female    Admission Date/Time: 4/15/2021  Primary Care Provider: Aaseby-Aguilera, Ramona Ann    We were asked to see the patient in consultation by Dr. Marte for evaluation of melena.       HPI: Tonja Nieves is a 76 year old female with PMH including Grace-en-Y gastric bypass (2005), CKD, C. difficile infection (2005), hypothyroidism, recent TIA last week (Plavix and ASA) who presented to [facility] today with lightheadedness and melena since April 10.    She was diagnosed with a TIA last week, and is on Plavix and aspirin.  She began having 1 dark, tarry stool per day April 10, 2021, and this has persisted throughout the week.  There is no associated abdominal pain, GERD, heartburn, regurgitation, nausea, vomiting, change in bowel habits, or hematochezia. She has not been on any blood thinners or had any other NSAID use prior to this.  She does not smoke or consume alcohol.    Laboratory work on admission: Hemoglobin 12.1 (12.1 on 4/9), WBC 7.4, occult blood positive, creatinine 1.46, BUN 53, INR 1.03.    She has never had an upper endoscopy.  Last colonoscopy in 2013 showed a poor prep, for 4 to 8 mm polyps in the ascending colon were removed along with external and internal hemorrhoids.  A repeat colonoscopy in 3 to 6 months was recommended, but was not completed.  She did have a difficult time tolerating the prep due to her gastric bypass surgery.    No family history of any GI related conditions or malignancies.    ROS: A comprehensive ten point review of systems was negative aside from those in mentioned in the HPI.      MEDICATIONS: No current facility-administered medications on file prior to encounter.   aspirin (ASA) 81 MG EC tablet, Take 1 tablet (81 mg) by mouth daily for 21 days  atorvastatin (LIPITOR) 40 MG tablet, 1 tablet (40 mg) by Oral or Feeding Tube route every  MD at bedside. "evening  clopidogrel (PLAVIX) 75 MG tablet, 1 tablet (75 mg) by Oral or Feeding Tube route daily for 21 days  levothyroxine (SYNTHROID/LEVOTHROID) 50 MCG tablet, Take 1 tablet (50 mcg) by mouth daily Take one by mouth daily  oxybutynin (OXYTROL) 3.9 MG/24HR BIW patch, Place 1 patch onto the skin twice a week  [START ON 4/29/2021] aspirin (ASA) 325 MG EC tablet, Take 325 mg by mouth daily        ALLERGIES: No Known Allergies    Past Medical History:   Diagnosis Date     Chronic kidney disease, stage 3, mod decreased GFR      Hypothyroid      TIA (transient ischemic attack) 04/08/2021     Urge incontinence        Past Surgical History:   Procedure Laterality Date     bilateral hip replacement  2006     COLONOSCOPY       GASTRIC BYPASS  2005    rounx-en-y     TONSILLECTOMY      age 9       SOCIAL HISTORY:  Social History     Tobacco Use     Smoking status: Never Smoker     Smokeless tobacco: Never Used   Substance Use Topics     Alcohol use: No     Alcohol/week: 0.0 standard drinks     Drug use: No       FAMILY HISTORY:  Family History   Problem Relation Age of Onset     Alzheimer Disease Mother      Hypertension Mother      Diabetes Father      Heart Disease Father      Genetic Disorder Brother 55        ms     Thyroid Disease Sister 60        thyroid      Thyroid Disease Daughter      Diabetes Paternal Grandmother        PHYSICAL EXAM:   BP (!) 147/72 (BP Location: Left arm)   Pulse (!) 39   Temp 96.8  F (36  C) (Oral)   Resp 16   Ht 1.702 m (5' 7\")   Wt 93.3 kg (205 lb 9.6 oz)   SpO2 100%   BMI 32.20 kg/m     Constitutional: healthy, alert, no acute distress  Cardiovascular: regular rate and rhythm, no murmur or edema   Respiratory: clear to auscultation bilaterally  Psychiatric: normal pleasant affect  Head: atraumatic, normocephalic  Neck: supple, no thyromegaly  ENT: mucous membranes are moist, no oral lesions are noted, no scleral icterus   Abdomen: soft, non-tender, non-distended, normally active bowel " sounds. No rebound tenderness or guarding  Neuro: Neurologically intact grossly, no asterixis   Skin: warm, dry, no rashes are noted      LABORATORY WORK  Recent Labs   Lab Test 04/15/21  1130 04/09/21  0758 04/08/21  1415   WBC 7.4 4.3 7.8   HGB 12.1 12.1 13.7   MCV 91 91 92    195 258   INR 1.03  --   --      Recent Labs   Lab Test 04/15/21  1130 04/09/21  0758 04/08/21  1415    140 142   POTASSIUM 4.4 4.8 4.4   CHLORIDE 109 113* 112*   CO2 23 23 23   BUN 53* 24 33*   CR 1.46* 1.20* 1.49*   ANIONGAP 5 4 7   INO 8.7 8.1* 8.6     Recent Labs   Lab Test 04/08/21  1415 10/22/20  0944 05/02/19  1031 10/30/18  1512 04/13/15  1120 04/13/15  1120 04/10/14  1103 04/10/14  1103   ALBUMIN 3.6 3.4 3.6  --    < >  --    < >  --    BILITOTAL 0.4 0.5 0.5  --    < >  --    < >  --    ALT 34 27 29  --    < >  --    < >  --    AST 23 20 22  --    < >  --    < >  --    ALKPHOS 77 72 72  --    < >  --    < >  --    PROTEIN  --   --   --  Negative  --  Negative  --  Trace*    < > = values in this interval not displayed.       ENDOSCOPIC WORKUP  Colonoscopy 5/21/2013   Findings:        A moderate amount of stool was found in the cecum and proximal ascending        colon, precluding adequate visualization. Four semi-sessile polyps were        found in the ascending colon. The polyps were 4 to 8 mm in size. These        polyps were removed with a hot snare. Resection and retrieval were        complete. External and internal hemorrhoids were found.                                                                                    Impression:               - Preparation of the colon was poor.                             - Stool cecum.                             - Four 4 to 8 mm polyps in the ascending colon.                             Resected and retrieved.                             - External and internal hemorrhoids.   Recommendation:           - Await histopathology results.                             - Repeat  colonoscopy in 3-6 months with double                             volume prep.     CONSULTATION ASSESSMENT AND PLAN  76 year old female with PMH including Grace-en-Y gastric bypass (2005), CKD, C. difficile infection (2005), hypothyroidism, recent TIA last week (Plavix and ASA) who presented to [facility] today with lightheadedness and melena since April 10.  Suspect upper GI bleeding due to possible gastritis, peptic ulcer disease, gastric or duodenal AVM, anastomotic ulcer, less likely gastric/small bowel lesion, which is aggravated by the Plavix and aspirin.  Hemoglobin is stable since she was discharged from the hospital last week.  Her vital signs are stable, and there is no evidence of brisk bleeding.  She will be scheduled for an upper endoscopy for further evaluation tomorrow.    Plan  -Ok to have diet tonight; NPO at midnight   -PPI 40mg BID   -Monitor stools/monitor hemoglobin and transfuse to keep >7   -EGD tomorrow   -Will need a screening colonoscopy (likely outpatient, timing pending on findings tomorrow)      I will discuss the patient plan with Dr. Reyes. Thank you for asking us to participate in the care of this patient.    Buffy Wilson, CNP  [facility]  Office: [phone]

## 2021-04-15 NOTE — PROGRESS NOTES
Observation goals:    -diagnostic tests and consults completed and resulted- Not met  -vital signs normal or at patient baseline- Met    Nurse to notify provider when observation goals have been met and patient is ready for discharge.

## 2021-04-15 NOTE — PROGRESS NOTES
Pt is A&Ox4, VSS on RA expt for HTN and bradycardia, Ind in room, denies pain, N/V/D, IV SL, GI consult pending, possible endoscopy today or tomorrow, continue to monitor

## 2021-04-15 NOTE — ED NOTES
[facility]  ED Nurse Handoff Report    ED Chief complaint: Black Stool      ED Diagnosis:   Final diagnoses:   None       Code Status: discuss with hospitalist     Allergies: No Known Allergies    Patient Story: pt had a TIA last week - she was put on some blood thinners and has noted dark tarry stools since Saturday - no complaints of pain .   Focused Assessment:  Alert calm orientated .     Treatments and/or interventions provided: protonix , ivf, admission    Patient's response to treatments and/or interventions: no changes     To be done/followed up on inpatient unit:  monitor for bleeding and pain     Does this patient have any cognitive concerns?: none    Activity level - Baseline/Home:  Independent  Activity Level - Current:   Independent    Patient's Preferred language: English   Needed?: No    Isolation: None  Infection: Not Applicable  Patient tested for COVID 19 prior to admission: YES  Bariatric?: No    Vital Signs:   Vitals:    04/15/21 1057   BP: (!) 146/57   Pulse: 54   Resp: 16   Temp: 97.6  F (36.4  C)   SpO2: 98%   Weight: 89.4 kg (197 lb)       Cardiac Rhythm:     Was the PSS-3 completed:   Yes  What interventions are required if any?               Family Comments: dtr present   OBS brochure/video discussed/provided to patient/family: N/A              Name of person given brochure if not patient: na              Relationship to patient: na    For the majority of the shift this patient's behavior was Green.   Behavioral interventions performed were none needed .    ED NURSE PHONE NUMBER: [phone]

## 2021-04-16 ENCOUNTER — ANESTHESIA EVENT (OUTPATIENT)
Dept: GASTROENTEROLOGY | Facility: CLINIC | Age: 76
End: 2021-04-16
Payer: MEDICARE

## 2021-04-16 ENCOUNTER — ANESTHESIA (OUTPATIENT)
Dept: GASTROENTEROLOGY | Facility: CLINIC | Age: 76
End: 2021-04-16
Payer: MEDICARE

## 2021-04-16 VITALS
RESPIRATION RATE: 16 BRPM | SYSTOLIC BLOOD PRESSURE: 103 MMHG | OXYGEN SATURATION: 99 % | BODY MASS INDEX: 32.27 KG/M2 | WEIGHT: 205.6 LBS | DIASTOLIC BLOOD PRESSURE: 67 MMHG | HEIGHT: 67 IN | HEART RATE: 52 BPM | TEMPERATURE: 98 F

## 2021-04-16 LAB
ERYTHROCYTE [DISTWIDTH] IN BLOOD BY AUTOMATED COUNT: 14.6 % (ref 10–15)
HCT VFR BLD AUTO: 33.1 % (ref 35–47)
HGB BLD-MCNC: 10.5 G/DL (ref 11.7–15.7)
MCH RBC QN AUTO: 29.2 PG (ref 26.5–33)
MCHC RBC AUTO-ENTMCNC: 31.7 G/DL (ref 31.5–36.5)
MCV RBC AUTO: 92 FL (ref 78–100)
PLATELET # BLD AUTO: 214 10E9/L (ref 150–450)
RBC # BLD AUTO: 3.59 10E12/L (ref 3.8–5.2)
UPPER GI ENDOSCOPY: NORMAL
WBC # BLD AUTO: 5 10E9/L (ref 4–11)

## 2021-04-16 PROCEDURE — 36415 COLL VENOUS BLD VENIPUNCTURE: CPT | Performed by: INTERNAL MEDICINE

## 2021-04-16 PROCEDURE — 99217 PR OBSERVATION CARE DISCHARGE: CPT | Performed by: PHYSICIAN ASSISTANT

## 2021-04-16 PROCEDURE — 999N000010 HC STATISTIC ANES STAT CODE-CRNA PER MINUTE: Performed by: INTERNAL MEDICINE

## 2021-04-16 PROCEDURE — 250N000009 HC RX 250: Performed by: NURSE ANESTHETIST, CERTIFIED REGISTERED

## 2021-04-16 PROCEDURE — 250N000011 HC RX IP 250 OP 636: Performed by: INTERNAL MEDICINE

## 2021-04-16 PROCEDURE — 250N000013 HC RX MED GY IP 250 OP 250 PS 637: Performed by: INTERNAL MEDICINE

## 2021-04-16 PROCEDURE — G0378 HOSPITAL OBSERVATION PER HR: HCPCS

## 2021-04-16 PROCEDURE — 258N000003 HC RX IP 258 OP 636: Performed by: NURSE ANESTHETIST, CERTIFIED REGISTERED

## 2021-04-16 PROCEDURE — 85027 COMPLETE CBC AUTOMATED: CPT | Performed by: INTERNAL MEDICINE

## 2021-04-16 PROCEDURE — 99207 PR NO CHARGE LOS: CPT | Performed by: INTERNAL MEDICINE

## 2021-04-16 PROCEDURE — 96376 TX/PRO/DX INJ SAME DRUG ADON: CPT

## 2021-04-16 PROCEDURE — 370N000017 HC ANESTHESIA TECHNICAL FEE, PER MIN: Performed by: INTERNAL MEDICINE

## 2021-04-16 PROCEDURE — 250N000011 HC RX IP 250 OP 636: Performed by: NURSE ANESTHETIST, CERTIFIED REGISTERED

## 2021-04-16 PROCEDURE — C9113 INJ PANTOPRAZOLE SODIUM, VIA: HCPCS | Performed by: INTERNAL MEDICINE

## 2021-04-16 PROCEDURE — 43235 EGD DIAGNOSTIC BRUSH WASH: CPT | Performed by: INTERNAL MEDICINE

## 2021-04-16 RX ORDER — ACETAMINOPHEN 325 MG/1
650 TABLET ORAL EVERY 4 HOURS PRN
COMMUNITY
Start: 2021-04-16 | End: 2021-06-24

## 2021-04-16 RX ORDER — ONDANSETRON 2 MG/ML
INJECTION INTRAMUSCULAR; INTRAVENOUS PRN
Status: DISCONTINUED | OUTPATIENT
Start: 2021-04-16 | End: 2021-04-16

## 2021-04-16 RX ORDER — NALOXONE HYDROCHLORIDE 0.4 MG/ML
0.4 INJECTION, SOLUTION INTRAMUSCULAR; INTRAVENOUS; SUBCUTANEOUS
Status: CANCELLED | OUTPATIENT
Start: 2021-04-16

## 2021-04-16 RX ORDER — NALOXONE HYDROCHLORIDE 0.4 MG/ML
0.2 INJECTION, SOLUTION INTRAMUSCULAR; INTRAVENOUS; SUBCUTANEOUS
Status: CANCELLED | OUTPATIENT
Start: 2021-04-16

## 2021-04-16 RX ORDER — LIDOCAINE HYDROCHLORIDE 20 MG/ML
INJECTION, SOLUTION INFILTRATION; PERINEURAL PRN
Status: DISCONTINUED | OUTPATIENT
Start: 2021-04-16 | End: 2021-04-16

## 2021-04-16 RX ORDER — FLUMAZENIL 0.1 MG/ML
0.2 INJECTION, SOLUTION INTRAVENOUS
Status: CANCELLED | OUTPATIENT
Start: 2021-04-16 | End: 2021-04-17

## 2021-04-16 RX ORDER — LIDOCAINE 40 MG/G
CREAM TOPICAL
Status: DISCONTINUED | OUTPATIENT
Start: 2021-04-16 | End: 2021-04-16 | Stop reason: HOSPADM

## 2021-04-16 RX ORDER — SODIUM CHLORIDE, SODIUM LACTATE, POTASSIUM CHLORIDE, CALCIUM CHLORIDE 600; 310; 30; 20 MG/100ML; MG/100ML; MG/100ML; MG/100ML
INJECTION, SOLUTION INTRAVENOUS CONTINUOUS PRN
Status: DISCONTINUED | OUTPATIENT
Start: 2021-04-16 | End: 2021-04-16

## 2021-04-16 RX ORDER — PROPOFOL 10 MG/ML
INJECTION, EMULSION INTRAVENOUS CONTINUOUS PRN
Status: DISCONTINUED | OUTPATIENT
Start: 2021-04-16 | End: 2021-04-16

## 2021-04-16 RX ORDER — OMEPRAZOLE 20 MG/1
40 TABLET, DELAYED RELEASE ORAL DAILY
Qty: 28 TABLET | Refills: 0 | Status: SHIPPED | OUTPATIENT
Start: 2021-04-16 | End: 2021-04-23

## 2021-04-16 RX ADMIN — LIDOCAINE HYDROCHLORIDE 30 MG: 20 INJECTION, SOLUTION INFILTRATION; PERINEURAL at 13:36

## 2021-04-16 RX ADMIN — LEVOTHYROXINE SODIUM 50 MCG: 50 TABLET ORAL at 06:33

## 2021-04-16 RX ADMIN — SODIUM CHLORIDE, POTASSIUM CHLORIDE, SODIUM LACTATE AND CALCIUM CHLORIDE: 600; 310; 30; 20 INJECTION, SOLUTION INTRAVENOUS at 13:34

## 2021-04-16 RX ADMIN — PROPOFOL 150 MCG/KG/MIN: 10 INJECTION, EMULSION INTRAVENOUS at 13:36

## 2021-04-16 RX ADMIN — PANTOPRAZOLE SODIUM 40 MG: 40 INJECTION, POWDER, FOR SOLUTION INTRAVENOUS at 09:03

## 2021-04-16 RX ADMIN — DEXMEDETOMIDINE HYDROCHLORIDE 4 MCG: 100 INJECTION, SOLUTION INTRAVENOUS at 13:39

## 2021-04-16 RX ADMIN — DEXMEDETOMIDINE HYDROCHLORIDE 8 MCG: 100 INJECTION, SOLUTION INTRAVENOUS at 13:34

## 2021-04-16 RX ADMIN — ONDANSETRON 4 MG: 2 INJECTION INTRAMUSCULAR; INTRAVENOUS at 13:36

## 2021-04-16 NOTE — ANESTHESIA PREPROCEDURE EVALUATION
Anesthesia Pre-Procedure Evaluation    Patient: Tonja Nieves   MRN: [id number] : 1945        Preoperative Diagnosis: Melena [K92.1]   Procedure : Procedure(s):  ESOPHAGOGASTRODUODENOSCOPY (EGD)     Past Medical History:   Diagnosis Date     Chronic kidney disease, stage 3, mod decreased GFR      Hypothyroid      TIA (transient ischemic attack) 2021     Urge incontinence       Past Surgical History:   Procedure Laterality Date     bilateral hip replacement       COLONOSCOPY       GASTRIC BYPASS      rounx-en-y     TONSILLECTOMY      age 9      No Known Allergies   Social History     Tobacco Use     Smoking status: Never Smoker     Smokeless tobacco: Never Used   Substance Use Topics     Alcohol use: No     Alcohol/week: 0.0 standard drinks      Wt Readings from Last 1 Encounters:   04/15/21 93.3 kg (205 lb 9.6 oz)        Anesthesia Evaluation            ROS/MED HX  ENT/Pulmonary:    (-) sleep apnea   Neurologic:     (+) TIA,     Cardiovascular:     (+) -----Taking blood thinners     METS/Exercise Tolerance:     Hematologic:       Musculoskeletal:       GI/Hepatic: Comment: S/p vipul-en-Y   (-) GERD   Renal/Genitourinary:     (+) renal disease, type: CRI,     Endo:     (+) thyroid problem,     Psychiatric/Substance Use:       Infectious Disease:       Malignancy:       Other:            Physical Exam    Airway        Mallampati: II   TM distance: > 3 FB   Neck ROM: full   Mouth opening: > 3 cm    Respiratory Devices and Support         Dental  no notable dental history         Cardiovascular   cardiovascular exam normal          Pulmonary   pulmonary exam normal                OUTSIDE LABS:  CBC:   Lab Results   Component Value Date    WBC 5.0 2021    WBC 7.4 04/15/2021    HGB 10.5 (L) 2021    HGB 12.1 04/15/2021    HCT 33.1 (L) 2021    HCT 38.4 04/15/2021     2021     04/15/2021     BMP:   Lab Results   Component Value Date     04/15/2021      04/09/2021    POTASSIUM 4.4 04/15/2021    POTASSIUM 4.8 04/09/2021    CHLORIDE 109 04/15/2021    CHLORIDE 113 (H) 04/09/2021    CO2 23 04/15/2021    CO2 23 04/09/2021    BUN 53 (H) 04/15/2021    BUN 24 04/09/2021    CR 1.46 (H) 04/15/2021    CR 1.20 (H) 04/09/2021    GLC 89 04/15/2021    GLC 87 04/09/2021     COAGS:   Lab Results   Component Value Date    PTT 30 04/15/2021    INR 1.03 04/15/2021     POC:   Lab Results   Component Value Date    BGM 93 04/08/2021     HEPATIC:   Lab Results   Component Value Date    ALBUMIN 3.6 04/08/2021    PROTTOTAL 7.4 04/08/2021    ALT 34 04/08/2021    AST 23 04/08/2021    ALKPHOS 77 04/08/2021    BILITOTAL 0.4 04/08/2021     OTHER:   Lab Results   Component Value Date    A1C 5.6 04/09/2021    INO 8.7 04/15/2021    TSH 2.04 10/22/2020       Anesthesia Plan    ASA Status:  3   NPO Status:  NPO Appropriate    Anesthesia Type: MAC.              Consents    Anesthesia Plan(s) and associated risks, benefits, and realistic alternatives discussed. Questions answered and patient/representative(s) expressed understanding.     - Discussed with:  Patient         Postoperative Care       PONV prophylaxis: Background Propofol Infusion     Comments:                Albania Slaughter MD, MD

## 2021-04-16 NOTE — DISCHARGE SUMMARY
[facility]    Discharge Summary  Hospitalist    Date of Admission:  4/15/2021  Date of Discharge:  4/16/2021  Discharging Provider: Harsh Gill  Date of Service (when I saw the patient): 04/16/21    Discharge Diagnoses   1. Melena, likely lower GI bleed  2. Acute blood loss anemia  3. H/o Gastric bypass (Grace-en-y)  4. Bigeminy with Frequent PVCs  5. Recent TIA  6. CKD Stage III   7. Hypothyroidism    History of Present Illness   Tonja Nieves is a very pleasant 76 year old female with a history of Grace-en-y gastric bypass, and hypothyroidism.  She was just admitted to the hospital a week ago for TIA and placed on DAPT.  She presented to the ED on 4/15/2021 with reports of melena.    Please refer to the detailed history and physical from Dr. Marte on 4/15/2021 for additional information.    Hospital Course   Melena, likely lower GI bleed  Acute blood loss anemia  H/o Gastric bypass (Grace-en-y)  Presented with melena since Saturday.  Over past day has been having increasing light headedness when standing.  Was just placed on DAPT a week ago for TIA.  Hemoglobin in the ED was 12.1 which is the same as it was on 4/9/21.  Hemoccult positive.  [facility] made aware in the ED and recommended bringing the patient in for EGD   --Repeat Hgb 10.5 and no further signs of bleeding       Recent Labs   Lab 04/16/21  0617 04/15/21  1130   HGB 10.5* 12.1      --[facility] consulted and appreciate their recommendations   --EGD completed 4/16 results as below.     Impression:   Normal esophagus.                             - Gastric bypass with a normal-sized pouch.                             Gastrojejunal anastomosis characterized by healthy                             appearing mucosa.                             - Normal examined jejunum.                             - No source of bleeding on this exam.   Recommendation:             - - Colonoscopy with anesthesia at [facility] as                              outpatient after double prep next week, ordered at                             [facility].                             - Ok to discharge today from GI standpoint, if                             stable, tolerating diet and no evidence of bleeding.                             - If able to be on 1 anti-platelet until                             colonoscopy that may be better, still ok to                             continue Plavix, if needed.                             - If recurrent bleeding, patient will need to come                             back to [facility] ED.   ----Decision made to discontinue aspirin for now, continue Plavix daily for TIA/CVA prophylaxis, and follow-up closely with PCP next week for repeat hemoglobin.  Follow-up with GI as above.  --We will have daily PPI ordered.     Bigeminy  Frequent PVCs  Noted intermittently on telemetry in the ED.  Asymptomatic.  Currently has a cardiac event monitor in place  --Monitor on telemetry without event  --Follow-up with cardiology as outpatient     Recent TIA  No residual symptoms.  Still on cardiac monitor   --Resume Plavix, hold aspirin as above  --Follow-up as planned     CKD Stage III   Baseline Cr 1.2-1.4 and was 1.46 on admission   --Avoid nephrotoxins as able   --Monitor as outpatient with PCP     Hypothyroidism  --PTA synthroid       Asymptomatic COVID 19 PCR NEGATIVE        Harsh Gill PA-C    Patient seen and examined.  Agree with impression and plan.     Taco Piña MD  Pager: [phone]  Cell Phone:  [phone]      Significant Results and Procedures   EGD 4/16/2021 with results as documented above.    Pending Results   Unresulted Labs Ordered in the Past 30 Days of this Admission     No orders found for last 31 day(s).          Code Status   Full Code       Primary Care Physician   Ramona Ann Aaseby-Aguilera    Physical Exam   Temp: 97.3  F (36.3  C) Temp src: Oral BP: 109/63 Pulse: 52   Resp: 16 SpO2: 95 % O2 Device: None  (Room air)    Vitals:    04/15/21 1057 04/15/21 1500   Weight: 89.4 kg (197 lb) 93.3 kg (205 lb 9.6 oz)     Vital Signs with Ranges  Temp:  [97.3  F (36.3  C)-98.9  F (37.2  C)] 97.3  F (36.3  C)  Pulse:  [48-68] 52  Resp:  [9-26] 16  BP: ()/(51-75) 109/63  SpO2:  [94 %-99 %] 95 %  I/O last 3 completed shifts:  In: 200 [I.V.:200]  Out: -     General Appearance: Resting comfortably. NAD   Eyes: EOMI.  Normal conjuctiva  HEENT: NC/AT.  Moist mucous membranes    Respiratory: Clear to auscultation.  No respiratory distress  Cardiovascular:  Heart RRR but no obvious murmurs  GI: Soft.  Non-tender  Skin: No obvious rashes or cyanosis  Musculoskeletal: No edema.  No calf tenderness  Neurologic: CN appear intact.  Moving all extremities grossly  Psychiatric: Alert.  Oriented x3    Discharge Disposition   Discharged to home  Condition at discharge: Stable    Consultations This Hospital Stay   GASTROENTEROLOGY IP CONSULT    Time Spent on this Encounter   I, TRICE Valdivia, personally saw the patient today and spent greater than 30 minutes discharging this patient.    Discharge Orders      Discharge Instructions    Call Dr. Smith if any medical questions at Cell Phone [phone].     Reason for your hospital stay    GI bleed, melena     Follow-up and recommended labs and tests     Follow up with primary care provider, Ramona Ann Aaseby-Aguilera, within 7 days for hospital follow- up.  The following labs/tests are recommended: CBC.  Follow up with [facility] in 2 weeks for colonoscopy.     Activity    Your activity upon discharge: activity as tolerated     Diet    Follow this diet upon discharge: Advance to a regular diet as tolerated     Discharge Medications   Current Discharge Medication List      START taking these medications    Details   acetaminophen (TYLENOL) 325 MG tablet Take 2 tablets (650 mg) by mouth every 4 hours as needed for mild pain  Qty:      Associated Diagnoses:  Gastrointestinal hemorrhage, unspecified gastrointestinal hemorrhage type      omeprazole (PRILOSEC OTC) 20 MG EC tablet Take 2 tablets (40 mg) by mouth daily  Qty: 28 tablet, Refills: 0    Associated Diagnoses: Gastrointestinal hemorrhage, unspecified gastrointestinal hemorrhage type         CONTINUE these medications which have NOT CHANGED    Details   atorvastatin (LIPITOR) 40 MG tablet 1 tablet (40 mg) by Oral or Feeding Tube route every evening  Qty: 30 tablet, Refills: 0    Associated Diagnoses: Cerebrovascular accident (CVA), unspecified mechanism (H)      clopidogrel (PLAVIX) 75 MG tablet 1 tablet (75 mg) by Oral or Feeding Tube route daily for 21 days  Qty: 21 tablet, Refills: 0    Associated Diagnoses: Cerebrovascular accident (CVA), unspecified mechanism (H)      levothyroxine (SYNTHROID/LEVOTHROID) 50 MCG tablet Take 1 tablet (50 mcg) by mouth daily Take one by mouth daily  Qty: 90 tablet, Refills: 3    Associated Diagnoses: Other specified hypothyroidism; Stage 3a chronic kidney disease      oxybutynin (OXYTROL) 3.9 MG/24HR BIW patch Place 1 patch onto the skin twice a week  Qty: 8 patch, Refills: 11    Associated Diagnoses: Urge incontinence of urine         STOP taking these medications       aspirin (ASA) 325 MG EC tablet Comments:   Reason for Stopping:         aspirin (ASA) 81 MG EC tablet Comments:   Reason for Stopping:             Allergies   No Known Allergies  Data   Most Recent 3 CBC's:  Recent Labs   Lab Test 04/16/21  0617 04/15/21  1130 04/09/21  0758   WBC 5.0 7.4 4.3   HGB 10.5* 12.1 12.1   MCV 92 91 91    267 195      Most Recent 3 BMP's:  Recent Labs   Lab Test 04/15/21  1130 04/09/21  0758 04/08/21  1415    140 142   POTASSIUM 4.4 4.8 4.4   CHLORIDE 109 113* 112*   CO2 23 23 23   BUN 53* 24 33*   CR 1.46* 1.20* 1.49*   ANIONGAP 5 4 7   INO 8.7 8.1* 8.6   GLC 89 87 101*     Most Recent 2 LFT's:  Recent Labs   Lab Test 04/08/21  1415 10/22/20  0944   AST 23 20   ALT 34 27    ALKPHOS 77 72   BILITOTAL 0.4 0.5     Most Recent INR's and Anticoagulation Dosing History:  Anticoagulation Dose History     Recent Dosing and Labs Latest Ref Rng & Units 4/15/2021    INR 0.86 - 1.14 1.03        Most Recent 3 Troponin's:  Recent Labs   Lab Test 04/08/21  1415   TROPI <0.015     Most Recent Cholesterol Panel:  Recent Labs   Lab Test 04/09/21  0758   CHOL 212*   *   HDL 86   TRIG 81     Most Recent 6 Bacteria Isolates From Any Culture (See EPIC Reports for Culture Details):No lab results found.  Most Recent TSH, T4 and A1c Labs:  Recent Labs   Lab Test 04/09/21  0758 10/22/20  0944   TSH  --  2.04   A1C 5.6  --      Results for orders placed or performed during the hospital encounter of 04/08/21   CT Head w/o Contrast    Narrative    CT OF THE HEAD WITHOUT CONTRAST 4/8/2021 3:33 PM     COMPARISON: None    HISTORY: Slurred speech. Right facial droop.    TECHNIQUE: 5 mm thick axial CT images of the head were acquired  without IV contrast material.    FINDINGS: There is mild diffuse cerebral volume loss. There are subtle  patchy areas of decreased density in the cerebral white matter  bilaterally that are consistent with sequela of chronic small-vessel  ischemic disease.    The ventricles and basal cisterns are within normal limits in  configuration given the degree of cerebral volume loss.  There is no  midline shift. There are no extra-axial fluid collections.    No intracranial hemorrhage, mass or recent infarct.    The visualized paranasal sinuses are well-aerated. There is no  mastoiditis. There are no fractures of the visualized bones.       Impression    IMPRESSION:  Diffuse cerebral volume loss and cerebral white matter  changes consistent with chronic small-vessel ischemic disease. No  evidence for acute intracranial pathology.      Radiation dose for this scan was reduced using automated exposure  control, adjustment of the mA and/or kV according to patient size, or  iterative  reconstruction technique.    SAHRA CALIX MD   CTA Head Neck with Contrast    Narrative    CT ANGIOGRAM OF THE HEAD AND NECK WITHOUT AND WITH CONTRAST  4/8/2021  3:40 PM     COMPARISON: None    HISTORY: Slurred speech. Right facial droop.    TECHNIQUE:  Precontrast localizing scans were followed by CT  angiography with an injection of 70 mL Isovue-370 nonionic intravenous  contrast material with scans through the head and neck.  Images were  transferred to a separate 3-D workstation where multiplanar  reformations and 3-D images were created.  Estimates of carotid  stenoses are made relative to the distal internal carotid artery  diameters except as noted.      FINDINGS:   Neck CTA: There is a normal variant aberrant right subclavian artery  passing posterior to the esophagus. The bilateral common carotid,  bilateral cervical internal carotid and bilateral vertebral arteries  are patent without stenosis.     Head CTA: There is probable occlusion of an inferior division branch  of the left middle cerebral artery at the M2 M3 junction in the  posterior aspect of the left sylvian fissure (series 9, image 453). No  other occluded vessels identified. The left middle cerebral artery and  its branches are otherwise within normal limits. The basilar,  bilateral intracranial distal internal carotid, bilateral anterior  cerebral, right middle cerebral and bilateral posterior cerebral  arteries are patent and unremarkable. The anterior communicating and  bilateral posterior communicating arteries are patent.      Impression    IMPRESSION:  1. 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  2. Otherwise, normal neck and head CTA.    This imaging study was discussed with the ordering physician, Dr. GREGORIO MARTINEZ, by Dr. Calix on 4/8/2021 4:00 PM.    Radiation dose for this scan was reduced using automated exposure  control, adjustment of the mA  and/or kV according to patient size, or  iterative reconstruction technique    SAHRA CALIX MD   MR Brain w/o & w Contrast    Narrative    MRI OF THE BRAIN WITHOUT AND WITH CONTRAST 2021 6:10 PM     COMPARISON: Head CT same day.    HISTORY:  Facial droop. Slurred speech.     TECHNIQUE: Axial diffusion-weighted with ADC map, axial T2-weighted  with fat saturation, axial T1-weighted, axial turboFLAIR and coronal  T1-weighted images of the brain were acquired without intravenous  contrast.  Following intravenous administration of gadolinium (10 mL  Gadavist), axial T1-weighted images of the brain were acquired.     FINDINGS: There is moderate diffuse cerebral volume loss. There are a  few tiny scattered focal areas of abnormal T2 signal hyperintensity in  the cerebral white matter bilaterally that are consistent with sequela  of chronic small vessel ischemic disease.    The ventricles and basal cisterns are within normal limits in  configuration given the degree of cerebral volume loss.  There is no  midline shift.  There are no extra-axial fluid collections.  There is  no evidence for stroke or acute intracranial hemorrhage.  There is no  abnormal contrast enhancement in the brain or its coverings.    There is no sinusitis or mastoiditis.      Impression    IMPRESSION: Diffuse cerebral volume loss and cerebral white matter  changes consistent with chronic small vessel ischemic disease. No  evidence for acute intracranial pathology.     SAHRA CALIX MD   Echocardiogram Complete - For age > 60 yrs    Narrative    [id number]  [id number]  [id number]  2010^SILVANA^TRISTIAN^JAZ     [facility]  Echocardiography Laboratory  [address] [place] [address]     Name: MATT CEDENO  MRN: [id number]  : 1945  Study Date: 2021 09:44 AM  Age: 76 yrs  Gender: Female  Patient Location: [facility]  Reason For Study: Cerebrovascular Incident  Ordering Physician: TRISTIAN PINA  Referring  Physician: TRISTIAN PINA  Performed By: VAHE Orlando     BSA: 2.0 m2  Height: 67 in  Weight: 199 lb  HR: 63  BP: 109/69 mmHg  ______________________________________________________________________________  Procedure  Complete Portable Echo Adult.  ______________________________________________________________________________  Interpretation Summary     The left ventricle is normal in size.  The visual ejection fraction is estimated at 60-65%.  Grade I or early diastolic dysfunction.  No regional wall motion abnormalities noted.  No significant valvular heart disease.  ______________________________________________________________________________  Left Ventricle  The left ventricle is normal in size. There is normal left ventricular wall  thickness. The visual ejection fraction is estimated at 60-65%. Grade I or  early diastolic dysfunction. No regional wall motion abnormalities noted.     Right Ventricle  The right ventricle is normal in size and function.     Atria  The left atrium is moderately dilated. Right atrial size is normal. There is  no color Doppler evidence of an atrial shunt.     Mitral Valve  The mitral valve leaflets are mildly thickened. There is trace mitral  regurgitation.     Tricuspid Valve  There is trace tricuspid regurgitation. Right ventricular systolic pressure  could not be approximated due to inadequate tricuspid regurgitation. IVC  diameter and respiratory changes fall into an intermediate range suggesting an  RA pressure of 8 mmHg.     Aortic Valve  There is trivial trileaflet aortic sclerosis. No aortic regurgitation is  present.     Pulmonic Valve  There is trace to mild pulmonic valvular regurgitation.     Vessels  Normal size aorta. The aortic root is normal size.     Pericardium  There is no pericardial effusion.     Rhythm  Sinus rhythm was noted.  ______________________________________________________________________________  MMode/2D Measurements & Calculations  IVSd:  0.78 cm     LVIDd: 4.6 cm  LVIDs: 2.7 cm  LVPWd: 0.95 cm  FS: 40.6 %  LV mass(C)d: 131.5 grams  LV mass(C)dI: 65.2 grams/m2  Ao root diam: 3.2 cm  asc Aorta Diam: 3.3 cm  LVOT diam: 1.9 cm  LVOT area: 2.8 cm2  LA Volume Indexed (AL/bp): 28.6 ml/m2  RWT: 0.41  TAPSE: 2.3 cm     Doppler Measurements & Calculations  MV E max adenike: 52.0 cm/sec  MV A max adenike: 64.9 cm/sec  MV E/A: 0.80  MV dec time: 0.19 sec  E/E' av.8  Lateral E/e': 4.3  Medial E/e': 5.2     ______________________________________________________________________________  Report approved by: Sara Flores 2021 12:21 PM

## 2021-04-16 NOTE — PROGRESS NOTES
Observation goals PRIOR TO DISCHARGE    Comments:   -diagnostic tests and consults completed and resulted- Not met   -vital signs normal or at patient baseline-Met   Nurse to notify provider when observation goals have been met and patient is ready for discharge.

## 2021-04-16 NOTE — PLAN OF CARE
Covid negative. A&O X4. VSS on RA except bradycardic. Tele SB. PIV SL. NPO. Plan to do endoscopy today. Up independent. Denies nausea/pain/ SOB. Possible discharge today if GI evaluation complete. Continue to monitor.    Observation goals PRIOR TO DISCHARGE    Comments:   -diagnostic tests and consults completed and resulted- Not met   -vital signs normal or at patient baseline-Met   Nurse to notify provider when observation goals have been met and patient is ready for discharge.

## 2021-04-16 NOTE — ANESTHESIA POSTPROCEDURE EVALUATION
Patient: Tonja Nieves    Procedure(s):  ESOPHAGOGASTRODUODENOSCOPY (EGD)    Diagnosis:Melena [K92.1]  Diagnosis Additional Information: No value filed.    Anesthesia Type:  MAC    Note:     Postop Pain Control: Uneventful            Sign Out: Well controlled pain   PONV: No   Neuro/Psych: Uneventful            Sign Out: Acceptable/Baseline neuro status   Airway/Respiratory: Uneventful            Sign Out: Acceptable/Baseline resp. status   CV/Hemodynamics: Uneventful            Sign Out: Acceptable CV status   Other NRE: NONE   DID A NON-ROUTINE EVENT OCCUR? No         Last vitals:  Vitals:    04/16/21 1417 04/16/21 1420 04/16/21 1440   BP: (!) 88/54 (!) 89/68 109/63   Pulse: 55 (!) 49 52   Resp: 17 9 16   Temp:      SpO2: 97% 99% 95%       Last vitals prior to Anesthesia Care Transfer:  CRNA VITALS  4/16/2021 1316 - 4/16/2021 1416      4/16/2021             Pulse:  56    SpO2:  97 %    Resp Rate (set):  10          Electronically Signed By: Albania Slaughter MD, MD  April 16, 2021  3:10 PM

## 2021-04-16 NOTE — ANESTHESIA CARE TRANSFER NOTE
Patient: Tonja Nieves    Procedure(s):  ESOPHAGOGASTRODUODENOSCOPY (EGD)    Diagnosis: Melena [K92.1]  Diagnosis Additional Information: No value filed.    Anesthesia Type:   MAC     Note:    Oropharynx: oropharynx clear of all foreign objects and spontaneously breathing  Level of Consciousness: drowsy  Oxygen Supplementation: room air    Independent Airway: airway patency satisfactory and stable  Dentition: dentition unchanged  Vital Signs Stable: post-procedure vital signs reviewed and stable  Report to RN Given: handoff report given  Patient transferred to: PACU  Comments: Pt exhibits spontaneous respirations, all monitors and alarms on in PACU, VSS, patent IV, report and transfer of care to RN.    Handoff Report: Identifed the Patient, Identified the Reponsible Provider, Reviewed the pertinent medical history, Discussed the surgical course, Reviewed Intra-OP anesthesia mangement and issues during anesthesia, Set expectations for post-procedure period and Allowed opportunity for questions and acknowledgement of understanding      Vitals: (Last set prior to Anesthesia Care Transfer)  CRNA VITALS  4/16/2021 1316 - 4/16/2021 1350      4/16/2021             Pulse:  56    SpO2:  97 %    Resp Rate (set):  10        Electronically Signed By: LEONELA Angulo CRNA  April 16, 2021  1:50 PM

## 2021-04-16 NOTE — PROGRESS NOTES
Covid negative. AxOx4. VSS on RA, hypertensive and bradycardic at times. Denies nausea/vomiting//diarrhea/pain/dizziness. Lightheaded upon standing. IND in room. Saline locked. NPO @ midnight for endoscopy tomorrow. Monitor stools for black tarry BM and hemoglobin, transfuse if less than 7. Hemoglobin 12.1, next draw in AM. Tele NSR. Continue to monitor.

## 2021-04-16 NOTE — PLAN OF CARE
Observation goals PRIOR TO DISCHARGE    Comments:   -diagnostic tests and consults completed and resulted- met   -vital signs normal or at patient baseline-Met   Nurse to notify provider when observation goals have been met and patient is ready for discharge.

## 2021-04-16 NOTE — PLAN OF CARE
Observation goals PRIOR TO DISCHARGE    Comments:   -diagnostic tests and consults completed and resulted- Not met   -vital signs normal or at patient baseline-Met   Nurse to notify provider when observation goals have been met and patient is ready for discharge.    A&Ox4. VSS. Tele- SR. GI consulted. Plan for EGD this afternoon.

## 2021-04-17 NOTE — DISCHARGE SUMMARY
[facility]    Discharge Summary  Hospitalist    Date of Admission:  4/8/2021  Date of Discharge:  4/9/2021  4:15 PM  Discharging Provider: Rasheeda Willis MD    Discharge Diagnoses   TIA  M3 occlusion    History of Present Illness   Please review admission history and physical.    Hospital Course   Tonja Nieves was admitted on 4/8/2021.  The following problems were addressed during her hospitalization:    Active Problems:    Cerebrovascular accident (CVA), unspecified mechanism (H)  Tonja Nieves is a 76 year old female with past medical history significant for hypothyroidism, and CKD III admitted on 4/8/2021 with TIA.    Transient ischemic Attack   M3 occlusion   Presented to the ED with some slurred speech and heaviness in both legs and her right arm. By the time she presented to the ED her symptoms were entirely resolved. She now feels completely back to normal. 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 MRI was obtained which showed diffuse cerebral volume loss and white matter changes consistent with chronic small vessel ischemic disease, no acute intracranial pathology.  Patient was seen by our stroke neurology team, current plan is to continue aspirin 81 mg and Plavix 75 mg for 3 weeks and then continue full dose aspirin from there on according to point protocol.  Continue statin 40 mg daily-atorvastatin.  Patient had echocardiogram done here, suspect this event is and avoided stroke given evidence of localized M3 occlusion with associated perfusion deficit her hemoglobin A1c is upper limit of normal, need outpatient follow-up regarding this, she had bradycardia noted this admission, which was reviewed by a repeat EKG which continue to show sinus bradycardia.  Needs to place a CardioNet x30 days, follow-up with stroke neurology team, goal blood pressure is less than  130/80.  Hemoglobin A1c goal is less than 7.  Patient was evaluated by PT/OT and discharged to home according to the recommendation.    Hypothyroidism   Continue PTA levothyroxine      CKD III  Creatinine is 1.49. Baseline creatinine appears to be 1.2-1.3.   -Creatinine is currently close to baseline, lisinopril is on hold to allow improved blood pressure control.  Restarted at the time of discharge.           Rasheeda Willis MD, MD    Significant Results and Procedures     Pending Results       Unresulted Labs Ordered in the Past 30 Days of this Admission     No orders found from 3/9/2021 to 4/9/2021.          Code Status   Full Code       Primary Care Physician   Ramona Ann Aaseby-Aguilera    Physical Exam                      Vitals:    04/08/21 1520 04/09/21 0636   Weight: 90.7 kg (200 lb) 90.3 kg (199 lb)     Vital Signs with Ranges     No intake/output data recorded.    The patient was examined on the day of discharge.    Discharge Disposition   Discharged to home  Condition at discharge: Stable    Consultations This Hospital Stay   PATIENT [facility] IP CONSULT  SWALLOW EVAL SPEECH PATH AT BEDSIDE IP CONSULT  SPEECH LANGUAGE PATH ADULT IP CONSULT  PHARMACY IP CONSULT  PHARMACY IP CONSULT  PHARMACY IP CONSULT  PHYSICAL THERAPY ADULT IP CONSULT  OCCUPATIONAL THERAPY ADULT IP CONSULT  CARE MANAGEMENT / SOCIAL WORK IP CONSULT  NEUROLOGY IP CONSULT  SMOKING CESSATION PROGRAM IP CONSULT    Time Spent on this Encounter   I, Rasheeda Willis MD, personally saw the patient today and spent greater than 30 minutes discharging this patient.    Discharge Orders      NEUROLOGY ADULT REFERRAL      Reason for your hospital stay    TIA     Follow-up and recommended labs and tests     Follow up with primary care provider, Ramona Ann Aaseby-Aguilera, within 7 days for hospital follow- up. Please call to schedule appointment  Aaseby-Aguilera, Ramona Ann  [phone] [id number] [facility] [address]     Follow-up with  stroke neurology as recommended  The Clinic will call you to schedule. If you are not contacted please call [phone]     Activity    Your activity upon discharge: activity as tolerated     Discharge Instructions    Follow-up with the stroke neurology as recommended.     Cardiac Event Monitor Adult Pediatric     Diet    Follow this diet upon discharge: Orders Placed This Encounter      Regular Diet Adult     Discharge Medications   Discharge Medication List as of 4/9/2021  3:38 PM      START taking these medications    Details   atorvastatin (LIPITOR) 40 MG tablet 1 tablet (40 mg) by Oral or Feeding Tube route every evening, Disp-30 tablet, R-0, E-Prescribe      clopidogrel (PLAVIX) 75 MG tablet 1 tablet (75 mg) by Oral or Feeding Tube route daily for 21 days, Disp-21 tablet, R-0, E-Prescribe      aspirin (ASA) 81 MG EC tablet Take 1 tablet (81 mg) by mouth daily for 21 days, Disp-21 tablet, R-0, E-Prescribe         CONTINUE these medications which have CHANGED    Details   aspirin (ASA) 325 MG tablet Take 1 tablet (325 mg) by mouth daily, Disp-30 tablet, R-0, Historical         CONTINUE these medications which have NOT CHANGED    Details   levothyroxine (SYNTHROID/LEVOTHROID) 50 MCG tablet Take 1 tablet (50 mcg) by mouth daily Take one by mouth daily, Disp-90 tablet, R-3, E-Prescribe      oxybutynin (OXYTROL) 3.9 MG/24HR BIW patch Place 1 patch onto the skin twice a week, Disp-8 patch, R-11, E-Prescribe         STOP taking these medications       lisinopril (ZESTRIL) 2.5 MG tablet Comments:   Reason for Stopping:             Allergies   No Known Allergies  Data   Most Recent 3 CBC's:  Recent Labs   Lab Test 04/16/21  0617 04/15/21  1130 04/09/21  0758   WBC 5.0 7.4 4.3   HGB 10.5* 12.1 12.1   MCV 92 91 91    267 195      Most Recent 3 BMP's:  Recent Labs   Lab Test 04/15/21  1130 04/09/21  0758 04/08/21  1415    140 142   POTASSIUM 4.4 4.8 4.4   CHLORIDE 109 113* 112*   CO2 23 23 23   BUN 53* 24 33*    CR 1.46* 1.20* 1.49*   ANIONGAP 5 4 7   INO 8.7 8.1* 8.6   GLC 89 87 101*     Most Recent 2 LFT's:  Recent Labs   Lab Test 04/08/21  1415 10/22/20  0944   AST 23 20   ALT 34 27   ALKPHOS 77 72   BILITOTAL 0.4 0.5     Most Recent INR's and Anticoagulation Dosing History:  Anticoagulation Dose History     Recent Dosing and Labs Latest Ref Rng & Units 4/15/2021    INR 0.86 - 1.14 1.03        Most Recent 3 Troponin's:  Recent Labs   Lab Test 04/08/21  1415   TROPI <0.015     Most Recent Cholesterol Panel:  Recent Labs   Lab Test 04/09/21  0758   CHOL 212*   *   HDL 86   TRIG 81     Most Recent 6 Bacteria Isolates From Any Culture (See EPIC Reports for Culture Details):No lab results found.  Most Recent TSH, T4 and A1c Labs:  Recent Labs   Lab Test 04/09/21  0758 10/22/20  0944   TSH  --  2.04   A1C 5.6  --      Results for orders placed or performed during the hospital encounter of 04/08/21   CT Head w/o Contrast    Narrative    CT OF THE HEAD WITHOUT CONTRAST 4/8/2021 3:33 PM     COMPARISON: None    HISTORY: Slurred speech. Right facial droop.    TECHNIQUE: 5 mm thick axial CT images of the head were acquired  without IV contrast material.    FINDINGS: There is mild diffuse cerebral volume loss. There are subtle  patchy areas of decreased density in the cerebral white matter  bilaterally that are consistent with sequela of chronic small-vessel  ischemic disease.    The ventricles and basal cisterns are within normal limits in  configuration given the degree of cerebral volume loss.  There is no  midline shift. There are no extra-axial fluid collections.    No intracranial hemorrhage, mass or recent infarct.    The visualized paranasal sinuses are well-aerated. There is no  mastoiditis. There are no fractures of the visualized bones.       Impression    IMPRESSION:  Diffuse cerebral volume loss and cerebral white matter  changes consistent with chronic small-vessel ischemic disease. No  evidence for acute  intracranial pathology.      Radiation dose for this scan was reduced using automated exposure  control, adjustment of the mA and/or kV according to patient size, or  iterative reconstruction technique.    SAHRA CALIX MD   CTA Head Neck with Contrast    Narrative    CT ANGIOGRAM OF THE HEAD AND NECK WITHOUT AND WITH CONTRAST  4/8/2021  3:40 PM     COMPARISON: None    HISTORY: Slurred speech. Right facial droop.    TECHNIQUE:  Precontrast localizing scans were followed by CT  angiography with an injection of 70 mL Isovue-370 nonionic intravenous  contrast material with scans through the head and neck.  Images were  transferred to a separate 3-D workstation where multiplanar  reformations and 3-D images were created.  Estimates of carotid  stenoses are made relative to the distal internal carotid artery  diameters except as noted.      FINDINGS:   Neck CTA: There is a normal variant aberrant right subclavian artery  passing posterior to the esophagus. The bilateral common carotid,  bilateral cervical internal carotid and bilateral vertebral arteries  are patent without stenosis.     Head CTA: There is probable occlusion of an inferior division branch  of the left middle cerebral artery at the M2 M3 junction in the  posterior aspect of the left sylvian fissure (series 9, image 453). No  other occluded vessels identified. The left middle cerebral artery and  its branches are otherwise within normal limits. The basilar,  bilateral intracranial distal internal carotid, bilateral anterior  cerebral, right middle cerebral and bilateral posterior cerebral  arteries are patent and unremarkable. The anterior communicating and  bilateral posterior communicating arteries are patent.      Impression    IMPRESSION:  1. 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  2. Otherwise, normal neck and head CTA.    This imaging study was discussed with the  ordering physician, Dr. GREGORIO MARTINEZ, by Dr. Calix on 2021 4:00 PM.    Radiation dose for this scan was reduced using automated exposure  control, adjustment of the mA and/or kV according to patient size, or  iterative reconstruction technique    SAHRA CALIX MD   MR Brain w/o & w Contrast    Narrative    MRI OF THE BRAIN WITHOUT AND WITH CONTRAST 2021 6:10 PM     COMPARISON: Head CT same day.    HISTORY:  Facial droop. Slurred speech.     TECHNIQUE: Axial diffusion-weighted with ADC map, axial T2-weighted  with fat saturation, axial T1-weighted, axial turboFLAIR and coronal  T1-weighted images of the brain were acquired without intravenous  contrast.  Following intravenous administration of gadolinium (10 mL  Gadavist), axial T1-weighted images of the brain were acquired.     FINDINGS: There is moderate diffuse cerebral volume loss. There are a  few tiny scattered focal areas of abnormal T2 signal hyperintensity in  the cerebral white matter bilaterally that are consistent with sequela  of chronic small vessel ischemic disease.    The ventricles and basal cisterns are within normal limits in  configuration given the degree of cerebral volume loss.  There is no  midline shift.  There are no extra-axial fluid collections.  There is  no evidence for stroke or acute intracranial hemorrhage.  There is no  abnormal contrast enhancement in the brain or its coverings.    There is no sinusitis or mastoiditis.      Impression    IMPRESSION: Diffuse cerebral volume loss and cerebral white matter  changes consistent with chronic small vessel ischemic disease. No  evidence for acute intracranial pathology.     SAHRA CALIX MD   Echocardiogram Complete - For age > 60 yrs    Narrative    [id number]  [id number]  [id number]  2010^SILVANA^TRISTIAN^A     [facility]  Echocardiography Laboratory  [address] [place] [address]     Name: MATT CEDENO  MRN: [id number]  : 1945  Study Date:  04/09/2021 09:44 AM  Age: 76 yrs  Gender: Female  Patient Location: [facility]  Reason For Study: Cerebrovascular Incident  Ordering Physician: TRISTIAN PINA  Referring Physician: TRISTIAN PINA  Performed By: [facility] Jennifer Orlando     BSA: 2.0 m2  Height: 67 in  Weight: 199 lb  HR: 63  BP: 109/69 mmHg  ______________________________________________________________________________  Procedure  Complete Portable Echo Adult.  ______________________________________________________________________________  Interpretation Summary     The left ventricle is normal in size.  The visual ejection fraction is estimated at 60-65%.  Grade I or early diastolic dysfunction.  No regional wall motion abnormalities noted.  No significant valvular heart disease.  ______________________________________________________________________________  Left Ventricle  The left ventricle is normal in size. There is normal left ventricular wall  thickness. The visual ejection fraction is estimated at 60-65%. Grade I or  early diastolic dysfunction. No regional wall motion abnormalities noted.     Right Ventricle  The right ventricle is normal in size and function.     Atria  The left atrium is moderately dilated. Right atrial size is normal. There is  no color Doppler evidence of an atrial shunt.     Mitral Valve  The mitral valve leaflets are mildly thickened. There is trace mitral  regurgitation.     Tricuspid Valve  There is trace tricuspid regurgitation. Right ventricular systolic pressure  could not be approximated due to inadequate tricuspid regurgitation. IVC  diameter and respiratory changes fall into an intermediate range suggesting an  RA pressure of 8 mmHg.     Aortic Valve  There is trivial trileaflet aortic sclerosis. No aortic regurgitation is  present.     Pulmonic Valve  There is trace to mild pulmonic valvular regurgitation.     Vessels  Normal size aorta. The aortic root is normal size.     Pericardium  There is no pericardial  effusion.     Rhythm  Sinus rhythm was noted.  ______________________________________________________________________________  MMode/2D Measurements & Calculations  IVSd: 0.78 cm     LVIDd: 4.6 cm  LVIDs: 2.7 cm  LVPWd: 0.95 cm  FS: 40.6 %  LV mass(C)d: 131.5 grams  LV mass(C)dI: 65.2 grams/m2  Ao root diam: 3.2 cm  asc Aorta Diam: 3.3 cm  LVOT diam: 1.9 cm  LVOT area: 2.8 cm2  LA Volume Indexed (AL/bp): 28.6 ml/m2  RWT: 0.41  TAPSE: 2.3 cm     Doppler Measurements & Calculations  MV E max adenike: 52.0 cm/sec  MV A max adenike: 64.9 cm/sec  MV E/A: 0.80  MV dec time: 0.19 sec  E/E' av.8  Lateral E/e': 4.3  Medial E/e': 5.2     ______________________________________________________________________________  Report approved by: Sara Flores 2021 12:21 PM

## 2021-04-19 ENCOUNTER — PATIENT OUTREACH (OUTPATIENT)
Dept: CARE COORDINATION | Facility: CLINIC | Age: 76
End: 2021-04-19

## 2021-04-19 DIAGNOSIS — Z71.89 OTHER SPECIFIED COUNSELING: ICD-10-CM

## 2021-04-19 NOTE — PROGRESS NOTES
Clinic Care Coordination Contact  [facility]/Voicemail       Clinical Data: Care Coordinator Outreach  Outreach attempted x 1.  Left message on patient's voicemail with call back information and requested return call.  Plan: are Coordinator will try to reach patient again in 1-2 business days.

## 2021-04-19 NOTE — PROGRESS NOTES
Clinic Care Coordination Contact  [facility]/Voicemail       Clinical Data: Care Coordinator Outreach  Outreach attempted x 1.  Left message on patient's voicemail with call back information and requested return call.  Plan: Care Coordinator will try to reach patient again in 1-2 business days.      Patient returned my call and left a message. I left a messaged with a return call today

## 2021-04-21 ENCOUNTER — OFFICE VISIT (OUTPATIENT)
Dept: FAMILY MEDICINE | Facility: CLINIC | Age: 76
End: 2021-04-21
Payer: MEDICARE

## 2021-04-21 VITALS
RESPIRATION RATE: 16 BRPM | HEIGHT: 67 IN | TEMPERATURE: 98.3 F | OXYGEN SATURATION: 98 % | DIASTOLIC BLOOD PRESSURE: 63 MMHG | BODY MASS INDEX: 31.61 KG/M2 | SYSTOLIC BLOOD PRESSURE: 93 MMHG | WEIGHT: 201.4 LBS | HEART RATE: 57 BPM

## 2021-04-21 DIAGNOSIS — N28.9 DECREASED RENAL FUNCTION: ICD-10-CM

## 2021-04-21 DIAGNOSIS — K29.61 GASTROINTESTINAL HEMORRHAGE ASSOCIATED WITH OTHER GASTRITIS: Primary | ICD-10-CM

## 2021-04-21 LAB
ANION GAP SERPL CALCULATED.3IONS-SCNC: 5 MMOL/L (ref 3–14)
BUN SERPL-MCNC: 31 MG/DL (ref 7–30)
CALCIUM SERPL-MCNC: 8.6 MG/DL (ref 8.5–10.1)
CHLORIDE SERPL-SCNC: 112 MMOL/L (ref 94–109)
CO2 SERPL-SCNC: 25 MMOL/L (ref 20–32)
CREAT SERPL-MCNC: 1.52 MG/DL (ref 0.52–1.04)
ERYTHROCYTE [DISTWIDTH] IN BLOOD BY AUTOMATED COUNT: 14.9 % (ref 10–15)
GFR SERPL CREATININE-BSD FRML MDRD: 33 ML/MIN/{1.73_M2}
GLUCOSE SERPL-MCNC: 105 MG/DL (ref 70–99)
HCT VFR BLD AUTO: 34.7 % (ref 35–47)
HGB BLD-MCNC: 11.1 G/DL (ref 11.7–15.7)
MCH RBC QN AUTO: 29.8 PG (ref 26.5–33)
MCHC RBC AUTO-ENTMCNC: 32 G/DL (ref 31.5–36.5)
MCV RBC AUTO: 93 FL (ref 78–100)
PLATELET # BLD AUTO: 250 10E9/L (ref 150–450)
POTASSIUM SERPL-SCNC: 4.4 MMOL/L (ref 3.4–5.3)
RBC # BLD AUTO: 3.72 10E12/L (ref 3.8–5.2)
SODIUM SERPL-SCNC: 142 MMOL/L (ref 133–144)
WBC # BLD AUTO: 5.6 10E9/L (ref 4–11)

## 2021-04-21 PROCEDURE — 85027 COMPLETE CBC AUTOMATED: CPT | Performed by: PHYSICIAN ASSISTANT

## 2021-04-21 PROCEDURE — 36415 COLL VENOUS BLD VENIPUNCTURE: CPT | Performed by: PHYSICIAN ASSISTANT

## 2021-04-21 PROCEDURE — 80048 BASIC METABOLIC PNL TOTAL CA: CPT | Performed by: PHYSICIAN ASSISTANT

## 2021-04-21 PROCEDURE — 99214 OFFICE O/P EST MOD 30 MIN: CPT | Performed by: PHYSICIAN ASSISTANT

## 2021-04-21 ASSESSMENT — MIFFLIN-ST. JEOR: SCORE: 1446.17

## 2021-04-21 NOTE — PROGRESS NOTES
"    Assessment & Plan     Gastrointestinal hemorrhage associated with other gastritis  Coming back up now   Lab Results   Component Value Date    WBC 5.6 04/21/2021     Lab Results   Component Value Date    RBC 3.72 04/21/2021     Lab Results   Component Value Date    HGB 11.1 04/21/2021     Lab Results   Component Value Date    HCT 34.7 04/21/2021     No components found for: MCT      - CBC with platelets    Decreased renal function  Well recheck kidney function.  Was taken off lisinopril because it dropped during hospitalization.   - Basic metabolic panel      BMI:   Estimated body mass index is 31.54 kg/m  as calculated from the following:    Height as of this encounter: 1.702 m (5' 7\").    Weight as of this encounter: 91.4 kg (201 lb 6.4 oz).             Ramona Ann Aaseby-Aguilera, PA-C  [facility] GABI Evangelista is a 76 year old who presents for the following health issues     History of Present Illness   She consumes 3 sweetened beverage(s) daily.She exercises with enough effort to increase her heart rate 9 or less minutes per day.  She exercises with enough effort to increase her heart rate 3 or less days per week.   She is taking medications regularly.         Hospital Follow-up Visit:    Hospital/Nursing Home/IP Rehab Facility: [facility]  Date of Admission: April 15  Date of Discharge: April 16  Reason(s) for Admission:  Gastrointestinal hemorrhage      Was your hospitalization related to COVID-19? No   Problems taking medications regularly:  Plavix - caused dark stools   Medication changes since discharge: stopped lisinopril  Problems adhering to non-medication therapy:      Summary of hospitalization:  [facility] discharge summary reviewed  Diagnostic Tests/Treatments reviewed.  Follow up needed: check hemoglobin   Other Healthcare Providers Involved in Patient s Care:         Specialist appointment - gastro for colonoscopy   Update since discharge: " "improved.   Post Discharge Medication Reconciliation: discharge medications reconciled, continue medications without change.  Plan of care communicated with patient              New Patient/Transfer of Care    Review of Systems   Constitutional, HEENT, cardiovascular, pulmonary, GI, , musculoskeletal, neuro, skin, endocrine and psych systems are negative, except as otherwise noted.      Objective    BP 93/63 (BP Location: Right arm, Patient Position: Chair, Cuff Size: Adult Large)   Pulse 57   Temp 98.3  F (36.8  C) (Oral)   Resp 16   Ht 1.702 m (5' 7\")   Wt 91.4 kg (201 lb 6.4 oz)   SpO2 98%   BMI 31.54 kg/m    Body mass index is 31.54 kg/m .  Physical Exam   GENERAL: healthy, alert and no distress  HENT: ear canals and TM's normal, nose and mouth without ulcers or lesions  RESP: lungs clear to auscultation - no rales, rhonchi or wheezes  CV: regular rate and rhythm, normal S1 S2, no S3 or S4, no murmur, click or rub, no peripheral edema and peripheral pulses strong  ABDOMEN: soft, nontender, no hepatosplenomegaly, no masses and bowel sounds normal  NEURO: Normal strength and tone, mentation intact and speech normal  PSYCH: mentation appears normal, affect normal/bright                "

## 2021-04-23 ENCOUNTER — MYC MEDICAL ADVICE (OUTPATIENT)
Dept: FAMILY MEDICINE | Facility: CLINIC | Age: 76
End: 2021-04-23

## 2021-04-23 DIAGNOSIS — K92.2 GASTROINTESTINAL HEMORRHAGE, UNSPECIFIED GASTROINTESTINAL HEMORRHAGE TYPE: ICD-10-CM

## 2021-04-23 DIAGNOSIS — I63.9 CEREBROVASCULAR ACCIDENT (CVA), UNSPECIFIED MECHANISM (H): ICD-10-CM

## 2021-04-23 RX ORDER — ATORVASTATIN CALCIUM 40 MG/1
40 TABLET, FILM COATED ORAL EVERY EVENING
Qty: 30 TABLET | Refills: 0 | Status: SHIPPED | OUTPATIENT
Start: 2021-04-23 | End: 2021-05-19

## 2021-04-23 RX ORDER — OMEPRAZOLE 20 MG/1
40 TABLET, DELAYED RELEASE ORAL DAILY
Qty: 28 TABLET | Refills: 0 | Status: SHIPPED | OUTPATIENT
Start: 2021-04-23 | End: 2021-06-24

## 2021-04-23 RX ORDER — CLOPIDOGREL BISULFATE 75 MG/1
75 TABLET ORAL DAILY
Qty: 21 TABLET | Refills: 0 | Status: SHIPPED | OUTPATIENT
Start: 2021-04-23 | End: 2021-05-19

## 2021-04-23 NOTE — TELEPHONE ENCOUNTER
Please see patient [employer] message requesting refills on omeprazole, atorvastatin, and clopidogrel. Patient had OV for hospital f/u 4/21/21 regarding recent hospitalization for GI hemorrhage. Medications not previously prescribed by PCP, please advise if refills needed.     Kashmir DOWLING RN

## 2021-05-04 ENCOUNTER — MYC MEDICAL ADVICE (OUTPATIENT)
Dept: FAMILY MEDICINE | Facility: CLINIC | Age: 76
End: 2021-05-04

## 2021-05-04 NOTE — TELEPHONE ENCOUNTER
"Routing to Ramona Ann Aaseby-Aguilera, PA-C,    Please see patient mychart message regarding Omeprazole, patient prescribed at 4/16 hospitalization     \"  omeprazole (PRILOSEC OTC) 20 MG EC tablet Take 2 tablets (40 mg) by mouth daily  Qty: 28 tablet, Refills: 0     Associated Diagnoses: Gastrointestinal hemorrhage, unspecified gastrointestinal hemorrhage type     \"      Please advise if patient is to continue taking, was refilled 4/23/2021    Sunday Fortune RN   "

## 2021-05-13 NOTE — PROGRESS NOTES
ESTABLISHED PATIENT NEUROLOGY NOTE    DATE OF VISIT: 5/14/2021  CLINIC LOCATION: [facility]  MRN: [id number]  PATIENT NAME: Tonja Nieves  YOB: 1945    REASON FOR VISIT:   Chief Complaint   Patient presents with     Follow Up     TIA     SUBJECTIVE:                                                      HISTORY OF PRESENT ILLNESS: Patient is new to me, but was previously seen by stroke neurology team.  History and prior evaluation are briefly summarized below, but please see previous neurology notes for more detailed information.  The patient is accompanied by her daughter, who participates in the interview today.    Per chart review, the patient has history of hypothyroidism, chronic kidney disease, stage III, and urge incontinence.  On 4/8/2021 she presented to [facility] with transient episode of dysarthria, right facial droop, bilateral lower extremity and right arm weakness.  Symptoms resolved by the time she presented to emergency department.    Initial Head CT was negative for acute intracranial pathology, but demonstrated diffuse cerebral volume loss and cerebral white matter changes consistent with chronic small vessel ischemic disease.  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  Brain MRI with and without contrast was negative for acute stroke, though demonstrated consistent with prior head CT findings.  All images were personally reviewed and independently interpreted.    Additional TIA work-up included TTE, which demonstrated mildly dilated left atrium, but no evidence of intracardiac thrombus or PFO.  Hemoglobin A1C was 5.6 and LDL was 110.  The patient was advised to complete 30-day cardiac event monitoring (occasional PAC's/PVC's, brief atrial tachycardia runs, but no atrial fibrillation).  The patient was  "discharged on dual antiplatelet therapy for 3 weeks followed by 325 mg of aspirin indefinitely and atorvastatin 40 mg daily.    Several days later, on 4/15/2021, she returned back to the [facility] with melena.  At that time her Plavix was continued, but aspirin held.    At the present time, the patient denies recurrence of her neurological symptoms or GI bleeding.  She is on Plavix monotherapy and atorvastatin without noticeable side effects.    On review of systems, patient endorses recent weight gain, thyroid problems, limb paresthesias, and urinary incontinence.  All of them have been previously discussed with other medical providers. Medications, allergies, family and social history were also reviewed. There are no changes reported by patient.  REVIEW OF SYSTEMS:                                                    10-system review was completed. Pertinent positives are included in HPI. The remainder of ROS is negative.  EXAM:                                                    Physical Exam:   Vitals: BP (!) 156/73   Pulse (!) 43   Ht 1.702 m (5' 7\")   Wt 90.7 kg (200 lb)   SpO2 97%   BMI 31.32 kg/m    Repeat vitals: /84   Pulse 60   Ht 1.702 m (5' 7\")   Wt 90.7 kg (200 lb)   SpO2 (!) 84%   BMI 31.32 kg/m  .  General: pt is in NAD, cooperative.  Skin: normal turgor, moist mucous membranes, no lesions/rashes noticed.  HEENT: ATNC, white sclera, normal conjunctiva.  Respiratory: Symmetric lung excursion, no accessory respiratory muscle use.  Abdomen: Non distended.  Neurological: awake, cooperative, follows commands, no aphasia or dysarthria noted, cranial nerves II-XII: Funduscopic exam is normal bilaterally, no ptosis, extraocular motility is full, face is symmetric, tongue is midline, equally moves all extremities, strength is 5/5 bilaterally in upper and lower extremities, no pronator drift, deep tendon reflexes are equal bilaterally (except achilles reflexes that are absent " bilaterally), sensation to the light touch, vibration, and pinprick is intact bilaterally, finger to nose and heel-knee-shin tests are intact bilaterally, casual gait is normal, but has mild difficulty with tandem walk.  ASSESSMENT AND PLAN:                                                    Assessment: 76 year old female patient presents for a follow-up of recent TIA in April 2021.  She was not on any antiplatelet therapy prior to it and was placed on dual antiplatelet therapy for 21 days with the plans to switch to 325 mg of aspirin thereafter, but sustained GI bleed, and her aspirin was stopped.  Her cardiac monitoring was negative for atrial fibrillation.  The rest of her work-up is completed.  We reviewed images together and discussed that most reasonably for her would be to stay on Plavix indefinitely, though I would like to check her platelet function to assure that Plavix provides adequate antiplatelet activity.  If not, we should consider switching her to at least 81 mg of aspirin daily, as she is hesitant to go back on full dose of 325 mg daily.    Diagnoses:    ICD-10-CM    1. History of transient ischemic attack (TIA)  Z86.73      Plan: At today's visit we thoroughly discussed current symptoms, evaluation results, including imaging, additionally needed evaluation, and the plan, which includes:  Orders Placed This Encounter   Procedures     Platelet Function P2Y12     Symptoms and signs of stroke were discussed.  The patient clearly understands to call 911 with any SUDDEN onset of weakness, vision loss, speech problems, numbness, or severe headache of unknown cause.    For secondary stroke prevention, I counseled the patient to:  -Continue Plavix for now until we check platelet function.  This medication might need to be changed based on results (I will contact the patient once I receive them).  -Continue atorvastatin lifelong and recheck LDL (lipid fasting panel) in 1 to 2 months with her primary care  provider  -Long-term blood pressure goal is less than 130/80  -Long-term LDL goal is 40-70  -Long-term goal of hemoglobin A1C is less than 7.0  -Low-salt low-fat diet  -Regular aerobic exercise 30 minutes 3-4 times per week    Next follow-up appointment is on as needed basis.    Total Time: 69 minutes spent on the date of the encounter doing chart review, history and exam, documentation and further activities per the note.    Tae Mosquera MD  [facility]  (Chart documentation was completed in part with Dragon voice-recognition software. Even though reviewed, some grammatical, spelling, and word errors may remain.)

## 2021-05-14 ENCOUNTER — MYC MEDICAL ADVICE (OUTPATIENT)
Dept: FAMILY MEDICINE | Facility: CLINIC | Age: 76
End: 2021-05-14

## 2021-05-14 ENCOUNTER — OFFICE VISIT (OUTPATIENT)
Dept: NEUROLOGY | Facility: CLINIC | Age: 76
End: 2021-05-14
Attending: PSYCHIATRY & NEUROLOGY
Payer: MEDICARE

## 2021-05-14 VITALS
HEART RATE: 60 BPM | BODY MASS INDEX: 31.39 KG/M2 | DIASTOLIC BLOOD PRESSURE: 84 MMHG | OXYGEN SATURATION: 84 % | HEIGHT: 67 IN | SYSTOLIC BLOOD PRESSURE: 125 MMHG | WEIGHT: 200 LBS

## 2021-05-14 DIAGNOSIS — I63.9 CEREBROVASCULAR ACCIDENT (CVA), UNSPECIFIED MECHANISM (H): ICD-10-CM

## 2021-05-14 DIAGNOSIS — Z86.73 HISTORY OF TRANSIENT ISCHEMIC ATTACK (TIA): Primary | ICD-10-CM

## 2021-05-14 DIAGNOSIS — N28.9 DECREASED RENAL FUNCTION: Primary | ICD-10-CM

## 2021-05-14 PROCEDURE — 99417 PROLNG OP E/M EACH 15 MIN: CPT | Performed by: PSYCHIATRY & NEUROLOGY

## 2021-05-14 PROCEDURE — 99215 OFFICE O/P EST HI 40 MIN: CPT | Performed by: PSYCHIATRY & NEUROLOGY

## 2021-05-14 PROCEDURE — G0463 HOSPITAL OUTPT CLINIC VISIT: HCPCS

## 2021-05-14 ASSESSMENT — MIFFLIN-ST. JEOR
SCORE: 1439.82
SCORE: 1439.82

## 2021-05-14 NOTE — PATIENT INSTRUCTIONS
AFTER VISIT SUMMARY (AVS):    At today's visit we thoroughly discussed current symptoms, evaluation results, including imaging, additionally needed evaluation, and the plan, which includes:  Orders Placed This Encounter   Procedures     Platelet Function P2Y12     Symptoms and signs of stroke were discussed.  You should call 911 with any SUDDEN onset of weakness, vision loss, speech problems, numbness, or severe headache of unknown cause.  For secondary stroke prevention:  -Continue Plavix for now until we check platelet function.  This medication might need to be changed based on results.  -Continue atorvastatin lifelong and recheck LDL (lipid fasting panel) in 1 to 2 months with your primary care provider  -Long-term blood pressure goal is less than 130/80  -Long-term LDL goal is 40-70  -Long-term goal of hemoglobin A1C is less than 7.0  -Low-salt low-fat diet  -Regular aerobic exercise 30 minutes 3-4 times per week    Next follow-up appointment is on as needed basis.    Please do not hesitate to call me with any questions or concerns.    Thanks.

## 2021-05-14 NOTE — TELEPHONE ENCOUNTER
"Can you find out what neurology wants.  When reviewing his note he wants to check platelet \"function\"   .what dose that mean? .   Tonja understood that neurology want us to do this???  Usually Neuro orders these special  tests themselves so confused   "

## 2021-05-14 NOTE — NURSING NOTE
"May 14, 2021 1:03 PM   Tonja Nieves is a 76 year old female who presents for:    Chief Complaint   Patient presents with     Follow Up     TIA     Initial Vitals: BP (!) 156/73   Pulse (!) 43   Ht 5' 7\" (1.702 m)   Wt 200 lb (90.7 kg)   SpO2 97%   BMI 31.32 kg/m   Estimated body mass index is 31.32 kg/m  as calculated from the following:    Height as of this encounter: 5' 7\" (1.702 m).    Weight as of this encounter: 200 lb (90.7 kg). Body surface area is 2.07 meters squared.  Data Unavailable Comment: Data Unavailable       Clinical concerns: Tonja Nieves is here today for TIA follow-up.    Jason Salter MA  "

## 2021-05-14 NOTE — LETTER
5/14/2021         RE: Tonja Nieves  [address] [id number]        Dear Colleague,    Thank you for referring your patient, Tonja Nieves, to the [facility] [place]. Please see a copy of my visit note below.    ESTABLISHED PATIENT NEUROLOGY NOTE    DATE OF VISIT: 5/14/2021  CLINIC LOCATION: [facility]  MRN: [id number]  PATIENT NAME: Tonja Nieves  YOB: 1945    REASON FOR VISIT:   Chief Complaint   Patient presents with     Follow Up     TIA     SUBJECTIVE:                                                      HISTORY OF PRESENT ILLNESS: Patient is new to me, but was previously seen by stroke neurology team.  History and prior evaluation are briefly summarized below, but please see previous neurology notes for more detailed information.  The patient is accompanied by her daughter, who participates in the interview today.    Per chart review, the patient has history of hypothyroidism, chronic kidney disease, stage III, and urge incontinence.  On 4/8/2021 she presented to [facility] with transient episode of dysarthria, right facial droop, bilateral lower extremity and right arm weakness.  Symptoms resolved by the time she presented to emergency department.    Initial Head CT was negative for acute intracranial pathology, but demonstrated diffuse cerebral volume loss and cerebral white matter changes consistent with chronic small vessel ischemic disease.  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  Brain MRI with and without contrast was negative for acute stroke, though demonstrated consistent with prior head CT findings.  All images were personally reviewed and independently interpreted.    Additional TIA work-up included TTE, which demonstrated mildly dilated left atrium, but no  "evidence of intracardiac thrombus or PFO.  Hemoglobin A1C was 5.6 and LDL was 110.  The patient was advised to complete 30-day cardiac event monitoring (occasional PAC's/PVC's, brief atrial tachycardia runs, but no atrial fibrillation).  The patient was discharged on dual antiplatelet therapy for 3 weeks followed by 325 mg of aspirin indefinitely and atorvastatin 40 mg daily.    Several days later, on 4/15/2021, she returned back to the [facility] with melena.  At that time her Plavix was continued, but aspirin held.    At the present time, the patient denies recurrence of her neurological symptoms or GI bleeding.  She is on Plavix monotherapy and atorvastatin without noticeable side effects.    On review of systems, patient endorses recent weight gain, thyroid problems, limb paresthesias, and urinary incontinence.  All of them have been previously discussed with other medical providers. Medications, allergies, family and social history were also reviewed. There are no changes reported by patient.  REVIEW OF SYSTEMS:                                                    10-system review was completed. Pertinent positives are included in HPI. The remainder of ROS is negative.  EXAM:                                                    Physical Exam:   Vitals: BP (!) 156/73   Pulse (!) 43   Ht 1.702 m (5' 7\")   Wt 90.7 kg (200 lb)   SpO2 97%   BMI 31.32 kg/m    Repeat vitals: /84   Pulse 60   Ht 1.702 m (5' 7\")   Wt 90.7 kg (200 lb)   SpO2 (!) 84%   BMI 31.32 kg/m  .  General: pt is in NAD, cooperative.  Skin: normal turgor, moist mucous membranes, no lesions/rashes noticed.  HEENT: ATNC, white sclera, normal conjunctiva.  Respiratory: Symmetric lung excursion, no accessory respiratory muscle use.  Abdomen: Non distended.  Neurological: awake, cooperative, follows commands, no aphasia or dysarthria noted, cranial nerves II-XII: Funduscopic exam is normal bilaterally, no ptosis, extraocular motility " is full, face is symmetric, tongue is midline, equally moves all extremities, strength is 5/5 bilaterally in upper and lower extremities, no pronator drift, deep tendon reflexes are equal bilaterally (except achilles reflexes that are absent bilaterally), sensation to the light touch, vibration, and pinprick is intact bilaterally, finger to nose and heel-knee-shin tests are intact bilaterally, casual gait is normal, but has mild difficulty with tandem walk.  ASSESSMENT AND PLAN:                                                    Assessment: 76 year old female patient presents for a follow-up of recent TIA in April 2021.  She was not on any antiplatelet therapy prior to it and was placed on dual antiplatelet therapy for 21 days with the plans to switch to 325 mg of aspirin thereafter, but sustained GI bleed, and her aspirin was stopped.  Her cardiac monitoring was negative for atrial fibrillation.  The rest of her work-up is completed.  We reviewed images together and discussed that most reasonably for her would be to stay on Plavix indefinitely, though I would like to check her platelet function to assure that Plavix provides adequate antiplatelet activity.  If not, we should consider switching her to at least 81 mg of aspirin daily, as she is hesitant to go back on full dose of 325 mg daily.    Diagnoses:    ICD-10-CM    1. History of transient ischemic attack (TIA)  Z86.73      Plan: At today's visit we thoroughly discussed current symptoms, evaluation results, including imaging, additionally needed evaluation, and the plan, which includes:  Orders Placed This Encounter   Procedures     Platelet Function P2Y12     Symptoms and signs of stroke were discussed.  The patient clearly understands to call 911 with any SUDDEN onset of weakness, vision loss, speech problems, numbness, or severe headache of unknown cause.    For secondary stroke prevention, I counseled the patient to:  -Continue Plavix for now until we  check platelet function.  This medication might need to be changed based on results (I will contact the patient once I receive them).  -Continue atorvastatin lifelong and recheck LDL (lipid fasting panel) in 1 to 2 months with her primary care provider  -Long-term blood pressure goal is less than 130/80  -Long-term LDL goal is 40-70  -Long-term goal of hemoglobin A1C is less than 7.0  -Low-salt low-fat diet  -Regular aerobic exercise 30 minutes 3-4 times per week    Next follow-up appointment is on as needed basis.    Total Time: 69 minutes spent on the date of the encounter doing chart review, history and exam, documentation and further activities per the note.    Tae Mosquera MD  [facility]  (Chart documentation was completed in part with Dragon voice-recognition software. Even though reviewed, some grammatical, spelling, and word errors may remain.)       Again, thank you for allowing me to participate in the care of your patient.        Sincerely,        Tae Mosquera MD

## 2021-05-18 ENCOUNTER — MYC MEDICAL ADVICE (OUTPATIENT)
Dept: FAMILY MEDICINE | Facility: CLINIC | Age: 76
End: 2021-05-18

## 2021-05-18 DIAGNOSIS — N28.9 DECREASED RENAL FUNCTION: ICD-10-CM

## 2021-05-18 LAB
ALBUMIN SERPL-MCNC: 3.6 G/DL (ref 3.4–5)
ALP SERPL-CCNC: 90 U/L (ref 40–150)
ALT SERPL W P-5'-P-CCNC: 102 U/L (ref 0–50)
ANION GAP SERPL CALCULATED.3IONS-SCNC: 5 MMOL/L (ref 3–14)
AST SERPL W P-5'-P-CCNC: 45 U/L (ref 0–45)
BILIRUB SERPL-MCNC: 0.6 MG/DL (ref 0.2–1.3)
BUN SERPL-MCNC: 24 MG/DL (ref 7–30)
CALCIUM SERPL-MCNC: 8.8 MG/DL (ref 8.5–10.1)
CHLORIDE SERPL-SCNC: 110 MMOL/L (ref 94–109)
CO2 SERPL-SCNC: 25 MMOL/L (ref 20–32)
CREAT SERPL-MCNC: 1.36 MG/DL (ref 0.52–1.04)
GFR SERPL CREATININE-BSD FRML MDRD: 38 ML/MIN/{1.73_M2}
GLUCOSE SERPL-MCNC: 94 MG/DL (ref 70–99)
POTASSIUM SERPL-SCNC: 4.7 MMOL/L (ref 3.4–5.3)
PROT SERPL-MCNC: 7.4 G/DL (ref 6.8–8.8)
SODIUM SERPL-SCNC: 140 MMOL/L (ref 133–144)

## 2021-05-18 PROCEDURE — 36415 COLL VENOUS BLD VENIPUNCTURE: CPT | Performed by: PHYSICIAN ASSISTANT

## 2021-05-18 PROCEDURE — 80053 COMPREHEN METABOLIC PANEL: CPT | Performed by: PHYSICIAN ASSISTANT

## 2021-05-18 NOTE — TELEPHONE ENCOUNTER
Routing to [facility],    Please see patient mychart message, appears to be order for platelet function P2Y12 from outside provider, can this be added with sample that was given today?    Please advise  Sunday Fortune RN

## 2021-05-19 ENCOUNTER — MYC MEDICAL ADVICE (OUTPATIENT)
Dept: FAMILY MEDICINE | Facility: CLINIC | Age: 76
End: 2021-05-19

## 2021-05-19 ENCOUNTER — MYC REFILL (OUTPATIENT)
Dept: FAMILY MEDICINE | Facility: CLINIC | Age: 76
End: 2021-05-19

## 2021-05-19 DIAGNOSIS — I63.9 CEREBROVASCULAR ACCIDENT (CVA), UNSPECIFIED MECHANISM (H): ICD-10-CM

## 2021-05-19 RX ORDER — ATORVASTATIN CALCIUM 40 MG/1
40 TABLET, FILM COATED ORAL EVERY EVENING
Qty: 90 TABLET | Refills: 2 | Status: SHIPPED | OUTPATIENT
Start: 2021-05-19 | End: 2021-11-30

## 2021-05-19 NOTE — TELEPHONE ENCOUNTER
"Called patient and informed after huddling with [facility] lab,      \"Platelet function P2Y12\" needs to be completed at [facility], as a walk in appointment, hours are 7a-7p    Sunday Fortune RN   "

## 2021-05-19 NOTE — TELEPHONE ENCOUNTER
Routing refill request to provider for review/approval because:  Needs normal Hgb    Jacqui Huynh RN

## 2021-05-20 RX ORDER — CLOPIDOGREL BISULFATE 75 MG/1
75 TABLET ORAL DAILY
Qty: 21 TABLET | Refills: 0 | Status: SHIPPED | OUTPATIENT
Start: 2021-05-20 | End: 2021-06-09

## 2021-05-24 ENCOUNTER — MYC MEDICAL ADVICE (OUTPATIENT)
Dept: NEUROLOGY | Facility: CLINIC | Age: 76
End: 2021-05-24

## 2021-05-24 ENCOUNTER — HOSPITAL ENCOUNTER (OUTPATIENT)
Dept: LAB | Facility: CLINIC | Age: 76
Discharge: HOME | End: 2021-05-24
Attending: PSYCHIATRY & NEUROLOGY | Admitting: PSYCHIATRY & NEUROLOGY
Payer: MEDICARE

## 2021-05-24 ENCOUNTER — TELEPHONE (OUTPATIENT)
Dept: NEUROLOGY | Facility: CLINIC | Age: 76
End: 2021-05-24

## 2021-05-24 DIAGNOSIS — Z86.73 HISTORY OF TRANSIENT ISCHEMIC ATTACK (TIA): ICD-10-CM

## 2021-05-24 LAB — PLATELET REACTIVITY P2Y12: 179 PRU

## 2021-05-24 PROCEDURE — 85576 BLOOD PLATELET AGGREGATION: CPT | Mod: TC | Performed by: PSYCHIATRY & NEUROLOGY

## 2021-05-24 PROCEDURE — 36415 COLL VENOUS BLD VENIPUNCTURE: CPT | Performed by: PSYCHIATRY & NEUROLOGY

## 2021-05-24 NOTE — TELEPHONE ENCOUNTER
Patient needs to know where to get the lab tests drawn that were ordered.  She stated her clinic wasn't able to do the test.

## 2021-05-24 NOTE — TELEPHONE ENCOUNTER
Dr. Mosquera ordered Platelet Function P2Y12. Spoke with [facility] . They not run this test , was told  lab does and was advised to contact  [facility]. Called  lab and confirmed they do this lab test there. It was advised by lab patient call to schedule appt at [phone] option#1.     Attempted to reach out to patient, no answer. Left voice message for patient. Sent her [employer] message with above info. Advised on message to contact clinic if any further questions.

## 2021-05-31 DIAGNOSIS — Z11.59 ENCOUNTER FOR SCREENING FOR OTHER VIRAL DISEASES: ICD-10-CM

## 2021-06-09 ENCOUNTER — MYC MEDICAL ADVICE (OUTPATIENT)
Dept: FAMILY MEDICINE | Facility: CLINIC | Age: 76
End: 2021-06-09

## 2021-06-09 DIAGNOSIS — I63.9 CEREBROVASCULAR ACCIDENT (CVA), UNSPECIFIED MECHANISM (H): ICD-10-CM

## 2021-06-09 RX ORDER — CLOPIDOGREL BISULFATE 75 MG/1
75 TABLET ORAL DAILY
Qty: 90 TABLET | Refills: 0 | Status: SHIPPED | OUTPATIENT
Start: 2021-06-09 | End: 2021-07-29

## 2021-06-18 ENCOUNTER — OFFICE VISIT (OUTPATIENT)
Dept: FAMILY MEDICINE | Facility: CLINIC | Age: 76
End: 2021-06-18
Payer: MEDICARE

## 2021-06-18 VITALS
SYSTOLIC BLOOD PRESSURE: 118 MMHG | BODY MASS INDEX: 31.08 KG/M2 | OXYGEN SATURATION: 98 % | HEART RATE: 55 BPM | DIASTOLIC BLOOD PRESSURE: 66 MMHG | WEIGHT: 198 LBS | RESPIRATION RATE: 16 BRPM | HEIGHT: 67 IN | TEMPERATURE: 98.5 F

## 2021-06-18 DIAGNOSIS — Z01.818 PREOP GENERAL PHYSICAL EXAM: Primary | ICD-10-CM

## 2021-06-18 DIAGNOSIS — N18.32 STAGE 3B CHRONIC KIDNEY DISEASE (H): ICD-10-CM

## 2021-06-18 DIAGNOSIS — K92.2 GASTROINTESTINAL HEMORRHAGE, UNSPECIFIED GASTROINTESTINAL HEMORRHAGE TYPE: ICD-10-CM

## 2021-06-18 LAB
ERYTHROCYTE [DISTWIDTH] IN BLOOD BY AUTOMATED COUNT: 14.9 % (ref 10–15)
HCT VFR BLD AUTO: 35.1 % (ref 35–47)
HGB BLD-MCNC: 11.1 G/DL (ref 11.7–15.7)
MCH RBC QN AUTO: 26.6 PG (ref 26.5–33)
MCHC RBC AUTO-ENTMCNC: 31.6 G/DL (ref 31.5–36.5)
MCV RBC AUTO: 84 FL (ref 78–100)
PLATELET # BLD AUTO: 260 10E9/L (ref 150–450)
RBC # BLD AUTO: 4.17 10E12/L (ref 3.8–5.2)
WBC # BLD AUTO: 5.4 10E9/L (ref 4–11)

## 2021-06-18 PROCEDURE — 80048 BASIC METABOLIC PNL TOTAL CA: CPT | Performed by: PHYSICIAN ASSISTANT

## 2021-06-18 PROCEDURE — 99214 OFFICE O/P EST MOD 30 MIN: CPT | Performed by: PHYSICIAN ASSISTANT

## 2021-06-18 PROCEDURE — 36415 COLL VENOUS BLD VENIPUNCTURE: CPT | Performed by: PHYSICIAN ASSISTANT

## 2021-06-18 PROCEDURE — 85027 COMPLETE CBC AUTOMATED: CPT | Performed by: PHYSICIAN ASSISTANT

## 2021-06-18 ASSESSMENT — MIFFLIN-ST. JEOR: SCORE: 1430.75

## 2021-06-18 NOTE — PROGRESS NOTES
[facility]  [address] [facility] [place] [address]  Phone: [phone]  Primary Provider: Aaseby-Aguilera, Ramona Ann        PREOPERATIVE EVALUATION:  Today's date: 6/18/2021    Tonja Nieves is a 76 year old female who presents for a preoperative evaluation.    Surgical Information:  Surgery/Procedure: colonoscopy  Surgery Location: [facility]  Surgeon: Dr Britton  Surgery Date: 6/28  Time of Surgery: 940am  Where patient plans to recover: At home with family  Fax number for surgical facility: NOT needed    Type of Anesthesia Anticipated: to be determined    Assessment & Plan     The proposed surgical procedure is considered LOW risk.    Preop general physical exam      Stage 3b chronic kidney disease  Stable     Gastrointestinal hemorrhage, unspecified gastrointestinal hemorrhage type  Stable/resolved           Risks and Recommendations:  The patient has the following additional risks and recommendations for perioperative complications:   - No identified additional risk factors other than previously addressed        RECOMMENDATION:  APPROVAL GIVEN to proceed with proposed procedure, without further diagnostic evaluation.                      Subjective     HPI related to upcoming procedure: GI bleed    Preop Questions 6/12/2021   1. Have you ever had a heart attack or stroke? No   2. Have you ever had surgery on your heart or blood vessels, such as a stent placement, a coronary artery bypass, or surgery on an artery in your head, neck, heart, or legs? No   3. Do you have chest pain with activity? No   4. Do you have a history of  heart failure? No   5. Do you currently have a cold, bronchitis or symptoms of other infection? No   6. Do you have a cough, shortness of breath, or wheezing? No   7. Do you or anyone in your family have previous history of blood clots? No   8. Do you or does anyone in your family have a serious bleeding problem such as prolonged bleeding following surgeries or  cuts? No   9. Have you ever had problems with anemia or been told to take iron pills? YES - in the past   10. Have you had any abnormal blood loss such as black, tarry or bloody stools, or abnormal vaginal bleeding? YES - taking plavix and aspirin started it   11. Have you ever had a blood transfusion? No   12. Are you willing to have a blood transfusion if it is medically needed before, during, or after your surgery? Yes   13. Have you or any of your relatives ever had problems with anesthesia? No   14. Do you have sleep apnea, excessive snoring or daytime drowsiness? No   15. Do you have any artifical heart valves or other implanted medical devices like a pacemaker, defibrillator, or continuous glucose monitor? No   16. Do you have artificial joints? YES - 2 hips   17. Are you allergic to latex? No       Health Care Directive:  Patient has a Health Care Directive on file      Preoperative Review of :   reviewed - no record of controlled substances prescribed.      Status of Chronic Conditions:  ANEMIA - Patient has a recent history of moderate-severe anemia, which has not been symptomatic. Work up to date has revealed 11.7. Treatment has been iron supplement.     HYPERLIPIDEMIA - Patient has a long history of significant Hyperlipidemia requiring medication for treatment with recent good control. Patient reports no problems or side effects with the medication.     HYPOTHYROIDISM - Patient has a longstanding history of chronic Hypothyroidism. Patient has been doing well, noting no tremor, insomnia, hair loss or changes in skin texture. Continues to take medications as directed, without adverse reactions or side effects. Last TSH   Lab Results   Component Value Date    TSH 2.04 10/22/2020   .      RENAL INSUFFICIENCY - Patient has a longstanding history of moderate-severe chronic renal insufficiency. Last Cr 1.36.       Review of Systems  Constitutional, neuro, ENT, endocrine, pulmonary, cardiac,  gastrointestinal, genitourinary, musculoskeletal, integument and psychiatric systems are negative, except as otherwise noted.    Patient Active Problem List    Diagnosis Date Noted     Gastrointestinal hemorrhage, unspecified gastrointestinal hemorrhage type 04/15/2021     Priority: Medium     Cerebrovascular accident (CVA), unspecified mechanism (H) 04/08/2021     Priority: Medium     Other specified hypothyroidism 04/26/2016     Priority: Medium     CARDIOVASCULAR SCREENING; LDL GOAL LESS THAN 100 04/11/2013     Priority: Medium     CKD (chronic kidney disease) stage 3, GFR 30-59 ml/min 04/09/2013     Priority: Medium     Urge incontinence of urine 04/08/2013     Priority: Medium     Advanced directives, counseling/discussion 01/10/2013     Priority: Medium     Patient states has Advance Directive and will bring in a copy to clinic. 1/10/2013   Advance Care Planning:   Receipt of ACP document:  Received: Health Care Power of  which was witnessed or notarized on 4/3/2013.  Document not previously scanned.  Validation form completed and sent with document to be scanned.  Code Status reflects choices in most recent ACP document.  Confirmed/documented designated decision maker(s). See permanent comments section of demographics in clinical tab. View document(s) and details by clicking on code status.   Added by Jazlyn David on 4/22/2013.              Past Medical History:   Diagnosis Date     Chronic kidney disease, stage 3, mod decreased GFR      Hypothyroid      TIA (transient ischemic attack) 04/08/2021     Urge incontinence      Past Surgical History:   Procedure Laterality Date     bilateral hip replacement  2006     COLONOSCOPY       ESOPHAGOSCOPY, GASTROSCOPY, DUODENOSCOPY (EGD), COMBINED N/A 4/16/2021    Procedure: ESOPHAGOGASTRODUODENOSCOPY (EGD);  Surgeon: Beck Reyes MD;  Location:  GI     GASTRIC BYPASS  2005    rounx-en-y     TONSILLECTOMY      age 9     Current Outpatient Medications    Medication Sig Dispense Refill     acetaminophen (TYLENOL) 325 MG tablet Take 2 tablets (650 mg) by mouth every 4 hours as needed for mild pain       atorvastatin (LIPITOR) 40 MG tablet 1 tablet (40 mg) by Oral or Feeding Tube route every evening 90 tablet 2     clopidogrel (PLAVIX) 75 MG tablet 1 tablet (75 mg) by Oral or Feeding Tube route daily 90 tablet 0     levothyroxine (SYNTHROID/LEVOTHROID) 50 MCG tablet Take 1 tablet (50 mcg) by mouth daily Take one by mouth daily 90 tablet 3     omeprazole (PRILOSEC OTC) 20 MG EC tablet Take 2 tablets (40 mg) by mouth daily 28 tablet 0     oxybutynin (OXYTROL) 3.9 MG/24HR BIW patch Place 1 patch onto the skin twice a week 8 patch 11       No Known Allergies     Social History     Tobacco Use     Smoking status: Never Smoker     Smokeless tobacco: Never Used   Substance Use Topics     Alcohol use: No     Alcohol/week: 0.0 standard drinks     Family History   Problem Relation Age of Onset     Alzheimer Disease Mother      Hypertension Mother      Diabetes Father      Heart Disease Father      Genetic Disorder Brother 55        ms     Thyroid Disease Sister 60        thyroid      Thyroid Disease Daughter      Diabetes Paternal Grandmother      History   Drug Use No         Objective     There were no vitals taken for this visit.    Physical Exam    GENERAL APPEARANCE: healthy, alert and no distress     EYES: EOMI, PERRL     HENT: ear canals and TM's normal and nose and mouth without ulcers or lesions     NECK: no adenopathy, no asymmetry, masses, or scars and thyroid normal to palpation     RESP: lungs clear to auscultation - no rales, rhonchi or wheezes     CV: regular rates and rhythm, normal S1 S2, no S3 or S4 and no murmur, click or rub     ABDOMEN:  soft, nontender, no HSM or masses and bowel sounds normal     MS: extremities normal- no gross deformities noted, no evidence of inflammation in joints, FROM in all extremities.     SKIN: no suspicious lesions or rashes      NEURO: Normal strength and tone, sensory exam grossly normal, mentation intact and speech normal     PSYCH: mentation appears normal. and affect normal/bright     LYMPHATICS: No cervical adenopathy    Recent Labs   Lab Test 05/18/21  1250 04/21/21  1423 04/16/21  0617 04/15/21  1130 04/09/21  0758   HGB  --  11.1* 10.5* 12.1 12.1   PLT  --  250 214 267 195   INR  --   --   --  1.03  --     142  --  137 140   POTASSIUM 4.7 4.4  --  4.4 4.8   CR 1.36* 1.52*  --  1.46* 1.20*   A1C  --   --   --   --  5.6        Diagnostics:  Labs pending at this time.  Results will be reviewed when available.   Had echo on 4/8/21 EF was 60-65%    Revised Cardiac Risk Index (RCRI):  The patient has the following serious cardiovascular risks for perioperative complications:   - No serious cardiac risks = 0 points     RCRI Interpretation: 1 point: Class II (low risk - 0.9% complication rate)           Signed Electronically by: Ramona Ann Aaseby-Aguilera, PA-C  Copy of this evaluation report is provided to requesting physician.

## 2021-06-18 NOTE — PATIENT INSTRUCTIONS

## 2021-06-19 LAB
ANION GAP SERPL CALCULATED.3IONS-SCNC: 7 MMOL/L (ref 3–14)
BUN SERPL-MCNC: 28 MG/DL (ref 7–30)
CALCIUM SERPL-MCNC: 8.8 MG/DL (ref 8.5–10.1)
CHLORIDE SERPL-SCNC: 111 MMOL/L (ref 94–109)
CO2 SERPL-SCNC: 22 MMOL/L (ref 20–32)
CREAT SERPL-MCNC: 1.39 MG/DL (ref 0.52–1.04)
GFR SERPL CREATININE-BSD FRML MDRD: 37 ML/MIN/{1.73_M2}
GLUCOSE SERPL-MCNC: 102 MG/DL (ref 70–99)
POTASSIUM SERPL-SCNC: 4.1 MMOL/L (ref 3.4–5.3)
SODIUM SERPL-SCNC: 140 MMOL/L (ref 133–144)

## 2021-06-25 DIAGNOSIS — Z11.59 ENCOUNTER FOR SCREENING FOR OTHER VIRAL DISEASES: ICD-10-CM

## 2021-06-25 LAB
LABORATORY COMMENT REPORT: NORMAL
SARS-COV-2 RNA RESP QL NAA+PROBE: NEGATIVE
SARS-COV-2 RNA RESP QL NAA+PROBE: NORMAL
SPECIMEN SOURCE: NORMAL
SPECIMEN SOURCE: NORMAL

## 2021-06-25 PROCEDURE — U0005 INFEC AGEN DETEC AMPLI PROBE: HCPCS | Performed by: INTERNAL MEDICINE

## 2021-06-25 PROCEDURE — U0003 INFECTIOUS AGENT DETECTION BY NUCLEIC ACID (DNA OR RNA); SEVERE ACUTE RESPIRATORY SYNDROME CORONAVIRUS 2 (SARS-COV-2) (CORONAVIRUS DISEASE [COVID-19]), AMPLIFIED PROBE TECHNIQUE, MAKING USE OF HIGH THROUGHPUT TECHNOLOGIES AS DESCRIBED BY CMS-2020-01-R: HCPCS | Performed by: INTERNAL MEDICINE

## 2021-06-28 ENCOUNTER — HOSPITAL ENCOUNTER (OUTPATIENT)
Facility: CLINIC | Age: 76
Discharge: HOME | End: 2021-06-28
Attending: INTERNAL MEDICINE | Admitting: INTERNAL MEDICINE
Payer: MEDICARE

## 2021-06-28 ENCOUNTER — ANESTHESIA EVENT (OUTPATIENT)
Dept: GASTROENTEROLOGY | Facility: CLINIC | Age: 76
End: 2021-06-28
Payer: MEDICARE

## 2021-06-28 ENCOUNTER — ANESTHESIA (OUTPATIENT)
Dept: GASTROENTEROLOGY | Facility: CLINIC | Age: 76
End: 2021-06-28
Payer: MEDICARE

## 2021-06-28 VITALS
OXYGEN SATURATION: 98 % | DIASTOLIC BLOOD PRESSURE: 76 MMHG | RESPIRATION RATE: 28 BRPM | SYSTOLIC BLOOD PRESSURE: 120 MMHG | HEART RATE: 54 BPM

## 2021-06-28 LAB — COLONOSCOPY: NORMAL

## 2021-06-28 PROCEDURE — 999N000010 HC STATISTIC ANES STAT CODE-CRNA PER MINUTE: Performed by: INTERNAL MEDICINE

## 2021-06-28 PROCEDURE — 370N000017 HC ANESTHESIA TECHNICAL FEE, PER MIN: Performed by: INTERNAL MEDICINE

## 2021-06-28 PROCEDURE — 272N000105 HC DEVICE CLIP QUICK: Performed by: INTERNAL MEDICINE

## 2021-06-28 PROCEDURE — 258N000003 HC RX IP 258 OP 636: Performed by: NURSE ANESTHETIST, CERTIFIED REGISTERED

## 2021-06-28 PROCEDURE — 250N000011 HC RX IP 250 OP 636: Performed by: NURSE ANESTHETIST, CERTIFIED REGISTERED

## 2021-06-28 PROCEDURE — 250N000009 HC RX 250: Performed by: NURSE ANESTHETIST, CERTIFIED REGISTERED

## 2021-06-28 PROCEDURE — 88305 TISSUE EXAM BY PATHOLOGIST: CPT | Mod: TC | Performed by: INTERNAL MEDICINE

## 2021-06-28 PROCEDURE — 45385 COLONOSCOPY W/LESION REMOVAL: CPT | Performed by: INTERNAL MEDICINE

## 2021-06-28 PROCEDURE — 88305 TISSUE EXAM BY PATHOLOGIST: CPT | Mod: 26 | Performed by: PATHOLOGY

## 2021-06-28 RX ORDER — NALOXONE HYDROCHLORIDE 0.4 MG/ML
0.2 INJECTION, SOLUTION INTRAMUSCULAR; INTRAVENOUS; SUBCUTANEOUS
Status: DISCONTINUED | OUTPATIENT
Start: 2021-06-28 | End: 2021-06-28 | Stop reason: HOSPADM

## 2021-06-28 RX ORDER — PROPOFOL 10 MG/ML
INJECTION, EMULSION INTRAVENOUS PRN
Status: DISCONTINUED | OUTPATIENT
Start: 2021-06-28 | End: 2021-06-28

## 2021-06-28 RX ORDER — NALOXONE HYDROCHLORIDE 0.4 MG/ML
0.4 INJECTION, SOLUTION INTRAMUSCULAR; INTRAVENOUS; SUBCUTANEOUS
Status: DISCONTINUED | OUTPATIENT
Start: 2021-06-28 | End: 2021-06-28 | Stop reason: HOSPADM

## 2021-06-28 RX ORDER — ONDANSETRON 2 MG/ML
4 INJECTION INTRAMUSCULAR; INTRAVENOUS EVERY 6 HOURS PRN
Status: DISCONTINUED | OUTPATIENT
Start: 2021-06-28 | End: 2021-06-28 | Stop reason: HOSPADM

## 2021-06-28 RX ORDER — PROPOFOL 10 MG/ML
INJECTION, EMULSION INTRAVENOUS CONTINUOUS PRN
Status: DISCONTINUED | OUTPATIENT
Start: 2021-06-28 | End: 2021-06-28

## 2021-06-28 RX ORDER — PROCHLORPERAZINE MALEATE 5 MG
5 TABLET ORAL EVERY 6 HOURS PRN
Status: DISCONTINUED | OUTPATIENT
Start: 2021-06-28 | End: 2021-06-28 | Stop reason: HOSPADM

## 2021-06-28 RX ORDER — ONDANSETRON 2 MG/ML
INJECTION INTRAMUSCULAR; INTRAVENOUS PRN
Status: DISCONTINUED | OUTPATIENT
Start: 2021-06-28 | End: 2021-06-28

## 2021-06-28 RX ORDER — LIDOCAINE 40 MG/G
CREAM TOPICAL
Status: DISCONTINUED | OUTPATIENT
Start: 2021-06-28 | End: 2021-06-28 | Stop reason: HOSPADM

## 2021-06-28 RX ORDER — FLUMAZENIL 0.1 MG/ML
0.2 INJECTION, SOLUTION INTRAVENOUS
Status: DISCONTINUED | OUTPATIENT
Start: 2021-06-28 | End: 2021-06-28 | Stop reason: HOSPADM

## 2021-06-28 RX ORDER — SODIUM CHLORIDE, SODIUM LACTATE, POTASSIUM CHLORIDE, CALCIUM CHLORIDE 600; 310; 30; 20 MG/100ML; MG/100ML; MG/100ML; MG/100ML
INJECTION, SOLUTION INTRAVENOUS CONTINUOUS PRN
Status: DISCONTINUED | OUTPATIENT
Start: 2021-06-28 | End: 2021-06-28

## 2021-06-28 RX ORDER — ONDANSETRON 4 MG/1
4 TABLET, ORALLY DISINTEGRATING ORAL EVERY 6 HOURS PRN
Status: DISCONTINUED | OUTPATIENT
Start: 2021-06-28 | End: 2021-06-28 | Stop reason: HOSPADM

## 2021-06-28 RX ORDER — LIDOCAINE HYDROCHLORIDE 20 MG/ML
INJECTION, SOLUTION INFILTRATION; PERINEURAL PRN
Status: DISCONTINUED | OUTPATIENT
Start: 2021-06-28 | End: 2021-06-28

## 2021-06-28 RX ORDER — ONDANSETRON 2 MG/ML
4 INJECTION INTRAMUSCULAR; INTRAVENOUS
Status: DISCONTINUED | OUTPATIENT
Start: 2021-06-28 | End: 2021-06-28 | Stop reason: HOSPADM

## 2021-06-28 RX ORDER — EPHEDRINE SULFATE 50 MG/ML
INJECTION, SOLUTION INTRAMUSCULAR; INTRAVENOUS; SUBCUTANEOUS PRN
Status: DISCONTINUED | OUTPATIENT
Start: 2021-06-28 | End: 2021-06-28

## 2021-06-28 RX ADMIN — Medication 5 MG: at 10:26

## 2021-06-28 RX ADMIN — SODIUM CHLORIDE, POTASSIUM CHLORIDE, SODIUM LACTATE AND CALCIUM CHLORIDE: 600; 310; 30; 20 INJECTION, SOLUTION INTRAVENOUS at 10:19

## 2021-06-28 RX ADMIN — LIDOCAINE HYDROCHLORIDE 50 MG: 20 INJECTION, SOLUTION INFILTRATION; PERINEURAL at 10:21

## 2021-06-28 RX ADMIN — ONDANSETRON 4 MG: 2 INJECTION INTRAMUSCULAR; INTRAVENOUS at 10:21

## 2021-06-28 RX ADMIN — PROPOFOL 100 MCG/KG/MIN: 10 INJECTION, EMULSION INTRAVENOUS at 10:21

## 2021-06-28 RX ADMIN — PROPOFOL 30 MG: 10 INJECTION, EMULSION INTRAVENOUS at 10:21

## 2021-06-28 RX ADMIN — PROPOFOL 20 MG: 10 INJECTION, EMULSION INTRAVENOUS at 10:22

## 2021-06-28 RX ADMIN — PROPOFOL 20 MG: 10 INJECTION, EMULSION INTRAVENOUS at 10:26

## 2021-06-28 ASSESSMENT — COPD QUESTIONNAIRES: COPD: 0

## 2021-06-28 ASSESSMENT — ENCOUNTER SYMPTOMS: DYSRHYTHMIAS: 0

## 2021-06-28 ASSESSMENT — LIFESTYLE VARIABLES: TOBACCO_USE: 0

## 2021-06-28 NOTE — ANESTHESIA CARE TRANSFER NOTE
Patient: Tonja Nieves    Procedure(s):  COLONOSCOPY, FLEXIBLE, WITH LESION REMOVAL USING SNARE    Diagnosis: GI bleed [K92.2]  Melena [K92.1]  Diagnosis Additional Information: No value filed.    Anesthesia Type:   MAC     Note:    Oropharynx: oropharynx clear of all foreign objects  Level of Consciousness: awake  Oxygen Supplementation: face mask  Level of Supplemental Oxygen (L/min / FiO2): 6  Independent Airway: airway patency satisfactory and stable  Dentition: dentition unchanged  Vital Signs Stable: post-procedure vital signs reviewed and stable  Report to RN Given: handoff report given  Patient transferred to: PACU  Comments: At end of procedure, spontaneous respirations, patient alert to voice, able to follow commands. Oxygen via facemask at 6 liters per minute to PACU. Oxygen tubing connected to wall O2 in PACU, SpO2, NiBP, and EKG monitors and alarms on and functioning, report on patient's clinical status given to PACU RN, RN questions answered.  Handoff Report: Identifed the Patient, Identified the Reponsible Provider, Reviewed the pertinent medical history, Discussed the surgical course, Reviewed Intra-OP anesthesia mangement and issues during anesthesia, Set expectations for post-procedure period and Allowed opportunity for questions and acknowledgement of understanding      Vitals: (Last set prior to Anesthesia Care Transfer)  CRNA VITALS  6/28/2021 1029 - 6/28/2021 1102      6/28/2021             Pulse:  52    SpO2:  99 %    Resp Rate (set):  10        Electronically Signed By: LEONELA Alonzo CRNA  June 28, 2021  11:02 AM

## 2021-06-28 NOTE — PROGRESS NOTES
[facility] - Pre-Endoscopy History and Physical     Tonja Nieves MRN# [id number]   YOB: 1945 Age: 76 year old     Date of Procedure: 6/28/2021  Primary care provider: Aaseby-Aguilera, Ramona Ann  Type of Endoscopy: colonoscopy  Reason for Procedure: +FOBT, anemia  Type of Anesthesia Anticipated: colonoscopy    HPI:    Tonja is a 76 year old female who will be undergoing the above procedure.      A history and physical has been performed, 6/18/21, reviewed.     The patient's medications and allergies have been reviewed. The risks and benefits of the procedure and the sedation options and risks were discussed with the patient.  All questions were answered and informed consent was obtained.      She denies a personal or family history of anesthesia complications or bleeding disorders.     Patient Active Problem List   Diagnosis     Advanced directives, counseling/discussion     Urge incontinence of urine     CKD (chronic kidney disease) stage 3, GFR 30-59 ml/min     CARDIOVASCULAR SCREENING; LDL GOAL LESS THAN 100     Other specified hypothyroidism     Cerebrovascular accident (CVA), unspecified mechanism (H)     Gastrointestinal hemorrhage, unspecified gastrointestinal hemorrhage type        Past Medical History:   Diagnosis Date     Chronic kidney disease, stage 3, mod decreased GFR      Hypothyroid      PONV (postoperative nausea and vomiting)      TIA (transient ischemic attack) 04/08/2021     Urge incontinence         Past Surgical History:   Procedure Laterality Date     bilateral hip replacement  2006     COLONOSCOPY       ESOPHAGOSCOPY, GASTROSCOPY, DUODENOSCOPY (EGD), COMBINED N/A 4/16/2021    Procedure: ESOPHAGOGASTRODUODENOSCOPY (EGD);  Surgeon: Beck Reyes MD;  Location:  GI     GASTRIC BYPASS  2005    rounx-en-y     TONSILLECTOMY      age 9       Social History     Tobacco Use     Smoking status: Never Smoker     Smokeless tobacco: Never Used   Substance Use Topics     Alcohol use: No  "    Alcohol/week: 0.0 standard drinks       Family History   Problem Relation Age of Onset     Alzheimer Disease Mother      Hypertension Mother      Diabetes Father      Heart Disease Father      Genetic Disorder Brother 55        ms     Thyroid Disease Sister 60        thyroid      Thyroid Disease Daughter      Diabetes Paternal Grandmother        Prior to Admission medications    Medication Sig Start Date End Date Taking? Authorizing Provider   atorvastatin (LIPITOR) 40 MG tablet 1 tablet (40 mg) by Oral or Feeding Tube route every evening 5/19/21  Yes Aaseby-Aguilera, Ramona Ann, PA-C   levothyroxine (SYNTHROID/LEVOTHROID) 50 MCG tablet Take 1 tablet (50 mcg) by mouth daily Take one by mouth daily 10/9/20  Yes Aaseby-Aguilera, Ramona Ann, PA-C   oxybutynin (OXYTROL) 3.9 MG/24HR BIW patch Place 1 patch onto the skin twice a week 5/1/17  Yes Aaseby-Aguilera, Ramona Ann, PA-C   clopidogrel (PLAVIX) 75 MG tablet 1 tablet (75 mg) by Oral or Feeding Tube route daily 6/9/21   Aaseby-Aguilera, Ramona Ann, PA-C       No Known Allergies     REVIEW OF SYSTEMS:   A comprehensive ROS was negative    PHYSICAL EXAM:   BP (!) 140/88   Pulse 53   Resp 16   SpO2 98%  Estimated body mass index is 31.01 kg/m  as calculated from the following:    Height as of 6/18/21: 1.702 m (5' 7\").    Weight as of 6/18/21: 89.8 kg (198 lb).   GENERAL APPEARANCE: alert, and oriented  MENTAL STATUS: alert  RESP: lungs clear to auscultation - no rales, rhonchi or wheezes  CV: regular rates and rhythm      IMPRESSION   +FOBT, anemia (EGD from 4/2021 admit reviewed)    PLAN:   Colonoscopy      The above has been forwarded to the consulting provider.      Signed Electronically by: Miguel Ángel Britton,   June 28, 2021              "

## 2021-06-28 NOTE — ANESTHESIA POSTPROCEDURE EVALUATION
Patient: Tonja Nieves    Procedure(s):  COLONOSCOPY, FLEXIBLE, WITH LESION REMOVAL USING SNARE    Diagnosis:GI bleed [K92.2]  Melena [K92.1]  Diagnosis Additional Information: No value filed.    Anesthesia Type:  MAC    Note:     Postop Pain Control: Uneventful            Sign Out: Well controlled pain   PONV: No   Neuro/Psych: Uneventful            Sign Out: Acceptable/Baseline neuro status   Airway/Respiratory: Uneventful            Sign Out: Acceptable/Baseline resp. status   CV/Hemodynamics: Uneventful            Sign Out: Acceptable CV status; No obvious hypovolemia; No obvious fluid overload   Other NRE: NONE   DID A NON-ROUTINE EVENT OCCUR? No           Last vitals:  Vitals:    06/28/21 1105 06/28/21 1110 06/28/21 1115   BP:  120/76    Pulse:  54    Resp: 26 20 28   SpO2: 99% 98% 98%       Last vitals prior to Anesthesia Care Transfer:  CRNA VITALS  6/28/2021 1029 - 6/28/2021 1129      6/28/2021             Resp Rate (observed):  16    EKG:  Sinus bradycardia          Electronically Signed By: Zeus Ballard MD  June 28, 2021  5:42 PM

## 2021-06-28 NOTE — ANESTHESIA PREPROCEDURE EVALUATION
Anesthesia Pre-Procedure Evaluation    Patient: Tonja Nieves   MRN: [id number] : 1945        Preoperative Diagnosis: GI bleed [K92.2]  Melena [K92.1]   Procedure : Procedure(s):  COLONOSCOPY     Past Medical History:   Diagnosis Date     Chronic kidney disease, stage 3, mod decreased GFR      Hypothyroid      PONV (postoperative nausea and vomiting)      TIA (transient ischemic attack) 2021     Urge incontinence       Past Surgical History:   Procedure Laterality Date     bilateral hip replacement       COLONOSCOPY       ESOPHAGOSCOPY, GASTROSCOPY, DUODENOSCOPY (EGD), COMBINED N/A 2021    Procedure: ESOPHAGOGASTRODUODENOSCOPY (EGD);  Surgeon: Beck Reyes MD;  Location:  GI     GASTRIC BYPASS      rounx-en-y     TONSILLECTOMY      age 9      No Known Allergies   Social History     Tobacco Use     Smoking status: Never Smoker     Smokeless tobacco: Never Used   Substance Use Topics     Alcohol use: No     Alcohol/week: 0.0 standard drinks      Wt Readings from Last 1 Encounters:   21 89.8 kg (198 lb)        Anesthesia Evaluation   Pt has had prior anesthetic.     History of anesthetic complications  - PONV.      ROS/MED HX  ENT/Pulmonary:    (-) tobacco use, asthma, COPD and sleep apnea   Neurologic:     (+) TIA, date: , features: Bilateral leg and arm weakness.     Cardiovascular:     (+) -----Previous cardiac testing   Echo: Date:  Results:  The left ventricle is normal in size.  The visual ejection fraction is estimated at 60-65%.  Grade I or early diastolic dysfunction.  No regional wall motion abnormalities noted.  No significant valvular heart disease.  Stress Test: Date: Results:    ECG Reviewed: Date: Results:    Cath: Date: Results:   (-) hypertension and arrhythmias   METS/Exercise Tolerance:     Hematologic:       Musculoskeletal:       GI/Hepatic:    (-) GERD   Renal/Genitourinary:     (+) renal disease, type: CRI,     Endo: Comment: BMI 31    (+)  thyroid problem, hypothyroidism, Obesity,  (-) Type I DM and Type II DM   Psychiatric/Substance Use:       Infectious Disease:       Malignancy:       Other:            Physical Exam    Airway        Mallampati: II   TM distance: > 3 FB   Neck ROM: full   Mouth opening: > 3 cm    Respiratory Devices and Support         Dental  no notable dental history         Cardiovascular          Rhythm and rate: regular     Pulmonary           breath sounds clear to auscultation           OUTSIDE LABS:  CBC:   Lab Results   Component Value Date    WBC 5.4 06/18/2021    WBC 5.6 04/21/2021    HGB 11.1 (L) 06/18/2021    HGB 11.1 (L) 04/21/2021    HCT 35.1 06/18/2021    HCT 34.7 (L) 04/21/2021     06/18/2021     04/21/2021     BMP:   Lab Results   Component Value Date     06/18/2021     05/18/2021    POTASSIUM 4.1 06/18/2021    POTASSIUM 4.7 05/18/2021    CHLORIDE 111 (H) 06/18/2021    CHLORIDE 110 (H) 05/18/2021    CO2 22 06/18/2021    CO2 25 05/18/2021    BUN 28 06/18/2021    BUN 24 05/18/2021    CR 1.39 (H) 06/18/2021    CR 1.36 (H) 05/18/2021     (H) 06/18/2021    GLC 94 05/18/2021     COAGS:   Lab Results   Component Value Date    PTT 30 04/15/2021    INR 1.03 04/15/2021     POC:   Lab Results   Component Value Date    BGM 93 04/08/2021     HEPATIC:   Lab Results   Component Value Date    ALBUMIN 3.6 05/18/2021    PROTTOTAL 7.4 05/18/2021     (H) 05/18/2021    AST 45 05/18/2021    ALKPHOS 90 05/18/2021    BILITOTAL 0.6 05/18/2021     OTHER:   Lab Results   Component Value Date    A1C 5.6 04/09/2021    INO 8.8 06/18/2021    TSH 2.04 10/22/2020       Anesthesia Plan    ASA Status:  2      Anesthesia Type: MAC.              Consents    Anesthesia Plan(s) and associated risks, benefits, and realistic alternatives discussed. Questions answered and patient/representative(s) expressed understanding.     - Discussed with:  Patient         Postoperative Care       PONV prophylaxis: Ondansetron  (or other 5HT-3)     Comments:         H&P reviewed: Unable to attach H&P to encounter due to EHR limitations. H&P Update: appropriate H&P reviewed, patient examined. No interval changes since H&P (within 30 days).         Zeus Ballard MD

## 2021-06-29 LAB — COPATH REPORT: NORMAL

## 2021-07-29 ENCOUNTER — OFFICE VISIT (OUTPATIENT)
Dept: FAMILY MEDICINE | Facility: CLINIC | Age: 76
End: 2021-07-29
Payer: MEDICARE

## 2021-07-29 VITALS
WEIGHT: 194.6 LBS | TEMPERATURE: 98.4 F | HEIGHT: 67 IN | DIASTOLIC BLOOD PRESSURE: 60 MMHG | SYSTOLIC BLOOD PRESSURE: 101 MMHG | OXYGEN SATURATION: 99 % | BODY MASS INDEX: 30.54 KG/M2 | HEART RATE: 50 BPM

## 2021-07-29 DIAGNOSIS — I63.9 CEREBROVASCULAR ACCIDENT (CVA), UNSPECIFIED MECHANISM (H): ICD-10-CM

## 2021-07-29 DIAGNOSIS — E03.8 OTHER SPECIFIED HYPOTHYROIDISM: ICD-10-CM

## 2021-07-29 DIAGNOSIS — N18.31 STAGE 3A CHRONIC KIDNEY DISEASE (H): ICD-10-CM

## 2021-07-29 DIAGNOSIS — F51.01 PRIMARY INSOMNIA: Primary | ICD-10-CM

## 2021-07-29 LAB
ERYTHROCYTE [DISTWIDTH] IN BLOOD BY AUTOMATED COUNT: 16.2 % (ref 10–15)
HCT VFR BLD AUTO: 37.1 % (ref 35–47)
HGB BLD-MCNC: 11.6 G/DL (ref 11.7–15.7)
MCH RBC QN AUTO: 25.1 PG (ref 26.5–33)
MCHC RBC AUTO-ENTMCNC: 31.3 G/DL (ref 31.5–36.5)
MCV RBC AUTO: 80 FL (ref 78–100)
PLATELET # BLD AUTO: 240 10E3/UL (ref 150–450)
RBC # BLD AUTO: 4.62 10E6/UL (ref 3.8–5.2)
WBC # BLD AUTO: 4.5 10E3/UL (ref 4–11)

## 2021-07-29 PROCEDURE — 80048 BASIC METABOLIC PNL TOTAL CA: CPT | Performed by: PHYSICIAN ASSISTANT

## 2021-07-29 PROCEDURE — 36415 COLL VENOUS BLD VENIPUNCTURE: CPT | Performed by: PHYSICIAN ASSISTANT

## 2021-07-29 PROCEDURE — 84443 ASSAY THYROID STIM HORMONE: CPT | Performed by: PHYSICIAN ASSISTANT

## 2021-07-29 PROCEDURE — 85027 COMPLETE CBC AUTOMATED: CPT | Performed by: PHYSICIAN ASSISTANT

## 2021-07-29 PROCEDURE — 99214 OFFICE O/P EST MOD 30 MIN: CPT | Performed by: PHYSICIAN ASSISTANT

## 2021-07-29 RX ORDER — CLOPIDOGREL BISULFATE 75 MG/1
75 TABLET ORAL DAILY
Qty: 90 TABLET | Refills: 3 | Status: SHIPPED | OUTPATIENT
Start: 2021-07-29 | End: 2022-03-09

## 2021-07-29 RX ORDER — QUETIAPINE FUMARATE 50 MG/1
50-100 TABLET, FILM COATED ORAL AT BEDTIME
Qty: 60 TABLET | Refills: 1 | Status: SHIPPED | OUTPATIENT
Start: 2021-07-29 | End: 2021-12-09

## 2021-07-29 RX ORDER — LEVOTHYROXINE SODIUM 50 UG/1
50 TABLET ORAL DAILY
Qty: 90 TABLET | Refills: 3 | Status: SHIPPED | OUTPATIENT
Start: 2021-07-29 | End: 2022-01-26

## 2021-07-29 ASSESSMENT — MIFFLIN-ST. JEOR: SCORE: 1415.33

## 2021-07-29 NOTE — PROGRESS NOTES
Clinic Care Coordination Contact  Community Health Worker Initial Outreach            Patient accepts CC: No, I feel like I've gotten all my questions answered. They hospital did a great job of explaining everything. I have my follow up with my primary doctor tomorrow. No barriers or concerns were identified today during call. Patient thanked me for the call.    Recd OV Notes/Letter from Dr. Shahzad Blanca Ophthalmologist dated 7/28/21. Faxed to [facility] office for Dr. Ernesto Reddy to review.

## 2021-07-29 NOTE — PROGRESS NOTES
"    Assessment & Plan     Other specified hypothyroidism  Stable symptoms   - levothyroxine (SYNTHROID/LEVOTHROID) 50 MCG tablet; Take 1 tablet (50 mcg) by mouth daily Take one by mouth daily  - TSH with free T4 reflex    Stage 3a chronic kidney disease    - levothyroxine (SYNTHROID/LEVOTHROID) 50 MCG tablet; Take 1 tablet (50 mcg) by mouth daily Take one by mouth daily  - Basic metabolic panel  (Ca, Cl, CO2, Creat, Gluc, K, Na, BUN)    Cerebrovascular accident (CVA), unspecified mechanism (H)    - clopidogrel (PLAVIX) 75 MG tablet; 1 tablet (75 mg) by Oral or Feeding Tube route daily  - CBC with platelets    Primary insomnia  advised taking 1/2 tablet   - QUEtiapine (SEROQUEL) 50 MG tablet; Take 1-2 tablets ( mg) by mouth At Bedtime    0956}         No follow-ups on file.    Ramona Ann Aaseby-Aguilera, PA-C  [facility] Evangel[facility] is a 76 year old who presents for the following health issues     History of Present Illness       She eats 2-3 servings of fruits and vegetables daily.She consumes 2 sweetened beverage(s) daily.She exercises with enough effort to increase her heart rate 9 or less minutes per day.  She exercises with enough effort to increase her heart rate 3 or less days per week.   She is taking medications regularly.       Medication Followup of synthroid    Taking Medication as prescribed: yes    Side Effects:  None    Medication Helping Symptoms:  yes     Also, she has additional complaints of doesn't sleep well.  Tried trazodone and gave her a headache.  otc melatonin doesn't work .      Review of Systems   Constitutional, HEENT, cardiovascular, pulmonary, gi and gu systems are negative, except as otherwise noted.      Objective    /60 (BP Location: Right arm, Patient Position: Chair, Cuff Size: Adult Large)   Pulse 50   Temp 98.4  F (36.9  C) (Oral)   Ht 1.702 m (5' 7\")   Wt 88.3 kg (194 lb 9.6 oz)   SpO2 99%   BMI 30.48 kg/m    Body mass index " is 30.48 kg/m .  Physical Exam   GENERAL: healthy, alert and no distress  HENT: ear canals and TM's normal, nose and mouth without ulcers or lesions  RESP: lungs clear to auscultation - no rales, rhonchi or wheezes  CV: regular rate and rhythm, normal S1 S2, no S3 or S4, no murmur, click or rub, no peripheral edema and peripheral pulses strong  MS: no gross musculoskeletal defects noted, no edema  SKIN: no suspicious lesions or rashes  NEURO: Normal strength and tone, mentation intact and speech normal  PSYCH: mentation appears normal, affect normal/bright

## 2021-07-30 LAB
ANION GAP SERPL CALCULATED.3IONS-SCNC: 6 MMOL/L (ref 3–14)
BUN SERPL-MCNC: 29 MG/DL (ref 7–30)
CALCIUM SERPL-MCNC: 8.9 MG/DL (ref 8.5–10.1)
CHLORIDE BLD-SCNC: 111 MMOL/L (ref 94–109)
CO2 SERPL-SCNC: 21 MMOL/L (ref 20–32)
CREAT SERPL-MCNC: 1.32 MG/DL (ref 0.52–1.04)
GFR SERPL CREATININE-BSD FRML MDRD: 40 ML/MIN/1.73M2
GLUCOSE BLD-MCNC: 93 MG/DL (ref 70–99)
POTASSIUM BLD-SCNC: 5.2 MMOL/L (ref 3.4–5.3)
SODIUM SERPL-SCNC: 138 MMOL/L (ref 133–144)
TSH SERPL DL<=0.005 MIU/L-ACNC: 2.22 MU/L (ref 0.4–4)

## 2021-09-05 DIAGNOSIS — I63.9 CEREBROVASCULAR ACCIDENT (CVA), UNSPECIFIED MECHANISM (H): ICD-10-CM

## 2021-09-07 RX ORDER — CLOPIDOGREL BISULFATE 75 MG/1
TABLET ORAL
Qty: 90 TABLET | Refills: 3 | OUTPATIENT
Start: 2021-09-07

## 2021-10-02 ENCOUNTER — HEALTH MAINTENANCE LETTER (OUTPATIENT)
Age: 76
End: 2021-10-02

## 2021-11-27 ENCOUNTER — HEALTH MAINTENANCE LETTER (OUTPATIENT)
Age: 76
End: 2021-11-27

## 2021-11-28 DIAGNOSIS — I63.9 CEREBROVASCULAR ACCIDENT (CVA), UNSPECIFIED MECHANISM (H): ICD-10-CM

## 2021-11-30 RX ORDER — ATORVASTATIN CALCIUM 40 MG/1
TABLET, FILM COATED ORAL
Qty: 90 TABLET | Refills: 1 | Status: SHIPPED | OUTPATIENT
Start: 2021-11-30 | End: 2022-07-20

## 2021-11-30 NOTE — TELEPHONE ENCOUNTER
Prescription approved per [facility] Refill Protocol.  LDL Cholesterol Calculated   Date Value Ref Range Status   04/09/2021 110 (H) <100 mg/dL Final     Comment:     Above desirable:  100-129 mg/dl  Borderline High:  130-159 mg/dL  High:             160-189 mg/dL  Very high:       >189 mg/dl      Liz Cline RN

## 2022-01-04 ENCOUNTER — MYC MEDICAL ADVICE (OUTPATIENT)
Dept: FAMILY MEDICINE | Facility: CLINIC | Age: 77
End: 2022-01-04
Payer: COMMERCIAL

## 2022-01-12 NOTE — PROGRESS NOTES
Pre-Visit Planning   1/14/22 at 3:00 PM with Ramona Aaseby-Aguilera  Appointment Notes for this encounter:   Annual visit, check chronic kidney disease    Questionnaires Reviewed/Assigned  No additional questionnaires are needed  Patient preferred phone number: [phone]    E-check in Complete.   Leigha POPE RN

## 2022-01-13 SDOH — ECONOMIC STABILITY: TRANSPORTATION INSECURITY
IN THE PAST 12 MONTHS, HAS LACK OF TRANSPORTATION KEPT YOU FROM MEETINGS, WORK, OR FROM GETTING THINGS NEEDED FOR DAILY LIVING?: NO

## 2022-01-13 SDOH — ECONOMIC STABILITY: TRANSPORTATION INSECURITY
IN THE PAST 12 MONTHS, HAS THE LACK OF TRANSPORTATION KEPT YOU FROM MEDICAL APPOINTMENTS OR FROM GETTING MEDICATIONS?: NO

## 2022-01-13 SDOH — HEALTH STABILITY: PHYSICAL HEALTH: ON AVERAGE, HOW MANY DAYS PER WEEK DO YOU ENGAGE IN MODERATE TO STRENUOUS EXERCISE (LIKE A BRISK WALK)?: 0 DAYS

## 2022-01-13 SDOH — ECONOMIC STABILITY: INCOME INSECURITY: HOW HARD IS IT FOR YOU TO PAY FOR THE VERY BASICS LIKE FOOD, HOUSING, MEDICAL CARE, AND HEATING?: NOT HARD AT ALL

## 2022-01-13 SDOH — ECONOMIC STABILITY: FOOD INSECURITY: WITHIN THE PAST 12 MONTHS, THE FOOD YOU BOUGHT JUST DIDN'T LAST AND YOU DIDN'T HAVE MONEY TO GET MORE.: NEVER TRUE

## 2022-01-13 SDOH — HEALTH STABILITY: PHYSICAL HEALTH: ON AVERAGE, HOW MANY MINUTES DO YOU ENGAGE IN EXERCISE AT THIS LEVEL?: 0 MIN

## 2022-01-13 SDOH — ECONOMIC STABILITY: FOOD INSECURITY: WITHIN THE PAST 12 MONTHS, YOU WORRIED THAT YOUR FOOD WOULD RUN OUT BEFORE YOU GOT MONEY TO BUY MORE.: NEVER TRUE

## 2022-01-13 SDOH — ECONOMIC STABILITY: INCOME INSECURITY: IN THE LAST 12 MONTHS, WAS THERE A TIME WHEN YOU WERE NOT ABLE TO PAY THE MORTGAGE OR RENT ON TIME?: NO

## 2022-01-13 ASSESSMENT — ACTIVITIES OF DAILY LIVING (ADL): CURRENT_FUNCTION: NO ASSISTANCE NEEDED

## 2022-01-13 ASSESSMENT — LIFESTYLE VARIABLES
HOW OFTEN DO YOU HAVE SIX OR MORE DRINKS ON ONE OCCASION: NEVER
HOW MANY STANDARD DRINKS CONTAINING ALCOHOL DO YOU HAVE ON A TYPICAL DAY: PATIENT DECLINED
HOW OFTEN DO YOU HAVE A DRINK CONTAINING ALCOHOL: NEVER

## 2022-01-13 ASSESSMENT — SOCIAL DETERMINANTS OF HEALTH (SDOH)
IN A TYPICAL WEEK, HOW MANY TIMES DO YOU TALK ON THE PHONE WITH FAMILY, FRIENDS, OR NEIGHBORS?: MORE THAN THREE TIMES A WEEK
HOW OFTEN DO YOU GET TOGETHER WITH FRIENDS OR RELATIVES?: MORE THAN THREE TIMES A WEEK
HOW OFTEN DO YOU ATTEND CHURCH OR RELIGIOUS SERVICES?: NEVER
DO YOU BELONG TO ANY CLUBS OR ORGANIZATIONS SUCH AS CHURCH GROUPS UNIONS, FRATERNAL OR ATHLETIC GROUPS, OR SCHOOL GROUPS?: NO

## 2022-01-13 ASSESSMENT — ENCOUNTER SYMPTOMS
JOINT SWELLING: 0
NERVOUS/ANXIOUS: 0
DYSURIA: 0
EYE PAIN: 0
HEMATURIA: 0
DIARRHEA: 0
COUGH: 0
HEADACHES: 0
PARESTHESIAS: 0
WEAKNESS: 0
NAUSEA: 0
CONSTIPATION: 0
SHORTNESS OF BREATH: 0
FEVER: 0
DIZZINESS: 0
HEARTBURN: 0
HEMATOCHEZIA: 0
MYALGIAS: 0
SORE THROAT: 0
ARTHRALGIAS: 0
FREQUENCY: 1
CHILLS: 0
ABDOMINAL PAIN: 0
PALPITATIONS: 0
BREAST MASS: 0

## 2022-01-14 ENCOUNTER — OFFICE VISIT (OUTPATIENT)
Dept: FAMILY MEDICINE | Facility: CLINIC | Age: 77
End: 2022-01-14
Payer: MEDICARE

## 2022-01-14 VITALS
HEIGHT: 65 IN | RESPIRATION RATE: 18 BRPM | HEART RATE: 54 BPM | OXYGEN SATURATION: 100 % | WEIGHT: 199.5 LBS | SYSTOLIC BLOOD PRESSURE: 108 MMHG | BODY MASS INDEX: 33.24 KG/M2 | TEMPERATURE: 98.7 F | DIASTOLIC BLOOD PRESSURE: 68 MMHG

## 2022-01-14 DIAGNOSIS — Z12.31 VISIT FOR SCREENING MAMMOGRAM: Primary | ICD-10-CM

## 2022-01-14 DIAGNOSIS — E03.8 OTHER SPECIFIED HYPOTHYROIDISM: ICD-10-CM

## 2022-01-14 DIAGNOSIS — Z00.00 ENCOUNTER FOR MEDICARE ANNUAL WELLNESS EXAM: ICD-10-CM

## 2022-01-14 DIAGNOSIS — Z13.0 SCREENING FOR BLOOD DISEASE: ICD-10-CM

## 2022-01-14 DIAGNOSIS — Z13.1 SCREENING FOR DIABETES MELLITUS: ICD-10-CM

## 2022-01-14 DIAGNOSIS — N18.31 STAGE 3A CHRONIC KIDNEY DISEASE (H): ICD-10-CM

## 2022-01-14 DIAGNOSIS — F51.01 PRIMARY INSOMNIA: ICD-10-CM

## 2022-01-14 DIAGNOSIS — Z13.220 SCREENING, LIPID: ICD-10-CM

## 2022-01-14 PROCEDURE — G0439 PPPS, SUBSEQ VISIT: HCPCS | Performed by: PHYSICIAN ASSISTANT

## 2022-01-14 RX ORDER — LISINOPRIL 2.5 MG/1
2.5 TABLET ORAL DAILY
Qty: 90 TABLET | Refills: 3 | Status: SHIPPED | OUTPATIENT
Start: 2022-01-14 | End: 2023-01-25

## 2022-01-14 RX ORDER — QUETIAPINE FUMARATE 50 MG/1
50-100 TABLET, FILM COATED ORAL AT BEDTIME
Qty: 180 TABLET | Refills: 3 | Status: SHIPPED | OUTPATIENT
Start: 2022-01-14 | End: 2023-02-09

## 2022-01-14 ASSESSMENT — ENCOUNTER SYMPTOMS
HEMATOCHEZIA: 0
ARTHRALGIAS: 0
ABDOMINAL PAIN: 0
CONSTIPATION: 0
HEMATURIA: 0
NERVOUS/ANXIOUS: 0
HEARTBURN: 0
SORE THROAT: 0
DIZZINESS: 0
MYALGIAS: 0
HEADACHES: 0
FEVER: 0
DIARRHEA: 0
WEAKNESS: 0
COUGH: 0
JOINT SWELLING: 0
PARESTHESIAS: 0
CHILLS: 0
DYSURIA: 0
FREQUENCY: 1
BREAST MASS: 0
PALPITATIONS: 0
NAUSEA: 0
SHORTNESS OF BREATH: 0
EYE PAIN: 0

## 2022-01-14 ASSESSMENT — ACTIVITIES OF DAILY LIVING (ADL): CURRENT_FUNCTION: NO ASSISTANCE NEEDED

## 2022-01-14 ASSESSMENT — MIFFLIN-ST. JEOR: SCORE: 1409.77

## 2022-01-14 NOTE — PATIENT INSTRUCTIONS
Patient Education   Personalized Prevention Plan  You are due for the preventive services outlined below.  Your care team is available to assist you in scheduling these services.  If you have already completed any of these items, please share that information with your care team to update in your medical record.  Health Maintenance Due   Topic Date Due     ANNUAL REVIEW OF HM ORDERS  Never done     Mammogram  09/03/2021     Annual Wellness Visit  10/09/2021     FALL RISK ASSESSMENT  10/09/2021     Kidney Microalbumin Urine Test  10/22/2021

## 2022-01-14 NOTE — PROGRESS NOTES
"SUBJECTIVE:   Tonja Nieves is a 76 year old female who presents for Preventive Visit.      Patient has been advised of split billing requirements and indicates understanding: Yes   Are you in the first 12 months of your Medicare coverage?  No    Healthy Habits:     In general, how would you rate your overall health?  Good    Frequency of exercise:  None    Do you usually eat at least 4 servings of fruit and vegetables a day, include whole grains    & fiber and avoid regularly eating high fat or \"junk\" foods?  Yes    Taking medications regularly:  Yes    Medication side effects:  None    Ability to successfully perform activities of daily living:  No assistance needed    Home Safety:  No safety concerns identified    Hearing Impairment:  No hearing concerns    In the past 6 months, have you been bothered by leaking of urine? Yes    In general, how would you rate your overall mental or emotional health?  Excellent      PHQ-2 Total Score: 0    Additional concerns today:  No    Do you feel safe in your environment? Yes    Have you ever done Advance Care Planning? (For example, a Health Directive, POLST, or a discussion with a medical provider or your loved ones about your wishes): No, advance care planning information given to patient to review.  Patient plans to discuss their wishes with loved ones or provider.         Fall risk  Fallen 2 or more times in the past year?: No  Any fall with injury in the past year?: No    Cognitive Screening   1) Repeat 3 items (Leader, Season, Table)    2) Clock draw: NORMAL  3) 3 item recall: Recalls 2 objects   Results: NORMAL clock, 1-2 items recalled: COGNITIVE IMPAIRMENT LESS LIKELY    Mini-CogTM Copyright DINA Salter. Licensed by the author for use in [facility]; reprinted with permission (kera@.[facility]). All rights reserved.      Do you have sleep apnea, excessive snoring or daytime drowsiness?: yes    Reviewed and updated as needed this visit by clinical " staff  Tobacco  Allergies  Meds   Med Hx  Surg Hx  Fam Hx  Soc Hx       Reviewed and updated as needed this visit by Provider               Social History     Tobacco Use     Smoking status: Never Smoker     Smokeless tobacco: Never Used   Substance Use Topics     Alcohol use: No     Alcohol/week: 0.0 standard drinks         Alcohol Use 1/13/2022   Prescreen: >3 drinks/day or >7 drinks/week? Not Applicable   Prescreen: >3 drinks/day or >7 drinks/week? -               Current providers sharing in care for this patient include:   Patient Care Team:  Aaseby-Aguilera, Ramona Ann, PA-C as PCP - General (Family Practice)  Aaseby-Aguilera, Ramona Ann, PA-C as Assigned PCP  Tae Mosquera MD as Assigned Neuroscience Provider  Leigha Webb RN as Personal Advocate & Liaison (PAL) (Family Medicine)    The following health maintenance items are reviewed in Epic and correct as of today:  Health Maintenance Due   Topic Date Due     ANNUAL REVIEW OF HM ORDERS  Never done     MAMMO SCREENING  09/03/2021     MEDICARE ANNUAL WELLNESS VISIT  10/09/2021     FALL RISK ASSESSMENT  10/09/2021     MICROALBUMIN  10/22/2021     BP Readings from Last 3 Encounters:   01/14/22 108/68   07/29/21 101/60   06/28/21 120/76    Wt Readings from Last 3 Encounters:   01/14/22 90.5 kg (199 lb 8 oz)   07/29/21 88.3 kg (194 lb 9.6 oz)   06/18/21 89.8 kg (198 lb)                  Current Outpatient Medications   Medication Sig Dispense Refill     lisinopril (ZESTRIL) 2.5 MG tablet Take 1 tablet (2.5 mg) by mouth daily 90 tablet 3     QUEtiapine (SEROQUEL) 50 MG tablet Take 1-2 tablets ( mg) by mouth At Bedtime 180 tablet 3     atorvastatin (LIPITOR) 40 MG tablet TAKE 1 TABLET BY MOUTH OR FEEDING TUBE ROUTE EVERY EVENING 90 tablet 1     clopidogrel (PLAVIX) 75 MG tablet 1 tablet (75 mg) by Oral or Feeding Tube route daily 90 tablet 3     levothyroxine (SYNTHROID/LEVOTHROID) 50 MCG tablet Take 1 tablet (50 mcg) by mouth daily  "Take one by mouth daily 90 tablet 3     oxybutynin (OXYTROL) 3.9 MG/24HR BIW patch Place 1 patch onto the skin twice a week 8 patch 11     Recent Labs   Lab Test 07/29/21  1127 06/18/21  1606 05/18/21  1250 04/15/21  1130 04/09/21  0758 04/08/21  1415 10/22/20  0944 05/02/19  1031   A1C  --   --   --   --  5.6  --   --   --    LDL  --   --   --   --  110*  --  119* 126*   HDL  --   --   --   --  86  --  89 93   TRIG  --   --   --   --  81  --  101 99   ALT  --   --  102*  --   --  34 27 29   CR 1.32* 1.39* 1.36*   < > 1.20* 1.49* 1.34* 1.28*   GFRESTIMATED 40* 37* 38*   < > 44* 34* 39* 42*   GFRESTBLACK  --  43* 44*   < > 52* 40* 45* 48*   POTASSIUM 5.2 4.1 4.7   < > 4.8 4.4 4.4 4.8   TSH 2.22  --   --   --   --   --  2.04 2.44    < > = values in this interval not displayed.              Review of Systems   Constitutional: Negative for chills and fever.   HENT: Negative for congestion, ear pain, hearing loss and sore throat.    Eyes: Negative for pain and visual disturbance.   Respiratory: Negative for cough and shortness of breath.    Cardiovascular: Negative for chest pain, palpitations and peripheral edema.   Gastrointestinal: Negative for abdominal pain, constipation, diarrhea, heartburn, hematochezia and nausea.   Breasts:  Negative for tenderness, breast mass and discharge.   Genitourinary: Positive for frequency and urgency. Negative for dysuria, genital sores, hematuria, pelvic pain, vaginal bleeding and vaginal discharge.   Musculoskeletal: Negative for arthralgias, joint swelling and myalgias.   Skin: Negative for rash.   Neurological: Negative for dizziness, weakness, headaches and paresthesias.   Psychiatric/Behavioral: Negative for mood changes. The patient is not nervous/anxious.          OBJECTIVE:   /68   Pulse 54   Temp 98.7  F (37.1  C) (Oral)   Resp 18   Ht 1.657 m (5' 5.25\")   Wt 90.5 kg (199 lb 8 oz)   SpO2 100%   BMI 32.94 kg/m   Estimated body mass index is 32.94 kg/m  as " "calculated from the following:    Height as of this encounter: 1.657 m (5' 5.25\").    Weight as of this encounter: 90.5 kg (199 lb 8 oz).  Physical Exam  GENERAL APPEARANCE: healthy, alert and no distress  EYES: Eyes grossly normal to inspection, PERRL and conjunctivae and sclerae normal  HENT: ear canals and TM's normal, nose and mouth without ulcers or lesions, oropharynx clear and oral mucous membranes moist  NECK: no adenopathy, no asymmetry, masses, or scars and thyroid normal to palpation  RESP: lungs clear to auscultation - no rales, rhonchi or wheezes  BREAST: normal without masses, tenderness or nipple discharge and no palpable axillary masses or adenopathy  CV: regular rate and rhythm, normal S1 S2, no S3 or S4, no murmur, click or rub, no peripheral edema and peripheral pulses strong  ABDOMEN: soft, nontender, no hepatosplenomegaly, no masses and bowel sounds normal  MS: no musculoskeletal defects are noted and gait is age appropriate without ataxia  SKIN: no suspicious lesions or rashes  NEURO: Normal strength and tone, sensory exam grossly normal, mentation intact and speech normal  PSYCH: mentation appears normal and affect normal/bright    Diagnostic Test Results:  Labs reviewed in Epic    ASSESSMENT / PLAN:   (Z12.31) Visit for screening mammogram  (primary encounter diagnosis)  Comment:   Plan: MA SCREENING DIGITAL BILAT - Future  (s+30)            (Z00.00) Encounter for Medicare annual wellness exam  Comment:   Plan:     (N18.31) Stage 3a chronic kidney disease (H)  Comment: stable   Plan: Albumin Random Urine Quantitative with Creat         Ratio, lisinopril (ZESTRIL) 2.5 MG tablet,         Basic metabolic panel, CBC with platelets            (E03.8) Other specified hypothyroidism  Comment:   Plan:     (Z13.220) Screening, lipid  Comment:   Plan: Lipid Profile (Chol, Trig, HDL, LDL calc)            (Z13.1) Screening for diabetes mellitus  Comment:   Plan: Basic metabolic panel            (Z13.0) " "Screening for blood disease  Comment:   Plan: CBC with platelets            (F51.01) Primary insomnia  Comment: works well   Plan: QUEtiapine (SEROQUEL) 50 MG tablet              Patient has been advised of split billing requirements and indicates understanding: Yes  COUNSELING:  Reviewed preventive health counseling, as reflected in patient instructions       Regular exercise       Healthy diet/nutrition       Vision screening       Hearing screening       Bladder control       Fall risk prevention    Estimated body mass index is 32.94 kg/m  as calculated from the following:    Height as of this encounter: 1.657 m (5' 5.25\").    Weight as of this encounter: 90.5 kg (199 lb 8 oz).    Weight management plan: Discussed healthy diet and exercise guidelines    She reports that she has never smoked. She has never used smokeless tobacco.      Appropriate preventive services were discussed with this patient, including applicable screening as appropriate for cardiovascular disease, diabetes, osteopenia/osteoporosis, and glaucoma.  As appropriate for age/gender, discussed screening for colorectal cancer, prostate cancer, breast cancer, and cervical cancer. Checklist reviewing preventive services available has been given to the patient.    Reviewed patients plan of care and provided an AVS. The Basic Care Plan (routine screening as documented in Health Maintenance) for Tonja meets the Care Plan requirement. This Care Plan has been established and reviewed with the Patient.    Counseling Resources:  ATP IV Guidelines  Pooled Cohorts Equation Calculator  Breast Cancer Risk Calculator  Breast Cancer: Medication to Reduce Risk  FRAX Risk Assessment  ICSI Preventive Guidelines  Dietary Guidelines for Americans, 2010  USDA's MyPlate  ASA Prophylaxis  Lung CA Screening    Ramona Ann Aaseby-Aguilera, PA-C [facility]    Identified Health Risks:  "

## 2022-01-26 ENCOUNTER — MYC MEDICAL ADVICE (OUTPATIENT)
Dept: FAMILY MEDICINE | Facility: CLINIC | Age: 77
End: 2022-01-26
Payer: COMMERCIAL

## 2022-01-26 DIAGNOSIS — E03.8 OTHER SPECIFIED HYPOTHYROIDISM: ICD-10-CM

## 2022-01-26 RX ORDER — LEVOTHYROXINE SODIUM 50 UG/1
50 TABLET ORAL DAILY
Qty: 90 TABLET | Refills: 3 | Status: SHIPPED | OUTPATIENT
Start: 2022-01-26 | End: 2023-01-25

## 2022-01-26 NOTE — TELEPHONE ENCOUNTER
New pharmacy requested.Prescription approved per [facility] Refill Protocol.  Leigha POPE RN

## 2022-01-28 ENCOUNTER — MYC MEDICAL ADVICE (OUTPATIENT)
Dept: FAMILY MEDICINE | Facility: CLINIC | Age: 77
End: 2022-01-28
Payer: COMMERCIAL

## 2022-02-01 ENCOUNTER — LAB (OUTPATIENT)
Dept: LAB | Facility: CLINIC | Age: 77
End: 2022-02-01
Payer: MEDICARE

## 2022-02-01 DIAGNOSIS — Z13.0 SCREENING FOR BLOOD DISEASE: ICD-10-CM

## 2022-02-01 DIAGNOSIS — Z13.1 SCREENING FOR DIABETES MELLITUS: ICD-10-CM

## 2022-02-01 DIAGNOSIS — Z13.220 SCREENING, LIPID: ICD-10-CM

## 2022-02-01 DIAGNOSIS — N18.31 STAGE 3A CHRONIC KIDNEY DISEASE (H): ICD-10-CM

## 2022-02-01 LAB
ANION GAP SERPL CALCULATED.3IONS-SCNC: 5 MMOL/L (ref 3–14)
BUN SERPL-MCNC: 34 MG/DL (ref 7–30)
CALCIUM SERPL-MCNC: 8.9 MG/DL (ref 8.5–10.1)
CHLORIDE BLD-SCNC: 114 MMOL/L (ref 94–109)
CHOLEST SERPL-MCNC: 175 MG/DL
CO2 SERPL-SCNC: 23 MMOL/L (ref 20–32)
CREAT SERPL-MCNC: 1.33 MG/DL (ref 0.52–1.04)
CREAT UR-MCNC: 103 MG/DL
ERYTHROCYTE [DISTWIDTH] IN BLOOD BY AUTOMATED COUNT: 17.8 % (ref 10–15)
FASTING STATUS PATIENT QL REPORTED: YES
GFR SERPL CREATININE-BSD FRML MDRD: 42 ML/MIN/1.73M2
GLUCOSE BLD-MCNC: 99 MG/DL (ref 70–99)
HCT VFR BLD AUTO: 39.2 % (ref 35–47)
HDLC SERPL-MCNC: 101 MG/DL
HGB BLD-MCNC: 12.2 G/DL (ref 11.7–15.7)
LDLC SERPL CALC-MCNC: 60 MG/DL
MCH RBC QN AUTO: 25.5 PG (ref 26.5–33)
MCHC RBC AUTO-ENTMCNC: 31.1 G/DL (ref 31.5–36.5)
MCV RBC AUTO: 82 FL (ref 78–100)
MICROALBUMIN UR-MCNC: 7 MG/L
MICROALBUMIN/CREAT UR: 6.8 MG/G CR (ref 0–25)
NONHDLC SERPL-MCNC: 74 MG/DL
PLATELET # BLD AUTO: 238 10E3/UL (ref 150–450)
POTASSIUM BLD-SCNC: 4.7 MMOL/L (ref 3.4–5.3)
RBC # BLD AUTO: 4.79 10E6/UL (ref 3.8–5.2)
SODIUM SERPL-SCNC: 142 MMOL/L (ref 133–144)
TRIGL SERPL-MCNC: 70 MG/DL
WBC # BLD AUTO: 4.4 10E3/UL (ref 4–11)

## 2022-02-01 PROCEDURE — 36415 COLL VENOUS BLD VENIPUNCTURE: CPT

## 2022-02-01 PROCEDURE — 85027 COMPLETE CBC AUTOMATED: CPT

## 2022-02-01 PROCEDURE — 80061 LIPID PANEL: CPT

## 2022-02-01 PROCEDURE — 82043 UR ALBUMIN QUANTITATIVE: CPT

## 2022-02-01 PROCEDURE — 80048 BASIC METABOLIC PNL TOTAL CA: CPT

## 2022-02-07 ENCOUNTER — ANCILLARY PROCEDURE (OUTPATIENT)
Dept: MAMMOGRAPHY | Facility: CLINIC | Age: 77
End: 2022-02-07
Attending: PHYSICIAN ASSISTANT
Payer: MEDICARE

## 2022-02-07 DIAGNOSIS — Z12.31 VISIT FOR SCREENING MAMMOGRAM: ICD-10-CM

## 2022-02-07 PROCEDURE — 77063 BREAST TOMOSYNTHESIS BI: CPT | Mod: TC | Performed by: RADIOLOGY

## 2022-02-07 PROCEDURE — 77067 SCR MAMMO BI INCL CAD: CPT | Mod: TC | Performed by: RADIOLOGY

## 2022-03-01 ENCOUNTER — MYC MEDICAL ADVICE (OUTPATIENT)
Dept: FAMILY MEDICINE | Facility: CLINIC | Age: 77
End: 2022-03-01
Payer: COMMERCIAL

## 2022-03-09 ENCOUNTER — MYC MEDICAL ADVICE (OUTPATIENT)
Dept: FAMILY MEDICINE | Facility: CLINIC | Age: 77
End: 2022-03-09
Payer: COMMERCIAL

## 2022-03-09 DIAGNOSIS — I63.9 CEREBROVASCULAR ACCIDENT (CVA), UNSPECIFIED MECHANISM (H): ICD-10-CM

## 2022-03-09 RX ORDER — CLOPIDOGREL BISULFATE 75 MG/1
75 TABLET ORAL DAILY
Qty: 90 TABLET | Refills: 0 | Status: SHIPPED | OUTPATIENT
Start: 2022-03-09 | End: 2022-07-29

## 2022-03-09 RX ORDER — CLOPIDOGREL BISULFATE 75 MG/1
75 TABLET ORAL DAILY
Qty: 90 TABLET | Refills: 3 | Status: SHIPPED | OUTPATIENT
Start: 2022-03-09 | End: 2022-03-09

## 2022-05-30 ENCOUNTER — MYC MEDICAL ADVICE (OUTPATIENT)
Dept: FAMILY MEDICINE | Facility: CLINIC | Age: 77
End: 2022-05-30
Payer: COMMERCIAL

## 2022-05-31 ENCOUNTER — OFFICE VISIT (OUTPATIENT)
Dept: FAMILY MEDICINE | Facility: CLINIC | Age: 77
End: 2022-05-31
Payer: MEDICARE

## 2022-05-31 VITALS
HEART RATE: 60 BPM | WEIGHT: 195 LBS | BODY MASS INDEX: 32.2 KG/M2 | RESPIRATION RATE: 16 BRPM | SYSTOLIC BLOOD PRESSURE: 105 MMHG | DIASTOLIC BLOOD PRESSURE: 64 MMHG | TEMPERATURE: 98.1 F

## 2022-05-31 DIAGNOSIS — N30.00 ACUTE CYSTITIS WITHOUT HEMATURIA: Primary | ICD-10-CM

## 2022-05-31 DIAGNOSIS — R30.0 DYSURIA: ICD-10-CM

## 2022-05-31 LAB
ALBUMIN UR-MCNC: ABNORMAL MG/DL
APPEARANCE UR: CLEAR
BACTERIA #/AREA URNS HPF: ABNORMAL /HPF
BILIRUB UR QL STRIP: NEGATIVE
COLOR UR AUTO: YELLOW
GLUCOSE UR STRIP-MCNC: NEGATIVE MG/DL
HGB UR QL STRIP: ABNORMAL
KETONES UR STRIP-MCNC: NEGATIVE MG/DL
LEUKOCYTE ESTERASE UR QL STRIP: ABNORMAL
NITRATE UR QL: NEGATIVE
PH UR STRIP: 6 [PH] (ref 5–7)
RBC #/AREA URNS AUTO: ABNORMAL /HPF
SP GR UR STRIP: 1.01 (ref 1–1.03)
SQUAMOUS #/AREA URNS AUTO: ABNORMAL /LPF
UROBILINOGEN UR STRIP-ACNC: 0.2 E.U./DL
WBC #/AREA URNS AUTO: ABNORMAL /HPF

## 2022-05-31 PROCEDURE — 87086 URINE CULTURE/COLONY COUNT: CPT | Performed by: FAMILY MEDICINE

## 2022-05-31 PROCEDURE — 99213 OFFICE O/P EST LOW 20 MIN: CPT | Performed by: FAMILY MEDICINE

## 2022-05-31 PROCEDURE — 81001 URINALYSIS AUTO W/SCOPE: CPT | Performed by: FAMILY MEDICINE

## 2022-05-31 RX ORDER — SULFAMETHOXAZOLE/TRIMETHOPRIM 800-160 MG
1 TABLET ORAL 2 TIMES DAILY
Qty: 6 TABLET | Refills: 0 | Status: SHIPPED | OUTPATIENT
Start: 2022-05-31 | End: 2022-06-03

## 2022-05-31 NOTE — PROGRESS NOTES
Assessment & Plan     Acute cystitis without hematuria  Discussed diagnosis of urinary tract infection based on symptoms with UA results and will treat as below.  Urine culture will be done and will call if results show a change of plan is indicated.  Discussed UTI prevention.  Follow-up if fever or abdominal pain occurs.  - sulfamethoxazole-trimethoprim (BACTRIM DS) 800-160 MG tablet; Take 1 tablet by mouth 2 times daily for 3 days    Dysuria  - UA Macro with Reflex to Micro and Culture - lab collect; Future  - UA Macro with Reflex to Micro and Culture - lab collect  - Urine Microscopic Exam  - Urine Culture    No follow-ups on file.    Gregg Acevedo MD  [facility] is a 77 year old who presents for the following health issues     History of Present Illness       Reason for visit:  UTI  Symptom onset:  3-7 days ago  Symptoms include:  Burning when urinating, some blood  Symptom intensity:  Moderate  Symptom progression:  Staying the same  Had these symptoms before:  Yes  Has tried/received treatment for these symptoms:  Yes  Previous treatment was successful:  Yes  Prior treatment description:  Put on antibiotics    She eats 0-1 servings of fruits and vegetables daily.She consumes 2 sweetened beverage(s) daily.She exercises with enough effort to increase her heart rate 9 or less minutes per day.  She exercises with enough effort to increase her heart rate 3 or less days per week.   She is taking medications regularly.     Patient with dysuria and urinary frequency over the past 5 to 6 days.  Denies fever or abdominal and flank pain.    Review of Systems         Objective    /64 (BP Location: Right arm, Patient Position: Chair, Cuff Size: Adult Large)   Pulse 60   Temp 98.1  F (36.7  C) (Oral)   Resp 16   Wt 88.5 kg (195 lb)   Breastfeeding No   BMI 32.20 kg/m    Body mass index is 32.2 kg/m .  Physical Exam   GENERAL: healthy, alert and no  distress  ABDOMEN: soft, nontender, without hepatosplenomegaly or masses no flank pain

## 2022-05-31 NOTE — TELEPHONE ENCOUNTER
appt scheduled for in clinic and pt notified of appt.-Sharmin Cooley, ANDRÉS  [facility]

## 2022-06-01 LAB — BACTERIA UR CULT: NORMAL

## 2022-07-20 NOTE — PROGRESS NOTES
Pre-Visit Planning   Next 5 appointments (look out 90 days)    Jul 29, 2022  2:00 PM  (Arrive by 1:40 PM)  Annual Wellness Visit with Ramona Ann Aaseby-Aguilera, PA-C  [facility] ([facility] [address] [address] [address] [facility] [address] [phone]        Appointment Notes for this encounter:   Annual wellness and med check    Questionnaires Reviewed/Assigned  No additional questionnaires are needed     Contacted patient via OurShelf. Are there any additional questions or concerns you'd like to review with your provider during your visit? No    Visit is preventive. Reviewed purpose of preventive visit with patient.    Meds  Home Meds reviewed and updated    Entered patient-preferred pharmacy.     Current Outpatient Medications   Medication     atorvastatin (LIPITOR) 40 MG tablet     clopidogrel (PLAVIX) 75 MG tablet     levothyroxine (SYNTHROID/LEVOTHROID) 50 MCG tablet     lisinopril (ZESTRIL) 2.5 MG tablet     oxybutynin (OXYTROL) 3.9 MG/24HR BIW patch     QUEtiapine (SEROQUEL) 50 MG tablet     No current facility-administered medications for this visit.     Our[employer]helf  Patient is active on OurShelf.    Call Summary  Advised patient to call back at [phone] if needed.     Jacqui Huynh RN

## 2022-07-29 ENCOUNTER — OFFICE VISIT (OUTPATIENT)
Dept: FAMILY MEDICINE | Facility: CLINIC | Age: 77
End: 2022-07-29
Payer: MEDICARE

## 2022-07-29 VITALS
BODY MASS INDEX: 32.2 KG/M2 | HEART RATE: 54 BPM | WEIGHT: 195 LBS | DIASTOLIC BLOOD PRESSURE: 60 MMHG | OXYGEN SATURATION: 97 % | RESPIRATION RATE: 14 BRPM | SYSTOLIC BLOOD PRESSURE: 108 MMHG | TEMPERATURE: 97.5 F

## 2022-07-29 DIAGNOSIS — R30.0 DYSURIA: Primary | ICD-10-CM

## 2022-07-29 DIAGNOSIS — I63.9 CEREBROVASCULAR ACCIDENT (CVA), UNSPECIFIED MECHANISM (H): ICD-10-CM

## 2022-07-29 LAB
ALBUMIN UR-MCNC: NEGATIVE MG/DL
APPEARANCE UR: CLEAR
BILIRUB UR QL STRIP: NEGATIVE
COLOR UR AUTO: YELLOW
GLUCOSE UR STRIP-MCNC: NEGATIVE MG/DL
HGB UR QL STRIP: NEGATIVE
KETONES UR STRIP-MCNC: NEGATIVE MG/DL
LEUKOCYTE ESTERASE UR QL STRIP: NEGATIVE
NITRATE UR QL: NEGATIVE
PH UR STRIP: 5.5 [PH] (ref 5–7)
SP GR UR STRIP: 1.02 (ref 1–1.03)
UROBILINOGEN UR STRIP-ACNC: 0.2 E.U./DL

## 2022-07-29 PROCEDURE — 99213 OFFICE O/P EST LOW 20 MIN: CPT | Performed by: PHYSICIAN ASSISTANT

## 2022-07-29 PROCEDURE — 81003 URINALYSIS AUTO W/O SCOPE: CPT | Performed by: PHYSICIAN ASSISTANT

## 2022-07-29 RX ORDER — CLOPIDOGREL BISULFATE 75 MG/1
75 TABLET ORAL DAILY
Qty: 90 TABLET | Refills: 0 | Status: SHIPPED | OUTPATIENT
Start: 2022-07-29 | End: 2023-02-09

## 2022-07-29 RX ORDER — ATORVASTATIN CALCIUM 40 MG/1
TABLET, FILM COATED ORAL
Qty: 90 TABLET | Refills: 1 | Status: SHIPPED | OUTPATIENT
Start: 2022-07-29 | End: 2023-02-09

## 2022-07-29 ASSESSMENT — ANXIETY QUESTIONNAIRES
7. FEELING AFRAID AS IF SOMETHING AWFUL MIGHT HAPPEN: NOT AT ALL
1. FEELING NERVOUS, ANXIOUS, OR ON EDGE: NOT AT ALL
GAD7 TOTAL SCORE: 0
GAD7 TOTAL SCORE: 0
4. TROUBLE RELAXING: NOT AT ALL
IF YOU CHECKED OFF ANY PROBLEMS ON THIS QUESTIONNAIRE, HOW DIFFICULT HAVE THESE PROBLEMS MADE IT FOR YOU TO DO YOUR WORK, TAKE CARE OF THINGS AT HOME, OR GET ALONG WITH OTHER PEOPLE: NOT DIFFICULT AT ALL
3. WORRYING TOO MUCH ABOUT DIFFERENT THINGS: NOT AT ALL
8. IF YOU CHECKED OFF ANY PROBLEMS, HOW DIFFICULT HAVE THESE MADE IT FOR YOU TO DO YOUR WORK, TAKE CARE OF THINGS AT HOME, OR GET ALONG WITH OTHER PEOPLE?: NOT DIFFICULT AT ALL
6. BECOMING EASILY ANNOYED OR IRRITABLE: NOT AT ALL
2. NOT BEING ABLE TO STOP OR CONTROL WORRYING: NOT AT ALL
7. FEELING AFRAID AS IF SOMETHING AWFUL MIGHT HAPPEN: NOT AT ALL
GAD7 TOTAL SCORE: 0
5. BEING SO RESTLESS THAT IT IS HARD TO SIT STILL: NOT AT ALL

## 2022-07-29 ASSESSMENT — ENCOUNTER SYMPTOMS
ABDOMINAL PAIN: 0
DIARRHEA: 0
NERVOUS/ANXIOUS: 0
FEVER: 0
CHILLS: 0
DIZZINESS: 0
EYE PAIN: 0
ARTHRALGIAS: 1
HEMATURIA: 0
NAUSEA: 0
SORE THROAT: 0
SHORTNESS OF BREATH: 0
HEARTBURN: 0
PARESTHESIAS: 0
JOINT SWELLING: 0
BREAST MASS: 0
MYALGIAS: 0
WEAKNESS: 0
FREQUENCY: 1
DYSURIA: 0
PALPITATIONS: 0
HEADACHES: 0
CONSTIPATION: 0
HEMATOCHEZIA: 0
COUGH: 0

## 2022-07-29 ASSESSMENT — ACTIVITIES OF DAILY LIVING (ADL): CURRENT_FUNCTION: NO ASSISTANCE NEEDED

## 2022-07-29 ASSESSMENT — PATIENT HEALTH QUESTIONNAIRE - PHQ9
SUM OF ALL RESPONSES TO PHQ QUESTIONS 1-9: 1
10. IF YOU CHECKED OFF ANY PROBLEMS, HOW DIFFICULT HAVE THESE PROBLEMS MADE IT FOR YOU TO DO YOUR WORK, TAKE CARE OF THINGS AT HOME, OR GET ALONG WITH OTHER PEOPLE: NOT DIFFICULT AT ALL
SUM OF ALL RESPONSES TO PHQ QUESTIONS 1-9: 1

## 2022-07-29 NOTE — PROGRESS NOTES
"  Assessment & Plan     Cerebrovascular accident (CVA), unspecified mechanism (H)  Stable   - atorvastatin (LIPITOR) 40 MG tablet; TAKE 1 TABLET BY MOUTH OR FEEDING TUBE ROUTE EVERY EVENING  - clopidogrel (PLAVIX) 75 MG tablet; 1 tablet (75 mg) by Oral or Feeding Tube route daily    Dysuria  within normal limits    - UA Macro with Reflex to Micro and Culture - lab collect  2883}     BMI:   Estimated body mass index is 32.2 kg/m  as calculated from the following:    Height as of 1/14/22: 1.657 m (5' 5.25\").    Weight as of this encounter: 88.5 kg (195 lb).   Weight management plan: Discussed healthy diet and exercise guidelines        No follow-ups on file.    Ramona Ann Aaseby-Aguilera, PA-C  [facility] is a 77 year old, presenting for the following health issues:  Recheck Medication      Healthy Habits:     In general, how would you rate your overall health?  Good    Frequency of exercise:  None    Do you usually eat at least 4 servings of fruit and vegetables a day, include whole grains    & fiber and avoid regularly eating high fat or \"junk\" foods?  Yes    Taking medications regularly:  Yes    Medication side effects:  None    Ability to successfully perform activities of daily living:  No assistance needed    Home Safety:  No safety concerns identified    Hearing Impairment:  No hearing concerns    In the past 6 months, have you been bothered by leaking of urine? Yes    In general, how would you rate your overall mental or emotional health?  Good      PHQ-2 Total Score: 0    Additional concerns today:  No     Med check.    Had UTI sx last week. Better with cranberry pills and juice. No sx now.            Review of Systems   Constitutional, HEENT, cardiovascular, pulmonary, gi and gu systems are negative, except as otherwise noted.      Objective    /60   Pulse 54   Temp 97.5  F (36.4  C) (Oral)   Resp 14   Wt 88.5 kg (195 lb)   SpO2 97%   BMI 32.20 kg/m  "   Body mass index is 32.2 kg/m .   [facility]      Physical Exam   GENERAL: healthy, alert and no distress  HENT: ear canals and TM's normal, nose and mouth without ulcers or lesions  RESP: lungs clear to auscultation - no rales, rhonchi or wheezes  CV: regular rate and rhythm, normal S1 S2, no S3 or S4, no murmur, click or rub, no peripheral edema and peripheral pulses strong  MS: no gross musculoskeletal defects noted, no edema                    .  ..

## 2022-09-03 ENCOUNTER — HEALTH MAINTENANCE LETTER (OUTPATIENT)
Age: 77
End: 2022-09-03

## 2023-01-06 ENCOUNTER — OFFICE VISIT (OUTPATIENT)
Dept: FAMILY MEDICINE | Facility: CLINIC | Age: 78
End: 2023-01-06
Payer: MEDICARE

## 2023-01-06 VITALS
HEART RATE: 54 BPM | SYSTOLIC BLOOD PRESSURE: 118 MMHG | WEIGHT: 201.3 LBS | RESPIRATION RATE: 18 BRPM | TEMPERATURE: 97 F | DIASTOLIC BLOOD PRESSURE: 68 MMHG | OXYGEN SATURATION: 98 % | BODY MASS INDEX: 33.24 KG/M2

## 2023-01-06 DIAGNOSIS — N30.00 ACUTE CYSTITIS WITHOUT HEMATURIA: ICD-10-CM

## 2023-01-06 DIAGNOSIS — R30.0 DYSURIA: ICD-10-CM

## 2023-01-06 LAB
ALBUMIN UR-MCNC: NEGATIVE MG/DL
APPEARANCE UR: CLEAR
BACTERIA #/AREA URNS HPF: ABNORMAL /HPF
BILIRUB UR QL STRIP: NEGATIVE
COLOR UR AUTO: YELLOW
GLUCOSE UR STRIP-MCNC: NEGATIVE MG/DL
HGB UR QL STRIP: NEGATIVE
KETONES UR STRIP-MCNC: NEGATIVE MG/DL
LEUKOCYTE ESTERASE UR QL STRIP: ABNORMAL
MUCOUS THREADS #/AREA URNS LPF: PRESENT /LPF
NITRATE UR QL: POSITIVE
PH UR STRIP: 5.5 [PH] (ref 5–7)
RBC #/AREA URNS AUTO: ABNORMAL /HPF
SP GR UR STRIP: 1.02 (ref 1–1.03)
SQUAMOUS #/AREA URNS AUTO: ABNORMAL /LPF
UROBILINOGEN UR STRIP-ACNC: 0.2 E.U./DL
WBC #/AREA URNS AUTO: ABNORMAL /HPF

## 2023-01-06 PROCEDURE — 87086 URINE CULTURE/COLONY COUNT: CPT | Performed by: FAMILY MEDICINE

## 2023-01-06 PROCEDURE — 81001 URINALYSIS AUTO W/SCOPE: CPT | Performed by: FAMILY MEDICINE

## 2023-01-06 PROCEDURE — 99213 OFFICE O/P EST LOW 20 MIN: CPT | Performed by: FAMILY MEDICINE

## 2023-01-06 RX ORDER — SULFAMETHOXAZOLE/TRIMETHOPRIM 800-160 MG
1 TABLET ORAL 2 TIMES DAILY
Qty: 6 TABLET | Refills: 0 | Status: SHIPPED | OUTPATIENT
Start: 2023-01-06 | End: 2023-01-09

## 2023-01-06 NOTE — PATIENT INSTRUCTIONS
I will review your studies via SkyGiraffet when they are available. If you have any questions or concerns please let me know via Myandb, or call the clinic.

## 2023-01-06 NOTE — PROGRESS NOTES
Assessment & Plan   See after visit summary and result note from studies for helpful information and advice given to patient.    Acute cystitis without hematuria    - sulfamethoxazole-trimethoprim (BACTRIM DS) 800-160 MG tablet  Dispense: 6 tablet; Refill: 0    Dysuria    - UA Macro with Reflex to Micro and Culture - lab collect  - UA Macro with Reflex to Micro and Culture - lab collect  - Urine Microscopic Exam  - Urine Culture                 No follow-ups on file.    Evin DO [place]  [facility] GABI Evangelista is a 77 year old, presenting for the following health issues:  UTI      UTI    History of Present Illness       Reason for visit:  Burning when urinato  Symptom onset:  1-3 days ago  Symptoms include:  Burning whrn urinating  Symptom intensity:  Severe  Symptom progression:  Worsening  Had these symptoms before:  Yes  Has tried/received treatment for these symptoms:  Yes  Previous treatment was successful:  Yes  Prior treatment description:  Antibiotics given  What makes it worse:  No  What makes it better:  Antibiotics    She eats 0-1 servings of fruits and vegetables daily.She consumes 2 sweetened beverage(s) daily.She exercises with enough effort to increase her heart rate 9 or less minutes per day.  She exercises with enough effort to increase her heart rate 3 or less days per week.   She is taking medications regularly.             Review of Systems   Check in questions and answers reviewed. Patient is seen for chief concern of possible urinary tract infection.    Has had symptoms for about 3 days.     No recent fever, chills, or new back pain.         Objective    /68   Pulse 54   Temp 97  F (36.1  C) (Tympanic)   Resp 18   Wt 91.3 kg (201 lb 4.8 oz)   SpO2 98%   BMI 33.24 kg/m    Body mass index is 33.24 kg/m .  Physical Exam   Vital signs reviewed.  Patient is in no acute appearing distress.  Breathing appears nonlabored.  Patient is alert and oriented  ×3.  Patient is very pleasant, making good eye contact and responding with clear fluent speech.    Heart: Heart rate is regular without murmur.    Lungs: Lungs are clear to auscultation with good airflow bilaterally.    Back: no areas of tenderness.     Skin/extremities: Warm and dry, with no lower leg edema.    Nitrite noted in UA macro result, so I recommended treatment for UTI basted on this finding.

## 2023-01-08 LAB — BACTERIA UR CULT: NORMAL

## 2023-01-25 DIAGNOSIS — N18.31 STAGE 3A CHRONIC KIDNEY DISEASE (H): ICD-10-CM

## 2023-01-25 DIAGNOSIS — E03.8 OTHER SPECIFIED HYPOTHYROIDISM: ICD-10-CM

## 2023-01-25 RX ORDER — LISINOPRIL 2.5 MG/1
2.5 TABLET ORAL DAILY
Qty: 90 TABLET | Refills: 1 | Status: SHIPPED | OUTPATIENT
Start: 2023-01-25 | End: 2023-08-01

## 2023-01-25 RX ORDER — LEVOTHYROXINE SODIUM 50 UG/1
50 TABLET ORAL DAILY
Qty: 90 TABLET | Refills: 0 | Status: SHIPPED | OUTPATIENT
Start: 2023-01-25 | End: 2023-02-09

## 2023-01-25 RX ORDER — LEVOTHYROXINE SODIUM 50 UG/1
50 TABLET ORAL DAILY
Qty: 90 TABLET | Refills: 3 | Status: CANCELLED | OUTPATIENT
Start: 2023-01-25

## 2023-01-25 RX ORDER — LISINOPRIL 2.5 MG/1
2.5 TABLET ORAL DAILY
Qty: 90 TABLET | Refills: 3 | Status: CANCELLED | OUTPATIENT
Start: 2023-01-25

## 2023-01-25 NOTE — PROGRESS NOTES
Pre-Visit Planning   Next 5 appointments (look out 90 days)    Feb 09, 2023 (Arrive by 9:10 AM)  Provider Visit with Ramona Ann Aaseby-Aguilera, PA-C  [facility] ([facility] ) [address] [address] [facility] [address] [phone]        Appointment Notes for this encounter:  6 month check     Questionnaires Reviewed/Assigned No additional questionnaires are needed     Patient preferred phone number: [phone]    Contacted patient via  Fios. Are there any additional questions or concerns you'd like to review with your provider during your visit? No    Visit is not preventive.    Meds  Home Meds reviewed and updated    Entered patient-preferred pharmacy.     Current Outpatient Medications   Medication     atorvastatin (LIPITOR) 40 MG tablet     clopidogrel (PLAVIX) 75 MG tablet     levothyroxine (SYNTHROID/LEVOTHROID) 50 MCG tablet     lisinopril (ZESTRIL) 2.5 MG tablet     oxybutynin (OXYTROL) 3.9 MG/24HR BIW patch     QUEtiapine (SEROQUEL) 50 MG tablet     No current facility-administered medications for this visit.     Fios  Patient is active on Fios.    Call Summary  Advised patient to call back at [phone] if needed.       Jacqui Huynh RN

## 2023-01-25 NOTE — TELEPHONE ENCOUNTER
Routing refill request to provider for review/approval because:  TSH   Date Value Ref Range Status   07/29/2021 2.22 0.40 - 4.00 mU/L Final   10/22/2020 2.04 0.40 - 4.00 mU/L Final

## 2023-01-25 NOTE — TELEPHONE ENCOUNTER
Routing refill request to provider for review/approval because:  Creatinine   Date Value Ref Range Status   02/01/2022 1.33 (H) 0.52 - 1.04 mg/dL Final   06/18/2021 1.39 (H) 0.52 - 1.04 mg/dL Final       Pt has appt 2/9    Jacqui Huynh RN

## 2023-02-01 ENCOUNTER — TELEPHONE (OUTPATIENT)
Dept: FAMILY MEDICINE | Facility: CLINIC | Age: 78
End: 2023-02-01
Payer: MEDICARE

## 2023-02-01 NOTE — TELEPHONE ENCOUNTER
Patient Quality Outreach    Patient is due for the following:   Physical Annual Wellness Visit    Next Steps:   Patient has upcoming appointment, these items will be addressed at that time.    Type of outreach:    Chart review performed, no outreach needed.      Questions for provider review:    None     Akila Britton CMA

## 2023-02-07 SDOH — ECONOMIC STABILITY: INCOME INSECURITY: IN THE LAST 12 MONTHS, WAS THERE A TIME WHEN YOU WERE NOT ABLE TO PAY THE MORTGAGE OR RENT ON TIME?: NO

## 2023-02-07 SDOH — HEALTH STABILITY: PHYSICAL HEALTH: ON AVERAGE, HOW MANY DAYS PER WEEK DO YOU ENGAGE IN MODERATE TO STRENUOUS EXERCISE (LIKE A BRISK WALK)?: 0 DAYS

## 2023-02-07 SDOH — ECONOMIC STABILITY: FOOD INSECURITY: WITHIN THE PAST 12 MONTHS, THE FOOD YOU BOUGHT JUST DIDN'T LAST AND YOU DIDN'T HAVE MONEY TO GET MORE.: NEVER TRUE

## 2023-02-07 SDOH — ECONOMIC STABILITY: FOOD INSECURITY: WITHIN THE PAST 12 MONTHS, YOU WORRIED THAT YOUR FOOD WOULD RUN OUT BEFORE YOU GOT MONEY TO BUY MORE.: NEVER TRUE

## 2023-02-07 SDOH — ECONOMIC STABILITY: INCOME INSECURITY: HOW HARD IS IT FOR YOU TO PAY FOR THE VERY BASICS LIKE FOOD, HOUSING, MEDICAL CARE, AND HEATING?: NOT HARD AT ALL

## 2023-02-07 SDOH — HEALTH STABILITY: PHYSICAL HEALTH: ON AVERAGE, HOW MANY MINUTES DO YOU ENGAGE IN EXERCISE AT THIS LEVEL?: 0 MIN

## 2023-02-07 ASSESSMENT — ENCOUNTER SYMPTOMS
EYE PAIN: 0
DIZZINESS: 0
COUGH: 0
HEMATURIA: 0
SHORTNESS OF BREATH: 0
JOINT SWELLING: 0
PARESTHESIAS: 0
ABDOMINAL PAIN: 0
DIARRHEA: 0
HEARTBURN: 0
WEAKNESS: 0
NAUSEA: 0
DYSURIA: 0
SORE THROAT: 0
CHILLS: 0
PALPITATIONS: 0
MYALGIAS: 0
FEVER: 0
ARTHRALGIAS: 0
HEADACHES: 0
CONSTIPATION: 0
NERVOUS/ANXIOUS: 0
BREAST MASS: 0
HEMATOCHEZIA: 0
FREQUENCY: 1

## 2023-02-07 ASSESSMENT — ACTIVITIES OF DAILY LIVING (ADL): CURRENT_FUNCTION: NO ASSISTANCE NEEDED

## 2023-02-07 ASSESSMENT — SOCIAL DETERMINANTS OF HEALTH (SDOH)
HOW OFTEN DO YOU ATTEND CHURCH OR RELIGIOUS SERVICES?: NEVER
IN A TYPICAL WEEK, HOW MANY TIMES DO YOU TALK ON THE PHONE WITH FAMILY, FRIENDS, OR NEIGHBORS?: MORE THAN THREE TIMES A WEEK
DO YOU BELONG TO ANY CLUBS OR ORGANIZATIONS SUCH AS CHURCH GROUPS UNIONS, FRATERNAL OR ATHLETIC GROUPS, OR SCHOOL GROUPS?: YES
HOW OFTEN DO YOU GET TOGETHER WITH FRIENDS OR RELATIVES?: TWICE A WEEK

## 2023-02-07 ASSESSMENT — LIFESTYLE VARIABLES
HOW OFTEN DO YOU HAVE A DRINK CONTAINING ALCOHOL: NEVER
HOW MANY STANDARD DRINKS CONTAINING ALCOHOL DO YOU HAVE ON A TYPICAL DAY: PATIENT DOES NOT DRINK
HOW OFTEN DO YOU HAVE SIX OR MORE DRINKS ON ONE OCCASION: NEVER
SKIP TO QUESTIONS 9-10: 1
AUDIT-C TOTAL SCORE: 0

## 2023-02-09 ENCOUNTER — OFFICE VISIT (OUTPATIENT)
Dept: FAMILY MEDICINE | Facility: CLINIC | Age: 78
End: 2023-02-09
Payer: MEDICARE

## 2023-02-09 VITALS
BODY MASS INDEX: 31.39 KG/M2 | OXYGEN SATURATION: 97 % | HEART RATE: 62 BPM | WEIGHT: 200 LBS | RESPIRATION RATE: 16 BRPM | HEIGHT: 67 IN | SYSTOLIC BLOOD PRESSURE: 119 MMHG | DIASTOLIC BLOOD PRESSURE: 78 MMHG

## 2023-02-09 DIAGNOSIS — Z13.220 SCREENING FOR HYPERLIPIDEMIA: ICD-10-CM

## 2023-02-09 DIAGNOSIS — E03.8 OTHER SPECIFIED HYPOTHYROIDISM: ICD-10-CM

## 2023-02-09 DIAGNOSIS — Z00.00 ENCOUNTER FOR MEDICARE ANNUAL WELLNESS EXAM: Primary | ICD-10-CM

## 2023-02-09 DIAGNOSIS — Z13.6 CARDIOVASCULAR SCREENING; LDL GOAL LESS THAN 100: ICD-10-CM

## 2023-02-09 DIAGNOSIS — I63.9 CEREBROVASCULAR ACCIDENT (CVA), UNSPECIFIED MECHANISM (H): ICD-10-CM

## 2023-02-09 DIAGNOSIS — N18.30 STAGE 3 CHRONIC KIDNEY DISEASE, UNSPECIFIED WHETHER STAGE 3A OR 3B CKD (H): ICD-10-CM

## 2023-02-09 DIAGNOSIS — Z23 NEED FOR TDAP VACCINATION: ICD-10-CM

## 2023-02-09 DIAGNOSIS — F51.01 PRIMARY INSOMNIA: ICD-10-CM

## 2023-02-09 LAB
ANION GAP SERPL CALCULATED.3IONS-SCNC: 12 MMOL/L (ref 7–15)
BUN SERPL-MCNC: 30.7 MG/DL (ref 8–23)
CALCIUM SERPL-MCNC: 9.8 MG/DL (ref 8.8–10.2)
CHLORIDE SERPL-SCNC: 110 MMOL/L (ref 98–107)
CHOLEST SERPL-MCNC: 168 MG/DL
CREAT SERPL-MCNC: 1.37 MG/DL (ref 0.51–0.95)
CREAT UR-MCNC: 64.5 MG/DL
DEPRECATED HCO3 PLAS-SCNC: 19 MMOL/L (ref 22–29)
ERYTHROCYTE [DISTWIDTH] IN BLOOD BY AUTOMATED COUNT: 15.5 % (ref 10–15)
GFR SERPL CREATININE-BSD FRML MDRD: 40 ML/MIN/1.73M2
GLUCOSE SERPL-MCNC: 92 MG/DL (ref 70–99)
HCT VFR BLD AUTO: 40 % (ref 35–47)
HDLC SERPL-MCNC: 85 MG/DL
HGB BLD-MCNC: 12.9 G/DL (ref 11.7–15.7)
LDLC SERPL CALC-MCNC: 67 MG/DL
MCH RBC QN AUTO: 28.9 PG (ref 26.5–33)
MCHC RBC AUTO-ENTMCNC: 32.3 G/DL (ref 31.5–36.5)
MCV RBC AUTO: 90 FL (ref 78–100)
MICROALBUMIN UR-MCNC: <12 MG/L
MICROALBUMIN/CREAT UR: NORMAL MG/G{CREAT}
NONHDLC SERPL-MCNC: 83 MG/DL
PLATELET # BLD AUTO: 205 10E3/UL (ref 150–450)
POTASSIUM SERPL-SCNC: 4.8 MMOL/L (ref 3.4–5.3)
RBC # BLD AUTO: 4.47 10E6/UL (ref 3.8–5.2)
SODIUM SERPL-SCNC: 141 MMOL/L (ref 136–145)
TRIGL SERPL-MCNC: 82 MG/DL
TSH SERPL DL<=0.005 MIU/L-ACNC: 2.72 UIU/ML (ref 0.3–4.2)
WBC # BLD AUTO: 4.2 10E3/UL (ref 4–11)

## 2023-02-09 PROCEDURE — 84443 ASSAY THYROID STIM HORMONE: CPT | Performed by: PHYSICIAN ASSISTANT

## 2023-02-09 PROCEDURE — 90471 IMMUNIZATION ADMIN: CPT | Performed by: PHYSICIAN ASSISTANT

## 2023-02-09 PROCEDURE — 80048 BASIC METABOLIC PNL TOTAL CA: CPT | Performed by: PHYSICIAN ASSISTANT

## 2023-02-09 PROCEDURE — 36415 COLL VENOUS BLD VENIPUNCTURE: CPT | Performed by: PHYSICIAN ASSISTANT

## 2023-02-09 PROCEDURE — G0439 PPPS, SUBSEQ VISIT: HCPCS | Performed by: PHYSICIAN ASSISTANT

## 2023-02-09 PROCEDURE — 80061 LIPID PANEL: CPT | Performed by: PHYSICIAN ASSISTANT

## 2023-02-09 PROCEDURE — 82043 UR ALBUMIN QUANTITATIVE: CPT | Performed by: PHYSICIAN ASSISTANT

## 2023-02-09 PROCEDURE — 82570 ASSAY OF URINE CREATININE: CPT | Performed by: PHYSICIAN ASSISTANT

## 2023-02-09 PROCEDURE — 85027 COMPLETE CBC AUTOMATED: CPT | Performed by: PHYSICIAN ASSISTANT

## 2023-02-09 PROCEDURE — 99214 OFFICE O/P EST MOD 30 MIN: CPT | Mod: 25 | Performed by: PHYSICIAN ASSISTANT

## 2023-02-09 PROCEDURE — 90715 TDAP VACCINE 7 YRS/> IM: CPT | Performed by: PHYSICIAN ASSISTANT

## 2023-02-09 RX ORDER — ATORVASTATIN CALCIUM 40 MG/1
TABLET, FILM COATED ORAL
Qty: 90 TABLET | Refills: 3 | Status: SHIPPED | OUTPATIENT
Start: 2023-02-09 | End: 2023-12-19

## 2023-02-09 RX ORDER — LEVOTHYROXINE SODIUM 50 UG/1
50 TABLET ORAL DAILY
Qty: 90 TABLET | Refills: 3 | Status: SHIPPED | OUTPATIENT
Start: 2023-02-09 | End: 2024-04-05

## 2023-02-09 RX ORDER — CLOPIDOGREL BISULFATE 75 MG/1
75 TABLET ORAL DAILY
Qty: 90 TABLET | Refills: 1 | Status: SHIPPED | OUTPATIENT
Start: 2023-02-09 | End: 2023-09-19

## 2023-02-09 RX ORDER — QUETIAPINE FUMARATE 50 MG/1
50-100 TABLET, FILM COATED ORAL AT BEDTIME
Qty: 180 TABLET | Refills: 3 | Status: SHIPPED | OUTPATIENT
Start: 2023-02-09 | End: 2024-02-20

## 2023-02-09 ASSESSMENT — ENCOUNTER SYMPTOMS
PARESTHESIAS: 0
NERVOUS/ANXIOUS: 0
FREQUENCY: 1
WEAKNESS: 0
HEARTBURN: 0
EYE PAIN: 0
ABDOMINAL PAIN: 0
JOINT SWELLING: 0
SHORTNESS OF BREATH: 0
NAUSEA: 0
CONSTIPATION: 0
SORE THROAT: 0
ARTHRALGIAS: 0
HEMATOCHEZIA: 0
FEVER: 0
MYALGIAS: 0
COUGH: 0
HEADACHES: 0
DIZZINESS: 0
BREAST MASS: 0
DYSURIA: 0
PALPITATIONS: 0
HEMATURIA: 0
DIARRHEA: 0
CHILLS: 0

## 2023-02-09 ASSESSMENT — ACTIVITIES OF DAILY LIVING (ADL): CURRENT_FUNCTION: NO ASSISTANCE NEEDED

## 2023-02-09 NOTE — PROGRESS NOTES
"SUBJECTIVE:   Tonja is a 77 year old who presents for Preventive Visit.  Patient has been advised of split billing requirements and indicates understanding: Yes  Are you in the first 12 months of your Medicare coverage?  No    Healthy Habits:     In general, how would you rate your overall health?  Good    Frequency of exercise:  None    Do you usually eat at least 4 servings of fruit and vegetables a day, include whole grains    & fiber and avoid regularly eating high fat or \"junk\" foods?  Yes    Taking medications regularly:  Yes    Ability to successfully perform activities of daily living:  No assistance needed    Home Safety:  No safety concerns identified    Hearing Impairment:  No hearing concerns    In the past 6 months, have you been bothered by leaking of urine? Yes    In general, how would you rate your overall mental or emotional health?  Excellent      PHQ-2 Total Score: 0    Additional concerns today:  No      Have you ever done Advance Care Planning? (For example, a Health Directive, POLST, or a discussion with a medical provider or your loved ones about your wishes): Yes, advance care planning is on file.       Fall risk  Fallen 2 or more times in the past year?: No  Any fall with injury in the past year?: No    Cognitive Screening   1) Repeat 3 items (Leader, Season, Table)    2) Clock draw: NORMAL  3) 3 item recall: Recalls 3 objects  Results: 3 items recalled: COGNITIVE IMPAIRMENT LESS LIKELY    Mini-CogTM Copyright DINA Salter. Licensed by the author for use in [facility]; reprinted with permission (kera@.[facility]). All rights reserved.      Do you have sleep apnea, excessive snoring or daytime drowsiness?: no    Reviewed and updated as needed this visit by clinical staff    Allergies  Meds              Reviewed and updated as needed this visit by Provider                 Social History     Tobacco Use     Smoking status: Never     Smokeless tobacco: Never   Substance Use Topics     " Alcohol use: Never     If you drink alcohol do you typically have >3 drinks per day or >7 drinks per week? No    Alcohol Use 2/7/2023   Prescreen: >3 drinks/day or >7 drinks/week? No   Prescreen: >3 drinks/day or >7 drinks/week? -               Current providers sharing in care for this patient include:   Patient Care Team:  Aaseby-Aguilera, Ramona Ann, PA-C as PCP - General (Family Practice)  Aaseby-Aguilera, Ramona Ann, PA-C as Assigned PCP  Jacqui Huynh RN as Personal Advocate & Liaison (PAL) (Family Medicine)    The following health maintenance items are reviewed in Epic and correct as of today:  Health Maintenance   Topic Date Due     ANNUAL REVIEW OF HM ORDERS  01/14/2023     BMP  02/01/2023     LIPID  02/01/2023     MICROALBUMIN  02/01/2023     MEDICARE ANNUAL WELLNESS VISIT  01/14/2023     HEMOGLOBIN  02/01/2023     DTAP/TDAP/TD IMMUNIZATION (3 - Td or Tdap) 04/08/2023     MAMMO SCREENING  02/07/2024     FALL RISK ASSESSMENT  02/09/2024     COLORECTAL CANCER SCREENING  06/28/2024     ADVANCE CARE PLANNING  01/14/2027     DEXA  09/17/2033     HEPATITIS C SCREENING  Completed     PHQ-2 (once per calendar year)  Completed     INFLUENZA VACCINE  Completed     Pneumococcal Vaccine: 65+ Years  Completed     URINALYSIS  Completed     ZOSTER IMMUNIZATION  Completed     COVID-19 Vaccine  Completed     IPV IMMUNIZATION  Aged Out     MENINGITIS IMMUNIZATION  Aged Out     BP Readings from Last 3 Encounters:   02/09/23 119/78   01/06/23 118/68   07/29/22 108/60    Wt Readings from Last 3 Encounters:   02/09/23 90.7 kg (200 lb)   01/06/23 91.3 kg (201 lb 4.8 oz)   07/29/22 88.5 kg (195 lb)                  Recent Labs   Lab Test 02/01/22  0757 07/29/21  1127 06/18/21  1606 05/18/21  1250 04/15/21  1130 04/09/21  0758 04/08/21  1415 10/22/20  0944   A1C  --   --   --   --   --  5.6  --   --    LDL 60  --   --   --   --  110*  --  119*     --   --   --   --  86  --  89   TRIG 70  --   --   --   --  81  --  101  "  ALT  --   --   --  102*  --   --  34 27   CR 1.33* 1.32* 1.39* 1.36*   < > 1.20* 1.49* 1.34*   GFRESTIMATED 42* 40* 37* 38*   < > 44* 34* 39*   GFRESTBLACK  --   --  43* 44*   < > 52* 40* 45*   POTASSIUM 4.7 5.2 4.1 4.7   < > 4.8 4.4 4.4   TSH  --  2.22  --   --   --   --   --  2.04    < > = values in this interval not displayed.          Mammogram Screening - Patient over age 75, has elected to continue with screening.  Pertinent mammograms are reviewed under the imaging tab.    Review of Systems   Constitutional: Negative for chills and fever.   HENT: Negative for congestion, ear pain, hearing loss and sore throat.    Eyes: Negative for pain and visual disturbance.   Respiratory: Negative for cough and shortness of breath.    Cardiovascular: Negative for chest pain, palpitations and peripheral edema.   Gastrointestinal: Negative for abdominal pain, constipation, diarrhea, heartburn, hematochezia and nausea.   Breasts:  Negative for tenderness, breast mass and discharge.   Genitourinary: Positive for frequency and urgency. Negative for dysuria, genital sores, hematuria, pelvic pain, vaginal bleeding and vaginal discharge.   Musculoskeletal: Negative for arthralgias, joint swelling and myalgias.   Skin: Negative for rash.   Neurological: Negative for dizziness, weakness, headaches and paresthesias.   Psychiatric/Behavioral: Negative for mood changes. The patient is not nervous/anxious.          OBJECTIVE:   There were no vitals taken for this visit. Estimated body mass index is 33.24 kg/m  as calculated from the following:    Height as of 1/14/22: 1.657 m (5' 5.25\").    Weight as of 1/6/23: 91.3 kg (201 lb 4.8 oz).  Physical Exam  GENERAL APPEARANCE: healthy, alert and no distress  EYES: Eyes grossly normal to inspection, PERRL and conjunctivae and sclerae normal  HENT: ear canals and TM's normal, nose and mouth without ulcers or lesions, oropharynx clear and oral mucous membranes moist  NECK: no adenopathy, no " asymmetry, masses, or scars and thyroid normal to palpation  RESP: lungs clear to auscultation - no rales, rhonchi or wheezes  BREAST: normal without masses, tenderness or nipple discharge and no palpable axillary masses or adenopathy  CV: regular rate and rhythm, normal S1 S2, no S3 or S4, no murmur, click or rub, no peripheral edema and peripheral pulses strong  ABDOMEN: soft, nontender, no hepatosplenomegaly, no masses and bowel sounds normal  MS: no musculoskeletal defects are noted and gait is age appropriate without ataxia  SKIN: no suspicious lesions or rashes  NEURO: Normal strength and tone, sensory exam grossly normal, mentation intact and speech normal  PSYCH: mentation appears normal and affect normal/bright    Diagnostic Test Results:  Labs reviewed in Epic    ASSESSMENT / PLAN:   (Z00.00) Encounter for Medicare annual wellness exam  (primary encounter diagnosis)  Comment:   Plan:     (I63.9) Cerebrovascular accident (CVA), unspecified mechanism (H)  Comment: stable symptosm   Plan: clopidogrel (PLAVIX) 75 MG tablet            (N18.30) Stage 3 chronic kidney disease, unspecified whether stage 3a or 3b CKD (H)  Comment:   Plan: BASIC METABOLIC PANEL, Albumin Random Urine         Quantitative with Creat Ratio, CBC with         platelets            *    (E03.8) Other specified hypothyroidism  Comment: stable symptosm   Plan: TSH with free T4 reflex, levothyroxine         (SYNTHROID/LEVOTHROID) 50 MCG tablet            (F51.01) Primary insomnia  Comment: works well   Plan: QUEtiapine (SEROQUEL) 50 MG tablet            (Z13.6) CARDIOVASCULAR SCREENING; LDL GOAL LESS THAN 100  Comment:   Plan: Lipid panel reflex to direct LDL Non-fasting,         atorvastatin (LIPITOR) 40 MG tablet                    COUNSELING:  Reviewed preventive health counseling, as reflected in patient instructions       Regular exercise       Healthy diet/nutrition       Vision screening       Hearing screening        She reports that  she has never smoked. She has never used smokeless tobacco.      Appropriate preventive services were discussed with this patient, including applicable screening as appropriate for cardiovascular disease, diabetes, osteopenia/osteoporosis, and glaucoma.  As appropriate for age/gender, discussed screening for colorectal cancer, prostate cancer, breast cancer, and cervical cancer. Checklist reviewing preventive services available has been given to the patient.    Reviewed patients plan of care and provided an AVS. The Basic Care Plan (routine screening as documented in Health Maintenance) for Tonja meets the Care Plan requirement. This Care Plan has been established and reviewed with the Patient.      Ramona Ann Aaseby-Aguilera, PA-C  [facility]    Identified Health Risks:

## 2023-02-09 NOTE — PATIENT INSTRUCTIONS
Patient Education   Personalized Prevention Plan  You are due for the preventive services outlined below.  Your care team is available to assist you in scheduling these services.  If you have already completed any of these items, please share that information with your care team to update in your medical record.  Health Maintenance Due   Topic Date Due     ANNUAL REVIEW OF HM ORDERS  01/14/2023     Basic Metabolic Panel  02/01/2023     Cholesterol Lab  02/01/2023     Kidney Microalbumin Urine Test  02/01/2023     Annual Wellness Visit  01/14/2023     Hemoglobin  02/01/2023

## 2023-03-23 ENCOUNTER — E-VISIT (OUTPATIENT)
Dept: FAMILY MEDICINE | Facility: CLINIC | Age: 78
End: 2023-03-23
Payer: MEDICARE

## 2023-03-23 DIAGNOSIS — J02.0 STREPTOCOCCAL SORE THROAT: Primary | ICD-10-CM

## 2023-03-23 PROCEDURE — 99421 OL DIG E/M SVC 5-10 MIN: CPT | Performed by: PHYSICIAN ASSISTANT

## 2023-03-24 NOTE — PATIENT INSTRUCTIONS
Thank you for choosing us for your care. Based on your symptoms and length of illness, I do not think that you need a prescription at this time.  Please follow the care advise I've provided and use the over the counter medications to help relieve your symptoms.   View your full visit summary for details by clicking on the link below.     If you're not feeling better within 2-3 days, please respond to this message and we can consider if a prescription is needed.  You can schedule an appointment right here in [facility], or call [phone]  If the visit is for the same symptoms as your eVisit, we'll refund the cost of your eVisit if seen within seven days.      Thank you for choosing us for your care. Based on your symptoms and length of illness, I do not think that you need an antibiotic prescription at this time.  Please follow the care advise I ve provided and use the prescribed medication to help relieve your symptoms. View your full visit summary for details by clicking on the link below.     If you re not feeling better within 5-7 days, please respond to this message and we can consider if an antibiotic prescription is needed.  You can schedule an appointment right here in [facility], or call [phone]  If the visit is for the same symptoms as your eVisit, we ll refund the cost of your eVisit if seen within seven days

## 2023-08-01 ENCOUNTER — MYC MEDICAL ADVICE (OUTPATIENT)
Dept: FAMILY MEDICINE | Facility: CLINIC | Age: 78
End: 2023-08-01
Payer: MEDICARE

## 2023-08-01 DIAGNOSIS — N18.31 STAGE 3A CHRONIC KIDNEY DISEASE (H): ICD-10-CM

## 2023-08-01 RX ORDER — LISINOPRIL 2.5 MG/1
2.5 TABLET ORAL DAILY
Qty: 90 TABLET | Refills: 1 | Status: SHIPPED | OUTPATIENT
Start: 2023-08-01 | End: 2023-12-19

## 2023-09-14 ENCOUNTER — OFFICE VISIT (OUTPATIENT)
Dept: URGENT CARE | Facility: URGENT CARE | Age: 78
End: 2023-09-14
Payer: MEDICARE

## 2023-09-14 VITALS
RESPIRATION RATE: 18 BRPM | BODY MASS INDEX: 29.91 KG/M2 | WEIGHT: 191 LBS | DIASTOLIC BLOOD PRESSURE: 59 MMHG | TEMPERATURE: 98.6 F | SYSTOLIC BLOOD PRESSURE: 90 MMHG | HEART RATE: 79 BPM | OXYGEN SATURATION: 96 %

## 2023-09-14 DIAGNOSIS — R05.1 ACUTE COUGH: ICD-10-CM

## 2023-09-14 DIAGNOSIS — J06.9 VIRAL URI WITH COUGH: Primary | ICD-10-CM

## 2023-09-14 PROCEDURE — 99213 OFFICE O/P EST LOW 20 MIN: CPT | Performed by: FAMILY MEDICINE

## 2023-09-14 RX ORDER — BENZONATATE 200 MG/1
200 CAPSULE ORAL 3 TIMES DAILY PRN
Qty: 21 CAPSULE | Refills: 0 | Status: SHIPPED | OUTPATIENT
Start: 2023-09-14 | End: 2024-05-01

## 2023-09-14 NOTE — PATIENT INSTRUCTIONS
Adult Self-Care for Colds  Colds are caused by viruses. They can t be cured with antibiotics. However, you can relieve symptoms and support your body s efforts to heal itself. No matter which symptoms you have, be sure to drink plenty of fluids (water or clear soup); stop smoking and drinking alcohol; and get plenty of rest.      Understand a fever  Relax, lie down. Go to bed if you want. Just get off your feet and rest. Also, drink plenty of fluids to avoid dehydration.  Take acetaminophen or a nonsteroidal anti-inflammatory agent (NSAID), such as ibuprofen.  A fever is a normal reaction of your body to an illness. The temperature itself often isn t harmful. It actually helps your body fight infections. You don t need to treat a fever unless you feel very uncomfortable.   If the fever doesn t get better within 1 hour after you take acetaminophen, take ibuprofen. If this works, keep taking the ibuprofen every 6 to 8 hours.   If either medicine alone doesn t keep the fever down, you may switch off between the 2 medicines every 3 to 4 hours. For example, take ibuprofen. Wait 3 hours. Then take acetaminophen. Wait 3 hours. Take ibuprofen, and so on.  Treat a troubled nose kindly  Breathe steam or heated humidified air to open blocked nasal passages.  a hot shower or use a vaporizer. Be careful not to get burned by the steam.  Saline nasal sprays and decongestant tablets help open a stuffy nose. Antihistamines (Claritin, Zyrtec, etc.) can also help, but they can cause side effects such as drowsiness and drying of the eyes, nose, and mouth.  Nasal rinses such as Netti pot will help with the sinus congestion and nasal drainage.   Soothe a sore throat and cough  Gargle every 2 hours with 1/4 teaspoon of salt dissolved in 1/2 cup of warm water. Suck on throat lozenges and cough drops to moisten your throat.  Gargling with Chloraseptic spray   Cough medicines are available but it is unclear how effective they  actually are.  Danette Montez tbs for cough suppressant   Take acetaminophen or an NSAID, such as ibuprofen to ease throat pain  Humidifier in room where you are sleeping.   Ease digestive problems  Put fluid back into your body. Take frequent sips of clear liquids such as water or broth. Do not drink beverages with a lot of sugar in them, such as juices and sodas. These can make diarrhea worse. Older children and adults can drink sports drinks.  Patient will need to drink at least 1.5-2 liters of fluids daily for adults and 1-1.5 liters for children. If vomiting and not tolerating liquids for more than 24 hrs, please go to your nearest emergency department for IV fluids and further treatment.   Maintain hydration by drinking small amounts of clear fluids frequently, then soft diet, and then advance diet as tolerated. May use any prescribed imodium if desired for any diarrhea. Call if symptoms worsen, high fever, severe weakness or fainting, increased abdominal pain, blood in stool or vomit, or failure to improve in 2-3 days.   As your appetite returns, you can resume your normal diet. Ask your doctor whether there are any foods you should avoid.  When to seek medical care  When you first notice symptoms, ask your health care provider if antiviral medications are appropriate. Antibiotics should not be taken for colds or flu. Also, call your doctor if you have any of the following symptoms or if you aren t feeling better after 7 days:  Shortness of breath  Pain or pressure in the chest or abdomen  Worsening symptoms, especially after a period of improvement  Fever that doesn t go down with medication  Sudden dizziness or confusion  Severe or continued vomiting  Signs of dehydration, including extreme thirst, dark urine, infrequent urination, dry mouth  Spotted, red, or very sore throat

## 2023-09-14 NOTE — PROGRESS NOTES
URGENT CARE VISIT:    ASSESSMENT AND PLAN:      ICD-10-CM    1. Viral URI with cough  J06.9       2. Acute cough  R05.1 benzonatate (TESSALON) 200 MG capsule          Differentials include but not limited to COVID-19, Bronchitis-viral, Pneumonia, and Viral upper respiratory illness.  Suspect viral URI and will treat with Tessalon Perles 3 times as needed for cough.  Supportive measures outlined in AVS.      Red flag symptoms for urgent evaluation via ED shared.      Follow up with primary care provider with any problems, questions or concerns or if symptoms worsen or fail to improve. Patient verbalized understanding and is agreeable to plan. The patient was discharged ambulatory and in stable condition.    SUBJECTIVE:   Tonja Nieves is a 78 year old female presenting for chief complaint of cough - productive.  Patient was experiencing sore throat and headache earlier with symptoms but these have shown improvement over the past few days.  Onset was 1 week(s) ago.   She denies the following symptoms: fever, chills, runny nose, stuffy nose, wheezing, shortness of breath, body aches, fatigue, vomiting, and diarrhea  Course of illness is improving.    Treatment measures tried include Tylenol/Ibuprofen and Fluids.  Predisposing factors include ill contact: Family member .      COVID Home testing:  COVID test x3 (negative)    PMH:   Past Medical History:   Diagnosis Date    Cerebral infarction (H) April 2021    TIA    Chronic kidney disease, stage 3, mod decreased GFR (H)     Hypothyroid     PONV (postoperative nausea and vomiting)     TIA (transient ischemic attack) 04/08/2021    Urge incontinence      Allergies: Patient has no known allergies.   Medications:   Current Outpatient Medications   Medication Sig Dispense Refill    atorvastatin (LIPITOR) 40 MG tablet TAKE 1 TABLET BY MOUTH OR FEEDING TUBE ROUTE EVERY EVENING 90 tablet 3    benzonatate (TESSALON) 200 MG capsule Take 1 capsule (200 mg) by mouth 3 times daily  as needed for cough 21 capsule 0    clopidogrel (PLAVIX) 75 MG tablet 1 tablet (75 mg) by Oral or Feeding Tube route daily 90 tablet 1    levothyroxine (SYNTHROID/LEVOTHROID) 50 MCG tablet Take 1 tablet (50 mcg) by mouth daily Take one by mouth daily 90 tablet 3    lisinopril (ZESTRIL) 2.5 MG tablet Take 1 tablet (2.5 mg) by mouth daily 90 tablet 1    oxybutynin (OXYTROL) 3.9 MG/24HR BIW patch Place 1 patch onto the skin twice a week 8 patch 11    QUEtiapine (SEROQUEL) 50 MG tablet Take 1-2 tablets ( mg) by mouth At Bedtime 180 tablet 3     Social History:   Social History     Tobacco Use    Smoking status: Never    Smokeless tobacco: Never   Substance Use Topics    Alcohol use: Never       ROS:  Review of systems negative except as stated above.    OBJECTIVE:  BP 90/59   Pulse 79   Temp 98.6  F (37  C) (Oral)   Resp 18   Wt 86.6 kg (191 lb)   SpO2 96%   BMI 29.91 kg/m      GENERAL APPEARANCE: healthy, alert and no distress  EYES: EOMI,  PERRL, conjunctiva clear  HENT: ear canals and TM's normal.  Nose and mouth without ulcers, erythema or lesions.  Tonsils are absent due to surgical history.  NECK: supple, nontender, no lymphadenopathy  RESP: lungs clear to auscultation - no rales, rhonchi or wheezes  CV: regular rates and rhythm, normal S1 S2, no murmur noted  SKIN: no suspicious lesions or rashes    Labs:      No results found for any visits on 09/14/23.

## 2023-09-18 ENCOUNTER — MYC MEDICAL ADVICE (OUTPATIENT)
Dept: FAMILY MEDICINE | Facility: CLINIC | Age: 78
End: 2023-09-18
Payer: MEDICARE

## 2023-09-18 DIAGNOSIS — I63.9 CEREBROVASCULAR ACCIDENT (CVA), UNSPECIFIED MECHANISM (H): ICD-10-CM

## 2023-09-19 RX ORDER — CLOPIDOGREL BISULFATE 75 MG/1
75 TABLET ORAL DAILY
Qty: 90 TABLET | Refills: 0 | Status: SHIPPED | OUTPATIENT
Start: 2023-09-19 | End: 2023-12-19

## 2023-09-19 NOTE — TELEPHONE ENCOUNTER
Prescription approved per [facility] Refill Protocol.  Reta Sharpe, RN  [facility] Triage Nurse   PAIN

## 2023-12-17 ENCOUNTER — MYC REFILL (OUTPATIENT)
Dept: FAMILY MEDICINE | Facility: CLINIC | Age: 78
End: 2023-12-17
Payer: MEDICARE

## 2023-12-17 DIAGNOSIS — Z13.6 CARDIOVASCULAR SCREENING; LDL GOAL LESS THAN 100: ICD-10-CM

## 2023-12-17 DIAGNOSIS — N18.31 STAGE 3A CHRONIC KIDNEY DISEASE (H): ICD-10-CM

## 2023-12-17 DIAGNOSIS — I63.9 CEREBROVASCULAR ACCIDENT (CVA), UNSPECIFIED MECHANISM (H): ICD-10-CM

## 2023-12-17 RX ORDER — ATORVASTATIN CALCIUM 40 MG/1
TABLET, FILM COATED ORAL
Qty: 90 TABLET | Refills: 3 | Status: CANCELLED | OUTPATIENT
Start: 2023-12-17

## 2023-12-17 RX ORDER — LISINOPRIL 2.5 MG/1
2.5 TABLET ORAL DAILY
Qty: 90 TABLET | Refills: 1 | Status: CANCELLED | OUTPATIENT
Start: 2023-12-17

## 2023-12-17 RX ORDER — CLOPIDOGREL BISULFATE 75 MG/1
75 TABLET ORAL DAILY
Qty: 90 TABLET | Refills: 0 | Status: CANCELLED | OUTPATIENT
Start: 2023-12-17

## 2023-12-19 RX ORDER — CLOPIDOGREL BISULFATE 75 MG/1
TABLET ORAL
Qty: 90 TABLET | Refills: 0 | Status: SHIPPED | OUTPATIENT
Start: 2023-12-19 | End: 2024-03-25

## 2023-12-19 RX ORDER — LISINOPRIL 2.5 MG/1
2.5 TABLET ORAL DAILY
Qty: 90 TABLET | Refills: 0 | Status: SHIPPED | OUTPATIENT
Start: 2023-12-19 | End: 2024-04-16

## 2023-12-19 RX ORDER — ATORVASTATIN CALCIUM 40 MG/1
TABLET, FILM COATED ORAL
Qty: 90 TABLET | Refills: 0 | Status: SHIPPED | OUTPATIENT
Start: 2023-12-19 | End: 2024-04-16

## 2024-01-10 ENCOUNTER — PATIENT OUTREACH (OUTPATIENT)
Dept: CARE COORDINATION | Facility: CLINIC | Age: 79
End: 2024-01-10
Payer: MEDICARE

## 2024-01-24 ENCOUNTER — PATIENT OUTREACH (OUTPATIENT)
Dept: CARE COORDINATION | Facility: CLINIC | Age: 79
End: 2024-01-24
Payer: MEDICARE

## 2024-02-17 DIAGNOSIS — F51.01 PRIMARY INSOMNIA: ICD-10-CM

## 2024-02-20 RX ORDER — QUETIAPINE FUMARATE 50 MG/1
50-100 TABLET, FILM COATED ORAL AT BEDTIME
Qty: 60 TABLET | Refills: 0 | Status: SHIPPED | OUTPATIENT
Start: 2024-02-20 | End: 2024-04-23

## 2024-03-24 DIAGNOSIS — I63.9 CEREBROVASCULAR ACCIDENT (CVA), UNSPECIFIED MECHANISM (H): ICD-10-CM

## 2024-03-25 RX ORDER — CLOPIDOGREL BISULFATE 75 MG/1
TABLET ORAL
Qty: 90 TABLET | Refills: 0 | Status: SHIPPED | OUTPATIENT
Start: 2024-03-25 | End: 2024-05-01

## 2024-04-05 DIAGNOSIS — E03.8 OTHER SPECIFIED HYPOTHYROIDISM: ICD-10-CM

## 2024-04-05 RX ORDER — LEVOTHYROXINE SODIUM 50 UG/1
50 TABLET ORAL DAILY
Qty: 90 TABLET | Refills: 0 | Status: SHIPPED | OUTPATIENT
Start: 2024-04-05 | End: 2024-05-01

## 2024-04-14 DIAGNOSIS — Z13.6 CARDIOVASCULAR SCREENING; LDL GOAL LESS THAN 100: ICD-10-CM

## 2024-04-14 DIAGNOSIS — N18.31 STAGE 3A CHRONIC KIDNEY DISEASE (H): ICD-10-CM

## 2024-04-16 RX ORDER — ATORVASTATIN CALCIUM 40 MG/1
TABLET, FILM COATED ORAL
Qty: 90 TABLET | Refills: 0 | Status: SHIPPED | OUTPATIENT
Start: 2024-04-16 | End: 2024-05-01

## 2024-04-16 RX ORDER — LISINOPRIL 2.5 MG/1
2.5 TABLET ORAL DAILY
Qty: 90 TABLET | Refills: 0 | Status: SHIPPED | OUTPATIENT
Start: 2024-04-16 | End: 2024-05-01

## 2024-04-21 DIAGNOSIS — F51.01 PRIMARY INSOMNIA: ICD-10-CM

## 2024-04-23 RX ORDER — QUETIAPINE FUMARATE 50 MG/1
50-100 TABLET, FILM COATED ORAL AT BEDTIME
Qty: 60 TABLET | Refills: 0 | Status: SHIPPED | OUTPATIENT
Start: 2024-04-23 | End: 2024-05-01

## 2024-04-24 SDOH — HEALTH STABILITY: PHYSICAL HEALTH: ON AVERAGE, HOW MANY MINUTES DO YOU ENGAGE IN EXERCISE AT THIS LEVEL?: 0 MIN

## 2024-04-24 SDOH — HEALTH STABILITY: PHYSICAL HEALTH: ON AVERAGE, HOW MANY DAYS PER WEEK DO YOU ENGAGE IN MODERATE TO STRENUOUS EXERCISE (LIKE A BRISK WALK)?: 0 DAYS

## 2024-04-24 SDOH — HEALTH STABILITY: PHYSICAL HEALTH: ON AVERAGE, HOW MANY DAYS PER WEEK DO YOU ENGAGE IN MODERATE TO STRENUOUS EXERCISE (LIKE A BRISK WALK)?: 1 DAY

## 2024-04-24 SDOH — HEALTH STABILITY: PHYSICAL HEALTH: ON AVERAGE, HOW MANY MINUTES DO YOU ENGAGE IN EXERCISE AT THIS LEVEL?: 10 MIN

## 2024-04-24 ASSESSMENT — SOCIAL DETERMINANTS OF HEALTH (SDOH)
HOW OFTEN DO YOU GET TOGETHER WITH FRIENDS OR RELATIVES?: MORE THAN THREE TIMES A WEEK
DO YOU BELONG TO ANY CLUBS OR ORGANIZATIONS SUCH AS CHURCH GROUPS UNIONS, FRATERNAL OR ATHLETIC GROUPS, OR SCHOOL GROUPS?: NO
HOW OFTEN DO YOU GET TOGETHER WITH FRIENDS OR RELATIVES?: MORE THAN THREE TIMES A WEEK
HOW OFTEN DO YOU ATTENT MEETINGS OF THE CLUB OR ORGANIZATION YOU BELONG TO?: 1 TO 4 TIMES PER YEAR
IN A TYPICAL WEEK, HOW MANY TIMES DO YOU TALK ON THE PHONE WITH FAMILY, FRIENDS, OR NEIGHBORS?: MORE THAN THREE TIMES A WEEK
HOW OFTEN DO YOU ATTEND CHURCH OR RELIGIOUS SERVICES?: NEVER

## 2024-04-24 ASSESSMENT — LIFESTYLE VARIABLES
HOW MANY STANDARD DRINKS CONTAINING ALCOHOL DO YOU HAVE ON A TYPICAL DAY: PATIENT DOES NOT DRINK
AUDIT-C TOTAL SCORE: 0
SKIP TO QUESTIONS 9-10: 1
HOW OFTEN DO YOU HAVE A DRINK CONTAINING ALCOHOL: NEVER
HOW OFTEN DO YOU HAVE SIX OR MORE DRINKS ON ONE OCCASION: NEVER

## 2024-04-24 NOTE — COMMUNITY RESOURCES LIST (ENGLISH)
April 24, 2024           YOUR PERSONALIZED LIST OF SERVICES & PROGRAMS           & RECREATION    Sports      [employer] - Summer Sports Camp  [address] [place] [address] (Distance: 4.5 miles)  Phone: [phone]  Website: [email or web address]_[email or web address]_[email or web address]  Language: English  Fee: Self pay      [facility] - [employer]- Women  [id number] [place] [address] (Distance: 3.3 miles)  Website: http://[employer]/  Language: English, Zambian  Fee: Free      [facility] - Adult Enrichment  Phone: [phone]  Website: [email or web address]  Language: English  Hours: Mon 7:30 AM - 4:00 PM Tue 7:30 AM - 4:00 PM Wed 7:30 AM - 4:00 PM Thu 7:30 AM - 4:00 PM Fri 7:30 AM - 4:00 PM    Classes/Groups      [employer] - Group Exercise Classes  [address] [place] [address] (Distance: 4.5 miles)  Phone: [phone]  Website: [email or web address]__[email or web address]_group_exercise_classes  Language: English  Fee: Self pay      [employer] - Virtual Y  [address] [place] [address] (Distance: 4.5 miles)  Phone: [phone]  Website: [email or web address]://www.[employer]bee/oveuliq-mnjg-hxayz  Language: English      [facility] - Adult Enrichment  Phone: [phone]  Website: [email or web address]  Language: English  Hours: Mon 7:30 AM - 4:00 PM Tue 7:30 AM - 4:00 PM Wed 7:30 AM - 4:00 PM Thu 7:30 AM - 4:00 PM Fri 7:30 AM - 4:00 PM               IMPORTANT NUMBERS & WEBSITES        Emergency Services  911  .   [facility]  [address] [email or web address] [place] [address].org  .   Poison Control  (630) [phone] http://[place]poison.org http://[place]poison.org  .     Suicide and Crisis Lifeline  988 http://988lifeline.org  .   [employer] [place] Child Abuse Hotline  902.766.9963 http://Childhelphotline.org   .   National  Sexual Assault Hotline  (197) 820-8597 (HOPE) http://[place].Clariture   .     National Runaway Safeline  (202) 827-9634 ([place]AY) http://Tongxue.Clariture  .   Pregnancy & Postpartum Support  Call/text 248-644-5487  [place]: http://ppsupport[place].org  [place]: http://psichapters.com/[place]  .   Substance Abuse National Helpline ([facility])  194-820-HELP (3553) http://Findtreatment.gov   .                DISCLAIMER: These resources have been generated via the [employer] Platform. [employer] does not endorse any service providers mentioned in this resource list. [employer] does not guarantee that the services mentioned in this resource list will be available to you or will improve your health or wellness.    [facility]

## 2024-04-27 ENCOUNTER — HEALTH MAINTENANCE LETTER (OUTPATIENT)
Age: 79
End: 2024-04-27

## 2024-05-01 ENCOUNTER — TELEPHONE (OUTPATIENT)
Dept: FAMILY MEDICINE | Facility: CLINIC | Age: 79
End: 2024-05-01

## 2024-05-01 ENCOUNTER — OFFICE VISIT (OUTPATIENT)
Dept: FAMILY MEDICINE | Facility: CLINIC | Age: 79
End: 2024-05-01
Payer: MEDICARE

## 2024-05-01 VITALS
HEART RATE: 56 BPM | HEIGHT: 66 IN | SYSTOLIC BLOOD PRESSURE: 115 MMHG | DIASTOLIC BLOOD PRESSURE: 74 MMHG | TEMPERATURE: 97.7 F | OXYGEN SATURATION: 98 % | WEIGHT: 201 LBS | BODY MASS INDEX: 32.3 KG/M2 | RESPIRATION RATE: 14 BRPM

## 2024-05-01 DIAGNOSIS — Z13.0 SCREENING FOR BLOOD DISEASE: ICD-10-CM

## 2024-05-01 DIAGNOSIS — F51.01 PRIMARY INSOMNIA: ICD-10-CM

## 2024-05-01 DIAGNOSIS — Z13.6 CARDIOVASCULAR SCREENING; LDL GOAL LESS THAN 100: ICD-10-CM

## 2024-05-01 DIAGNOSIS — I63.9 CEREBROVASCULAR ACCIDENT (CVA), UNSPECIFIED MECHANISM (H): ICD-10-CM

## 2024-05-01 DIAGNOSIS — Z00.00 ENCOUNTER FOR MEDICARE ANNUAL WELLNESS EXAM: Primary | ICD-10-CM

## 2024-05-01 DIAGNOSIS — N18.30 STAGE 3 CHRONIC KIDNEY DISEASE, UNSPECIFIED WHETHER STAGE 3A OR 3B CKD (H): ICD-10-CM

## 2024-05-01 DIAGNOSIS — E03.8 OTHER SPECIFIED HYPOTHYROIDISM: ICD-10-CM

## 2024-05-01 DIAGNOSIS — N39.41 URGE INCONTINENCE OF URINE: ICD-10-CM

## 2024-05-01 DIAGNOSIS — Z12.31 VISIT FOR SCREENING MAMMOGRAM: ICD-10-CM

## 2024-05-01 LAB
ERYTHROCYTE [DISTWIDTH] IN BLOOD BY AUTOMATED COUNT: 14.3 % (ref 10–15)
HCT VFR BLD AUTO: 40.1 % (ref 35–47)
HGB BLD-MCNC: 12.6 G/DL (ref 11.7–15.7)
MCH RBC QN AUTO: 29.4 PG (ref 26.5–33)
MCHC RBC AUTO-ENTMCNC: 31.4 G/DL (ref 31.5–36.5)
MCV RBC AUTO: 94 FL (ref 78–100)
PLATELET # BLD AUTO: 188 10E3/UL (ref 150–450)
RBC # BLD AUTO: 4.28 10E6/UL (ref 3.8–5.2)
WBC # BLD AUTO: 4.2 10E3/UL (ref 4–11)

## 2024-05-01 PROCEDURE — 84443 ASSAY THYROID STIM HORMONE: CPT | Performed by: PHYSICIAN ASSISTANT

## 2024-05-01 PROCEDURE — 85027 COMPLETE CBC AUTOMATED: CPT | Performed by: PHYSICIAN ASSISTANT

## 2024-05-01 PROCEDURE — 99214 OFFICE O/P EST MOD 30 MIN: CPT | Mod: 25 | Performed by: PHYSICIAN ASSISTANT

## 2024-05-01 PROCEDURE — 36415 COLL VENOUS BLD VENIPUNCTURE: CPT | Performed by: PHYSICIAN ASSISTANT

## 2024-05-01 PROCEDURE — G0439 PPPS, SUBSEQ VISIT: HCPCS | Performed by: PHYSICIAN ASSISTANT

## 2024-05-01 PROCEDURE — 80053 COMPREHEN METABOLIC PANEL: CPT | Performed by: PHYSICIAN ASSISTANT

## 2024-05-01 PROCEDURE — 82570 ASSAY OF URINE CREATININE: CPT | Performed by: PHYSICIAN ASSISTANT

## 2024-05-01 PROCEDURE — 80061 LIPID PANEL: CPT | Performed by: PHYSICIAN ASSISTANT

## 2024-05-01 PROCEDURE — 82043 UR ALBUMIN QUANTITATIVE: CPT | Performed by: PHYSICIAN ASSISTANT

## 2024-05-01 RX ORDER — LEVOTHYROXINE SODIUM 50 UG/1
50 TABLET ORAL DAILY
Qty: 90 TABLET | Refills: 3 | Status: SHIPPED | OUTPATIENT
Start: 2024-05-01

## 2024-05-01 RX ORDER — LISINOPRIL 2.5 MG/1
2.5 TABLET ORAL DAILY
Qty: 90 TABLET | Refills: 3 | Status: SHIPPED | OUTPATIENT
Start: 2024-05-01

## 2024-05-01 RX ORDER — ATORVASTATIN CALCIUM 40 MG/1
TABLET, FILM COATED ORAL
Qty: 90 TABLET | Refills: 4 | Status: SHIPPED | OUTPATIENT
Start: 2024-05-01

## 2024-05-01 RX ORDER — QUETIAPINE FUMARATE 50 MG/1
50 TABLET, FILM COATED ORAL AT BEDTIME
Qty: 90 TABLET | Refills: 3 | Status: SHIPPED | OUTPATIENT
Start: 2024-05-01

## 2024-05-01 RX ORDER — RESPIRATORY SYNCYTIAL VIRUS VACCINE 120MCG/0.5
0.5 KIT INTRAMUSCULAR ONCE
Qty: 1 EACH | Refills: 0 | Status: SHIPPED | OUTPATIENT
Start: 2024-05-01 | End: 2024-05-01

## 2024-05-01 RX ORDER — CLOPIDOGREL BISULFATE 75 MG/1
75 TABLET ORAL DAILY
Qty: 90 TABLET | Refills: 3 | Status: SHIPPED | OUTPATIENT
Start: 2024-05-01

## 2024-05-01 NOTE — PROGRESS NOTES
"Preventive Care Visit  [facility]  Ramona Ann Aaseby-Aguilera, PA-C, Family Medicine  May 1, 2024      Assessment & Plan     Encounter for Medicare annual wellness exam  Age and gender appropriate preventive care and screenings are discussed.  Particular attention to personal preventive care and age appropriate lifestyle including the incorporation of healthy diet and physical activity is made       Stage 3 chronic kidney disease, unspecified whether stage 3a or 3b CKD (H)  Stable   - lisinopril (ZESTRIL) 2.5 MG tablet; Take 1 tablet (2.5 mg) by mouth daily  - Comprehensive metabolic panel  - Albumin Random Urine Quantitative with Creat Ratio    Other specified hypothyroidism  Stable   - levothyroxine (SYNTHROID/LEVOTHROID) 50 MCG tablet; Take 1 tablet (50 mcg) by mouth daily    CARDIOVASCULAR SCREENING; LDL GOAL LESS THAN 100    - atorvastatin (LIPITOR) 40 MG tablet; TAKE 1 TABLET BY MOUTH OR FEEDING TUBE ONCE DAILY IN THE EVENING  - Lipid Profile  - Comprehensive metabolic panel    Cerebrovascular accident (CVA), unspecified mechanism (H)    - clopidogrel (PLAVIX) 75 MG tablet; Take 1 tablet (75 mg) by mouth daily    Primary insomnia    - QUEtiapine (SEROQUEL) 50 MG tablet; Take 1 tablet (50 mg) by mouth at bedtime    Urge incontinence of urine    - oxyBUTYnin (OXYTROL) 3.9 MG/24HR BIW patch; Place 1 patch onto the skin twice a week        Screening for blood disease    - CBC with platelets    Visit for screening mammogram    - *MA Screening Digital Bilateral; Future          BMI  Estimated body mass index is 32.94 kg/m  as calculated from the following:    Height as of this encounter: 1.664 m (5' 5.5\").    Weight as of this encounter: 91.2 kg (201 lb).   Weight management plan: Discussed healthy diet and exercise guidelines    Counseling  Appropriate preventive services were discussed with this patient, including applicable screening as appropriate for fall prevention, nutrition, physical " activity, Tobacco-use cessation, weight loss and cognition.  Checklist reviewing preventive services available has been given to the patient.  Reviewed patient's diet, addressing concerns and/or questions.   Information on urinary incontinence and treatment options given to patient.           Wendy Evangelista is a 79 year old, presenting for the following:  Wellness Visit        5/1/2024    10:45 AM   Additional Questions   Roomed by Nate Landrum   Accompanied by self         Health Care Directive  Patient has a Health Care Directive on file  Advance care planning document is on file and is current.    HPI          4/24/2024   General Health   How would you rate your overall physical health? Good   Feel stress (tense, anxious, or unable to sleep) Not at all    Only a little         4/24/2024   Nutrition   Diet: Regular (no restrictions)         4/24/2024   Exercise   Days per week of moderate/strenous exercise 0 days    1 day   Average minutes spent exercising at this level 0 min    10 min   (!) EXERCISE CONCERN      4/24/2024   Social Factors   Frequency of gathering with friends or relatives More than three times a week    More than three times a week   Worry food won't last until get money to buy more No    No   Food not last or not have enough money for food? No    No   Do you have housing?  Yes    Yes   Are you worried about losing your housing? No    No   Lack of transportation? No    No   Unable to get utilities (heat,electricity)? No    No         4/27/2024   Fall Risk   Fallen 2 or more times in the past year? No   Trouble with walking or balance? No          4/24/2024   Activities of Daily Living- Home Safety   Needs help with the following daily activites None of the above   Safety concerns in the home None of the above         4/24/2024   Dental   Dentist two times every year? Yes         4/24/2024   Hearing Screening   Hearing concerns? None of the above         4/24/2024   Driving Risk Screening    Patient/family members have concerns about driving No         2024   General Alertness/Fatigue Screening   Have you been more tired than usual lately? No         2024   Urinary Incontinence Screening   Bothered by leaking urine in past 6 months Yes         2024   TB Screening   Were you born outside of the [place]? No         Today's PHQ-2 Score:       2024     1:29 PM   PHQ-2 (  [employer])   Q1: Little interest or pleasure in doing things 0   Q2: Feeling down, depressed or hopeless 0   PHQ-2 Score 0   Q1: Little interest or pleasure in doing things Not at all   Q2: Feeling down, depressed or hopeless Not at all   PHQ-2 Score 0           2024   Substance Use   Alcohol more than 3/day or more than 7/wk Not Applicable   Do you have a current opioid prescription? No   How severe/bad is pain from 1 to 10? 0/10 (No Pain)   Do you use any other substances recreationally? No     Social History     Tobacco Use    Smoking status: Never    Smokeless tobacco: Never   Vaping Use    Vaping status: Never Used   Substance Use Topics    Alcohol use: Never    Drug use: Never           2022   LAST FHS-7 RESULTS   1st degree relative breast or ovarian cancer No   Any relative bilateral breast cancer No   Any male have breast cancer No   Any ONE woman have BOTH breast AND ovarian cancer No   Any woman with breast cancer before 50yrs No   2 or more relatives with breast AND/OR ovarian cancer No   2 or more relatives with breast AND/OR bowel cancer No        Mammogram Screening - Mammogram every 1-2 years updated in Health Maintenance based on mutual decision making    ASCVD Risk   The ASCVD Risk score (Eleanor CASAS, et al., 2019) failed to calculate for the following reasons:    The patient has a prior MI or stroke diagnosis    Fracture Risk Assessment Tool  Link to Frax Calculator  Use the information below to complete the Frax calculator  : 1945  Sex: female  Weight (kg): 91.2 kg (actual  weight)  Height (cm): 166.4 cm  Previous Fragility Fracture:  No  History of parent with fractured hip:  No  Current Smoking:  No  Patient has been on glucocorticoids for more than 3 months (5mg/day or more): No  Rheumatoid Arthritis on Problem List:  No  Secondary Osteoporosis on Problem List:  No  Consumes 3 or more units of alcohol per day: No  Femoral Neck BMD (g/cm2)            Reviewed and updated as needed this visit by Provider                      Current providers sharing in care for this patient include:  Patient Care Team:  Aaseby-Aguilera, Ramona Ann, PA-C as PCP - General (Family Practice)  Aaseby-Aguilera, Ramona Ann, PA-C as Assigned PCP    The following health maintenance items are reviewed in Epic and correct as of today:  Health Maintenance   Topic Date Due    RSV VACCINE (Pregnancy & 60+) (1 - 1-dose 60+ series) Never done    BMP  02/09/2024    LIPID  02/09/2024    MICROALBUMIN  02/09/2024    TSH W/FREE T4 REFLEX  02/09/2024    ANNUAL REVIEW OF HM ORDERS  02/09/2024    HEMOGLOBIN  02/09/2024    COLORECTAL CANCER SCREENING  06/28/2024    MEDICARE ANNUAL WELLNESS VISIT  05/01/2025    FALL RISK ASSESSMENT  05/01/2025    GLUCOSE  02/09/2026    ADVANCE CARE PLANNING  02/09/2028    DTAP/TDAP/TD IMMUNIZATION (4 - Td or Tdap) 02/09/2033    DEXA  09/17/2033    HEPATITIS C SCREENING  Completed    PHQ-2 (once per calendar year)  Completed    INFLUENZA VACCINE  Completed    Pneumococcal Vaccine: 65+ Years  Completed    URINALYSIS  Completed    ZOSTER IMMUNIZATION  Completed    COVID-19 Vaccine  Completed    IPV IMMUNIZATION  Aged Out    HPV IMMUNIZATION  Aged Out    MENINGITIS IMMUNIZATION  Aged Out    RSV MONOCLONAL ANTIBODY  Aged Out    MAMMO SCREENING  Discontinued         Review of Systems  CONSTITUTIONAL: NEGATIVE for fever, chills, change in weight  INTEGUMENTARY/SKIN: NEGATIVE for worrisome rashes, moles or lesions  EYES: NEGATIVE for vision changes or irritation  ENT/MOUTH: NEGATIVE for ear, mouth  "and throat problems  RESP: NEGATIVE for significant cough or SOB  BREAST: NEGATIVE for masses, tenderness or discharge  CV: NEGATIVE for chest pain, palpitations or peripheral edema  GI: NEGATIVE for nausea, abdominal pain, heartburn, or change in bowel habits  : NEGATIVE for frequency, dysuria, or hematuria  MUSCULOSKELETAL: NEGATIVE for significant arthralgias or myalgia  NEURO: NEGATIVE for weakness, dizziness or paresthesias  ENDOCRINE: NEGATIVE for temperature intolerance, skin/hair changes  HEME: NEGATIVE for bleeding problems  PSYCHIATRIC: NEGATIVE for changes in mood or affect     Objective    Exam  There were no vitals taken for this visit.   Estimated body mass index is 29.91 kg/m  as calculated from the following:    Height as of 2/9/23: 1.702 m (5' 7\").    Weight as of 9/14/23: 86.6 kg (191 lb).    Physical Exam  GENERAL: alert and no distress  EYES: Eyes grossly normal to inspection, PERRL and conjunctivae and sclerae normal  HENT: ear canals and TM's normal, nose and mouth without ulcers or lesions  NECK: no adenopathy, no asymmetry, masses, or scars  RESP: lungs clear to auscultation - no rales, rhonchi or wheezes  BREAST: normal without masses, tenderness or nipple discharge and no palpable axillary masses or adenopathy  CV: regular rate and rhythm, normal S1 S2, no S3 or S4, no murmur, click or rub, no peripheral edema  ABDOMEN: soft, nontender, no hepatosplenomegaly, no masses and bowel sounds normal  MS: no gross musculoskeletal defects noted, no edema  SKIN: no suspicious lesions or rashes  NEURO: Normal strength and tone, mentation intact and speech normal  PSYCH: mentation appears normal, affect normal/bright  LYMPH: no cervical, supraclavicular, axillary, or inguinal adenopathy         No data to display                       Signed Electronically by: Ramona Ann Aaseby-Aguilera, PA-C    "

## 2024-05-01 NOTE — TELEPHONE ENCOUNTER
Prior Authorization Retail Medication Request    Medication/Dose: oxyBUTYnin (OXYTROL) 3.9 MG/24HR BIW patch  Diagnosis and ICD code (if different than what is on RX):    New/renewal/insurance change PA/secondary ins. PA:  Previously Tried and Failed:    Rationale:      Insurance   Primary: [employer]  Insurance ID:  [id number]    Secondary (if applicable):  Insurance ID:      Pharmacy Information (if different than what is on RX)  Name:    Phone:    Fax:      Krystian SMITH

## 2024-05-01 NOTE — PATIENT INSTRUCTIONS
Preventive Care Advice   This is general advice given by our system to help you stay healthy. However, your care team may have specific advice just for you. Please talk to your care team about your preventive care needs.  Nutrition  Eat 5 or more servings of fruits and vegetables each day.  Try wheat bread, brown rice and whole grain pasta (instead of white bread, rice, and pasta).  Get enough calcium and vitamin D. Check the label on foods and aim for 100% of the RDA (recommended daily allowance).  Lifestyle  Exercise at least 150 minutes each week   (30 minutes a day, 5 days a week).  Do muscle strengthening activities 2 days a week. These help control your weight and prevent disease.  No smoking.  Wear sunscreen to prevent skin cancer.  Have a dental exam and cleaning every 6 months.  Yearly exams  See your health care team every year to talk about:  Any changes in your health.  Any medicines your care team has prescribed.  Preventive care, family planning, and ways to prevent chronic diseases.  Shots (vaccines)   HPV shots (up to age 26), if you've never had them before.  Hepatitis B shots (up to age 59), if you've never had them before.  COVID-19 shot: Get this shot when it's due.  Flu shot: Get a flu shot every year.  Tetanus shot: Get a tetanus shot every 10 years.  Pneumococcal, hepatitis A, and RSV shots: Ask your care team if you need these based on your risk.  Shingles shot (for age 50 and up).  General health tests  Diabetes screening:  Starting at age 35, Get screened for diabetes at least every 3 years.  If you are younger than age 35, ask your care team if you should be screened for diabetes.  Cholesterol test: At age 39, start having a cholesterol test every 5 years, or more often if advised.  Bone density scan (DEXA): At age 50, ask your care team if you should have this scan for osteoporosis (brittle bones).  Hepatitis C: Get tested at least once in your life.  STIs (sexually transmitted  infections)  Before age 24: Ask your care team if you should be screened for STIs.  After age 24: Get screened for STIs if you're at risk. You are at risk for STIs (including HIV) if:  You are sexually active with more than one person.  You don't use condoms every time.  You or a partner was diagnosed with a sexually transmitted infection.  If you are at risk for HIV, ask about PrEP medicine to prevent HIV.  Get tested for HIV at least once in your life, whether you are at risk for HIV or not.  Cancer screening tests  Cervical cancer screening: If you have a cervix, begin getting regular cervical cancer screening tests at age 21. Most people who have regular screenings with normal results can stop after age 65. Talk about this with your provider.  Breast cancer scan (mammogram): If you've ever had breasts, begin having regular mammograms starting at age 40. This is a scan to check for breast cancer.  Colon cancer screening: It is important to start screening for colon cancer at age 45.  Have a colonoscopy test every 10 years (or more often if you're at risk) Or, ask your provider about stool tests like a FIT test every year or Cologuard test every 3 years.  To learn more about your testing options, visit: https://www.[employer]/412415.pdf.  For help making a decision, visit: https://bit.ly/uu12416.  Prostate cancer screening test: If you have a prostate and are age 55 to 69, ask your provider if you would benefit from a yearly prostate cancer screening test.  Lung cancer screening: If you are a current or former smoker age 50 to 80, ask your care team if ongoing lung cancer screenings are right for you.  For informational purposes only. Not to replace the advice of your health care provider. Copyright   2023 [employer]. All rights reserved. Clinically reviewed by the [facility] Transitions Program. [employer] 035651 - REV 01/24.    Bladder Training: Care Instructions  Your Care Instructions      Bladder training is used to treat urge incontinence and stress incontinence. Urge incontinence means that the need to urinate comes on so fast that you can't get to a toilet in time. Stress incontinence means that you leak urine because of pressure on your bladder. For example, it may happen when you laugh, cough, or lift something heavy.  Bladder training can increase how long you can wait before you have to urinate. It can also help your bladder hold more urine. And it can give you better control over the urge to urinate.  It is important to remember that bladder training takes a few weeks to a few months to make a difference. You may not see results right away, but don't give up.  Follow-up care is a key part of your treatment and safety. Be sure to make and go to all appointments, and call your doctor if you are having problems. It's also a good idea to know your test results and keep a list of the medicines you take.  How can you care for yourself at home?  Work with your doctor to come up with a bladder training program that is right for you. You may use one or more of the following methods.  Delayed urination  In the beginning, try to keep from urinating for 5 minutes after you first feel the need to go.  While you wait, take deep, slow breaths to relax. Kegel exercises can also help you delay the need to go to the bathroom.  After some practice, when you can easily wait 5 minutes to urinate, try to wait 10 minutes before you urinate.  Slowly increase the waiting period until you are able to control when you have to urinate.  Scheduled urination  Empty your bladder when you first wake up in the morning.  Schedule times throughout the day when you will urinate.  Start by going to the bathroom every hour, even if you don't need to go.  Slowly increase the time between trips to the bathroom.  When you have found a schedule that works well for you, keep doing it.  If you wake up during the night and have to  "urinate, do it. Apply your schedule to waking hours only.  Kegel exercises  These tighten and strengthen pelvic muscles, which can help you control the flow of urine. (If doing these exercises causes pain, stop doing them and talk with your doctor.) To do Kegel exercises:  Squeeze your muscles as if you were trying not to pass gas. Or squeeze your muscles as if you were stopping the flow of urine. Your belly, legs, and buttocks shouldn't move.  Hold the squeeze for 3 seconds, then relax for 5 to 10 seconds.  Start with 3 seconds, then add 1 second each week until you are able to squeeze for 10 seconds.  Repeat the exercise 10 times a session. Do 3 to 8 sessions a day.  When should you call for help?  Watch closely for changes in your health, and be sure to contact your doctor if:    Your incontinence is getting worse.     You do not get better as expected.   Where can you learn more?  Go to https://www.[employer].net/patiented  Enter V684 in the search box to learn more about \"Bladder Training: Care Instructions.\"  Current as of: November 15, 2023               Content Version: 14.0    4903-9960 [employer].   Care instructions adapted under license by your healthcare professional. If you have questions about a medical condition or this instruction, always ask your healthcare professional. [employer] disclaims any warranty or liability for your use of this information.      "

## 2024-05-02 LAB
ALBUMIN SERPL BCG-MCNC: 4 G/DL (ref 3.5–5.2)
ALP SERPL-CCNC: 105 U/L (ref 40–150)
ALT SERPL W P-5'-P-CCNC: 77 U/L (ref 0–50)
ANION GAP SERPL CALCULATED.3IONS-SCNC: 12 MMOL/L (ref 7–15)
AST SERPL W P-5'-P-CCNC: 41 U/L (ref 0–45)
BILIRUB SERPL-MCNC: 0.6 MG/DL
BUN SERPL-MCNC: 26.7 MG/DL (ref 8–23)
CALCIUM SERPL-MCNC: 9.1 MG/DL (ref 8.8–10.2)
CHLORIDE SERPL-SCNC: 111 MMOL/L (ref 98–107)
CHOLEST SERPL-MCNC: 171 MG/DL
CREAT SERPL-MCNC: 1.24 MG/DL (ref 0.51–0.95)
CREAT UR-MCNC: 83.9 MG/DL
DEPRECATED HCO3 PLAS-SCNC: 18 MMOL/L (ref 22–29)
EGFRCR SERPLBLD CKD-EPI 2021: 45 ML/MIN/1.73M2
FASTING STATUS PATIENT QL REPORTED: YES
GLUCOSE SERPL-MCNC: 91 MG/DL (ref 70–99)
HDLC SERPL-MCNC: 91 MG/DL
LDLC SERPL CALC-MCNC: 66 MG/DL
MICROALBUMIN UR-MCNC: <12 MG/L
MICROALBUMIN/CREAT UR: NORMAL MG/G{CREAT}
NONHDLC SERPL-MCNC: 80 MG/DL
POTASSIUM SERPL-SCNC: 4.3 MMOL/L (ref 3.4–5.3)
PROT SERPL-MCNC: 6.6 G/DL (ref 6.4–8.3)
SODIUM SERPL-SCNC: 141 MMOL/L (ref 135–145)
TRIGL SERPL-MCNC: 68 MG/DL
TSH SERPL DL<=0.005 MIU/L-ACNC: 3.04 UIU/ML (ref 0.3–4.2)

## 2024-05-15 NOTE — TELEPHONE ENCOUNTER
Pharmacy calling to check status of PA. Notified that PA team is a few weeks backed up. Will get to them in order it was received. Has already been sent to PA team.  Tresa Viramontes,

## 2024-05-16 NOTE — TELEPHONE ENCOUNTER
PA Initiation    Medication: OXYTROL 3.9 MG/24HR TD PTTW  Insurance Company: [employer] Part D - Phone [phone] Fax [phone]  Pharmacy Filling the Rx: [facility] [address] [place] [address] [id number]  Filling Pharmacy Phone: [phone]  Filling Pharmacy Fax:    Start Date: 5/16/2024

## 2024-05-17 NOTE — TELEPHONE ENCOUNTER
PRIOR AUTHORIZATION DENIED    Medication: OXYTROL 3.9 MG/24HR TD PTTW  Insurance Company: [employer] Part D - Phone [phone] Fax [phone]  Denial Date: 5/17/2024  Denial Reason(s): Needs to try/fail formulary alternatives      Appeal Information:   Patient Notified: No

## 2025-03-08 ENCOUNTER — HEALTH MAINTENANCE LETTER (OUTPATIENT)
Age: 80
End: 2025-03-08

## 2025-04-01 DIAGNOSIS — I63.9 CEREBROVASCULAR ACCIDENT (CVA), UNSPECIFIED MECHANISM (H): ICD-10-CM

## 2025-04-01 DIAGNOSIS — F51.01 PRIMARY INSOMNIA: ICD-10-CM

## 2025-04-01 RX ORDER — CLOPIDOGREL BISULFATE 75 MG/1
75 TABLET ORAL DAILY
Qty: 90 TABLET | Refills: 0 | Status: SHIPPED | OUTPATIENT
Start: 2025-04-01

## 2025-04-02 RX ORDER — QUETIAPINE FUMARATE 50 MG/1
50 TABLET, FILM COATED ORAL AT BEDTIME
Qty: 90 TABLET | Refills: 0 | Status: SHIPPED | OUTPATIENT
Start: 2025-04-02

## 2025-04-07 ENCOUNTER — DOCUMENTATION ONLY (OUTPATIENT)
Dept: OTHER | Facility: CLINIC | Age: 80
End: 2025-04-07
Payer: MEDICARE

## 2025-04-14 ENCOUNTER — OFFICE VISIT (OUTPATIENT)
Dept: URGENT CARE | Facility: URGENT CARE | Age: 80
End: 2025-04-14
Payer: MEDICARE

## 2025-04-14 VITALS
HEIGHT: 66 IN | SYSTOLIC BLOOD PRESSURE: 102 MMHG | OXYGEN SATURATION: 100 % | WEIGHT: 201 LBS | BODY MASS INDEX: 32.3 KG/M2 | DIASTOLIC BLOOD PRESSURE: 56 MMHG | RESPIRATION RATE: 16 BRPM | HEART RATE: 75 BPM | TEMPERATURE: 97.6 F

## 2025-04-14 DIAGNOSIS — H61.23 BILATERAL IMPACTED CERUMEN: ICD-10-CM

## 2025-04-14 DIAGNOSIS — J06.9 VIRAL UPPER RESPIRATORY TRACT INFECTION WITH COUGH: Primary | ICD-10-CM

## 2025-04-14 PROCEDURE — 69210 REMOVE IMPACTED EAR WAX UNI: CPT | Mod: LT | Performed by: PHYSICIAN ASSISTANT

## 2025-04-14 PROCEDURE — 3074F SYST BP LT 130 MM HG: CPT | Performed by: PHYSICIAN ASSISTANT

## 2025-04-14 PROCEDURE — 3078F DIAST BP <80 MM HG: CPT | Performed by: PHYSICIAN ASSISTANT

## 2025-04-14 PROCEDURE — 99213 OFFICE O/P EST LOW 20 MIN: CPT | Mod: 25 | Performed by: PHYSICIAN ASSISTANT

## 2025-04-14 NOTE — PROGRESS NOTES
Urgent Care Clinic Visit    Chief Complaint   Patient presents with    Urgent Care     X 4 days, sinus pressure, achy, coughing, recently came back from [place]. Tried Nyquil, that kind of dried her up too much.                4/14/2025    10:15 AM   Additional Questions   Roomed by Liz GUTIÉRREZ

## 2025-04-14 NOTE — PROGRESS NOTES
"URGENT CARE VISIT:    SUBJECTIVE:   Tonja Nieves is a 80 year old female presenting with a chief complaint of stuffy nose, cough - productive, sore throat, and facial pain/pressure.  Onset was 4 day(s) ago.   She denies the following symptoms: shortness of breath  Course of illness is same.    Treatment measures tried include Nyquil with good relief of symptoms.  Predisposing factors include None.    PMH:   Past Medical History:   Diagnosis Date    Cerebral infarction (H) April 2021    TIA    Chronic kidney disease, stage 3, mod decreased GFR (H)     Hypothyroid     PONV (postoperative nausea and vomiting)     TIA (transient ischemic attack) 04/08/2021    Urge incontinence      Allergies: Patient has no known allergies.   Medications:   Current Outpatient Medications   Medication Sig Dispense Refill    atorvastatin (LIPITOR) 40 MG tablet TAKE 1 TABLET BY MOUTH OR FEEDING TUBE ONCE DAILY IN THE EVENING 90 tablet 4    clopidogrel (PLAVIX) 75 MG tablet Take 1 tablet by mouth once daily 90 tablet 0    levothyroxine (SYNTHROID/LEVOTHROID) 50 MCG tablet Take 1 tablet (50 mcg) by mouth daily 90 tablet 3    lisinopril (ZESTRIL) 2.5 MG tablet Take 1 tablet (2.5 mg) by mouth daily 90 tablet 3    oxyBUTYnin (OXYTROL) 3.9 MG/24HR BIW patch Place 1 patch onto the skin twice a week 8 patch 11    QUEtiapine (SEROQUEL) 50 MG tablet TAKE 1 TABLET BY MOUTH AT BEDTIME 90 tablet 0     Social History:   Social History     Tobacco Use    Smoking status: Never    Smokeless tobacco: Never   Substance Use Topics    Alcohol use: Never       ROS:  Review of systems negative except as stated above.    OBJECTIVE:  /56   Pulse 75   Temp 97.6  F (36.4  C)   Resp 16   Ht 1.676 m (5' 6\")   Wt 91.2 kg (201 lb)   SpO2 100%   BMI 32.44 kg/m    GENERAL APPEARANCE: healthy, alert and no distress  EYES: EOMI,  PERRL, conjunctiva clear  HENT: ear canals with cerumen bilaterally. After left ear cerumen was removed the TM was normal.  Nose " and mouth without ulcers, erythema or lesions  NECK: supple, nontender, no lymphadenopathy  RESP: lungs clear to auscultation - no rales, rhonchi or wheezes  CV: regular rates and rhythm, normal S1 S2, no murmur noted  SKIN: no suspicious lesions or rashes    Procedure:  Ear irrigation attempted on bilateral sides with no success. Ear curette was used to remove left ear cerumen. Patient tolerated the procedure well.       ASSESSMENT:    ICD-10-CM    1. Viral upper respiratory tract infection with cough  J06.9       2. Bilateral impacted cerumen  H61.23 MS REMOVAL IMPACTED CERUMEN IRRIGATION/LVG UNILAT          PLAN:  Patient Instructions   Viral URI:  Patient was educated on the natural course of viral illness which typically lasts up to 10 days.  Conservative measures include increased fluids, nasal saline irrigation (neti pot), warm steamy shower, salt water gargles, Afrin nasal spray, expectorants (Mucinex), and analgesics (Tylenol). See your primary care provider if symptoms worsen or do not improve in 7 days. If symptoms do not improve in 7 days then I will send an antibiotic. Seek emergency care if you develop fever over 104 or shortness of breath.      Cerumen impaction:  Patient was educated on the natural  course of the condition. Left ear cerumen removed with curette. Right ear cerumen was unable to removed with irrigation or curette. Conservative measures to prevent ear wax build up including applying a few drops of mineral oil or earwax softener (Debrox) were discussed then seeing PCP to get it removed.  Avoid using q tips as this may push ear wax further in to the ear canal.  See your primary care provider if symptoms worsen or do not improve in 7 days. Seek emergency care if you develop severe ear pain or fever over 103.     Patient verbalized understanding and is agreeable to plan. The patient was discharged ambulatory and in stable condition.    Kaylin Kate PA-C ....................  4/14/2025    11:21 AM

## 2025-04-14 NOTE — PATIENT INSTRUCTIONS
Viral URI:  Patient was educated on the natural course of viral illness which typically lasts up to 10 days.  Conservative measures include increased fluids, nasal saline irrigation (neti pot), warm steamy shower, salt water gargles, Afrin nasal spray, expectorants (Mucinex), and analgesics (Tylenol). See your primary care provider if symptoms worsen or do not improve in 7 days. If symptoms do not improve in 7 days then I will send an antibiotic. Seek emergency care if you develop fever over 104 or shortness of breath.      Cerumen impaction:  Patient was educated on the natural  course of the condition. Left ear cerumen removed with curette. Right ear cerumen was unable to removed with irrigation or curette. Conservative measures to prevent ear wax build up including applying a few drops of mineral oil or earwax softener (Debrox) were discussed then seeing PCP to get it removed.  Avoid using q tips as this may push ear wax further in to the ear canal.  See your primary care provider if symptoms worsen or do not improve in 7 days. Seek emergency care if you develop severe ear pain or fever over 103.

## 2025-05-15 DIAGNOSIS — Z13.6 CARDIOVASCULAR SCREENING; LDL GOAL LESS THAN 100: ICD-10-CM

## 2025-05-15 RX ORDER — ATORVASTATIN CALCIUM 40 MG/1
TABLET, FILM COATED ORAL
Qty: 90 TABLET | Refills: 0 | OUTPATIENT
Start: 2025-05-15

## 2025-05-23 ENCOUNTER — ORDERS ONLY (AUTO-RELEASED) (OUTPATIENT)
Dept: FAMILY MEDICINE | Facility: CLINIC | Age: 80
End: 2025-05-23

## 2025-05-23 DIAGNOSIS — Z12.11 SCREEN FOR COLON CANCER: ICD-10-CM

## 2025-06-04 LAB — NONINV COLON CA DNA+OCC BLD SCRN STL QL: POSITIVE

## 2025-06-23 ENCOUNTER — MYC MEDICAL ADVICE (OUTPATIENT)
Dept: FAMILY MEDICINE | Facility: CLINIC | Age: 80
End: 2025-06-23
Payer: MEDICARE

## 2025-08-20 ENCOUNTER — TELEPHONE (OUTPATIENT)
Dept: GASTROENTEROLOGY | Facility: CLINIC | Age: 80
End: 2025-08-20
Payer: MEDICARE

## 2025-08-20 DIAGNOSIS — R19.5 POSITIVE COLORECTAL CANCER SCREENING USING COLOGUARD TEST: Primary | ICD-10-CM

## 2025-08-20 RX ORDER — BISACODYL 5 MG
TABLET, DELAYED RELEASE (ENTERIC COATED) ORAL
Qty: 4 TABLET | Refills: 0 | Status: SHIPPED | OUTPATIENT
Start: 2025-08-20

## 2025-08-20 RX ORDER — ONDANSETRON 4 MG/1
TABLET, FILM COATED ORAL
Qty: 3 TABLET | Refills: 0 | Status: SHIPPED | OUTPATIENT
Start: 2025-08-20